# Patient Record
Sex: MALE | Race: WHITE | NOT HISPANIC OR LATINO | Employment: OTHER | URBAN - METROPOLITAN AREA
[De-identification: names, ages, dates, MRNs, and addresses within clinical notes are randomized per-mention and may not be internally consistent; named-entity substitution may affect disease eponyms.]

---

## 2017-01-21 ENCOUNTER — HOSPITAL ENCOUNTER (EMERGENCY)
Facility: HOSPITAL | Age: 57
Discharge: HOME | End: 2017-01-21
Attending: EMERGENCY MEDICINE
Payer: MEDICARE

## 2017-01-21 VITALS
SYSTOLIC BLOOD PRESSURE: 122 MMHG | BODY MASS INDEX: 30.06 KG/M2 | DIASTOLIC BLOOD PRESSURE: 80 MMHG | HEIGHT: 70 IN | WEIGHT: 210 LBS | TEMPERATURE: 98 F | HEART RATE: 84 BPM | RESPIRATION RATE: 20 BRPM | OXYGEN SATURATION: 100 %

## 2017-01-21 DIAGNOSIS — G89.29 CHRONIC BACK PAIN, UNSPECIFIED BACK LOCATION, UNSPECIFIED BACK PAIN LATERALITY: ICD-10-CM

## 2017-01-21 DIAGNOSIS — M25.562 CHRONIC PAIN OF LEFT KNEE: ICD-10-CM

## 2017-01-21 DIAGNOSIS — M54.9 CHRONIC BACK PAIN, UNSPECIFIED BACK LOCATION, UNSPECIFIED BACK PAIN LATERALITY: ICD-10-CM

## 2017-01-21 DIAGNOSIS — R14.0 ABDOMINAL DISTENSION: Primary | ICD-10-CM

## 2017-01-21 DIAGNOSIS — G89.29 CHRONIC PAIN OF LEFT KNEE: ICD-10-CM

## 2017-01-21 PROCEDURE — 99283 EMERGENCY DEPT VISIT LOW MDM: CPT

## 2017-01-21 NOTE — ED AVS SNAPSHOT
[facility] [address]  [place] [address]               Lucio Mendoza   2017  3:34 PM   ED    Description:  Male : 1960   Department:  [facility]           Your Care was Coordinated By:     Provider Role From To    Anna Peacock MD Attending Provider 17 1674 --      Reason for Visit     Abnormal Lab           Diagnoses this Visit        Comments    Abdominal distension    -  Primary     Chronic back pain, unspecified back location, unspecified back pain laterality         Chronic pain of left knee           ED Disposition     None           To Do List           Follow-up Information     Follow up with your doctor  In 2 days.    Why:  Your doctor can obtain your abdominal CT scan and ultrsound results from 2016 by requsting medical records.        [facility]     [facility] Nurse Care [facility] -  Assistance  Registered nurses in the [facility] provide clinical advisement, health education, appointment booking, and other advisory services.  Call for this free service at [phone].             Medications           STOP taking these medications     dicyclomine (BENTYL) 20 mg tablet Take 1 tablet (20 mg total) by mouth 2 (two) times daily.    tizanidine (ZANAFLEX) 4 MG tablet Take 1 tablet (4 mg total) by mouth every 6 (six) hours as needed.           Verify that the below list of medications is an accurate representation of the medications you are currently taking.  If none reported, the list may be blank. If incorrect, please contact your healthcare provider. Carry this list with you in case of emergency.           Current Medications     alprazolam (XANAX) 1 MG tablet Take 1 mg by mouth 2 (two) times daily.    duloxetine (CYMBALTA) 20 MG capsule Take 10 mg by mouth once daily.    zolpidem (AMBIEN) 10 mg Tab Take 5 mg by mouth nightly as needed.    triamcinolone acetonide 0.1% (KENALOG) 0.1 % cream APPLY  "TO AFFECTED AREA TWICE DAILY           Clinical Reference Information           Your Vitals Were     BP Pulse Temp Resp Height Weight    122/80 84 98.1 °F (36.7 °C) (Oral) 20 5' 10" (1.778 m) 95.3 kg (210 lb)    SpO2 BMI             100% 30.13 kg/m2         Allergies as of 1/21/2017     No Known Allergies      Immunizations Administered on Date of Encounter - 1/21/2017     None      ED Micro, Lab, POCT     None      ED Imaging Orders     None        Discharge Instructions       Follow up with your doctor and let him know that you had CT and ultrasound of your abdomen done in late December.  He can request your medical records if he wants them.      Your Scheduled Appointments     Jan 25, 2017  8:30 AM CST   CONSULT with MD [place] [facility] - Endocrinology ([place]) [address] [facility] [address]  [place] [address]   [phone]               [facility] complies with applicable Federal civil rights laws and does not discriminate on the basis of race, color, national origin, age, disability, or sex.        Language Assistance Services     ATTENTION: Language assistance services are available, free of charge. Please call [phone].      ATENCIÓN: Si habla español, tiene a pan disposición servicios gratuitos de asistencia lingüística. Llame al [phone].     JUNIOR Ý: N?u b?n nói Ti?ng Vi?t, có các d?ch v? h? tr? ngôn ng? mi?n phí dành cho b?n. G?i s? [phone].        "
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
## 2017-01-21 NOTE — DISCHARGE INSTRUCTIONS
Follow up with your doctor and let him know that you had CT and ultrasound of your abdomen done in late December.  He can request your medical records if he wants them.
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
## 2017-02-13 ENCOUNTER — OFFICE VISIT (OUTPATIENT)
Dept: FAMILY MEDICINE | Facility: CLINIC | Age: 57
End: 2017-02-13
Payer: MEDICARE

## 2017-02-13 ENCOUNTER — LAB VISIT (OUTPATIENT)
Dept: LAB | Facility: HOSPITAL | Age: 57
End: 2017-02-13
Attending: FAMILY MEDICINE
Payer: MEDICARE

## 2017-02-13 VITALS
TEMPERATURE: 98 F | WEIGHT: 207.69 LBS | SYSTOLIC BLOOD PRESSURE: 124 MMHG | DIASTOLIC BLOOD PRESSURE: 87 MMHG | BODY MASS INDEX: 29.08 KG/M2 | HEIGHT: 71 IN | HEART RATE: 91 BPM

## 2017-02-13 DIAGNOSIS — R93.2 ABNORMAL CT OF LIVER: ICD-10-CM

## 2017-02-13 DIAGNOSIS — B18.2 CHRONIC HEPATITIS C WITHOUT HEPATIC COMA: ICD-10-CM

## 2017-02-13 DIAGNOSIS — E55.9 VITAMIN D INSUFFICIENCY: ICD-10-CM

## 2017-02-13 DIAGNOSIS — E83.52 HYPERCALCEMIA: ICD-10-CM

## 2017-02-13 DIAGNOSIS — K76.9 LIVER LESION: Primary | ICD-10-CM

## 2017-02-13 DIAGNOSIS — K80.20 CALCULUS OF GALLBLADDER WITHOUT CHOLECYSTITIS WITHOUT OBSTRUCTION: ICD-10-CM

## 2017-02-13 DIAGNOSIS — E21.0 PRIMARY HYPERPARATHYROIDISM: ICD-10-CM

## 2017-02-13 LAB
AFP SERPL-MCNC: 11 NG/ML
AMMONIA PLAS-SCNC: 39 UMOL/L
APTT BLDCRRT: 32.6 SEC
INR PPP: 1
PROTHROMBIN TIME: 11.1 SEC

## 2017-02-13 PROCEDURE — 82140 ASSAY OF AMMONIA: CPT

## 2017-02-13 PROCEDURE — 85610 PROTHROMBIN TIME: CPT

## 2017-02-13 PROCEDURE — 99499 UNLISTED E&M SERVICE: CPT | Mod: S$GLB,,, | Performed by: FAMILY MEDICINE

## 2017-02-13 PROCEDURE — 99999 PR PBB SHADOW E&M-EST. PATIENT-LVL IV: CPT | Mod: PBBFAC,,, | Performed by: FAMILY MEDICINE

## 2017-02-13 PROCEDURE — 99214 OFFICE O/P EST MOD 30 MIN: CPT | Mod: S$GLB,,, | Performed by: FAMILY MEDICINE

## 2017-02-13 PROCEDURE — 82105 ALPHA-FETOPROTEIN SERUM: CPT

## 2017-02-13 PROCEDURE — 36415 COLL VENOUS BLD VENIPUNCTURE: CPT | Mod: PO

## 2017-02-13 PROCEDURE — 85730 THROMBOPLASTIN TIME PARTIAL: CPT

## 2017-02-13 NOTE — MR AVS SNAPSHOT
[facility]  [address] [address]  Phone: [phone]  Fax: [phone]                  Lucio Mendoza   2017 10:20 AM   Office Visit    Description:  Male : 1960   Provider:  Marvin Chaudhari MD   Department:  [facility]           Reason for Visit     Abdominal Pain           Diagnoses this Visit        Comments    Liver lesion    -  Primary     Primary hyperparathyroidism         Hypercalcemia         Vitamin D insufficiency         Calculus of gallbladder without cholecystitis without obstruction         Chronic hepatitis C without hepatic coma         Abnormal CT of liver                To Do List           Future Appointments        Provider Department Dept Phone    2017 2:00 PM Diamante Masterson MD [facility] Surgery [phone]    2017 1:00 PM JOSE Madrigal [facility] - Endo/Diab/Metab [phone]      Goals (5 Years of Data)     None      [facility]     [facility] On Call Nurse Care Line -  Assistance  Registered nurses in the [facility] Call Center provide clinical advisement, health education, appointment booking, and other advisory services.  Call for this free service at [phone].             Medications                Verify that the below list of medications is an accurate representation of the medications you are currently taking.  If none reported, the list may be blank. If incorrect, please contact your healthcare provider. Carry this list with you in case of emergency.           Current Medications     alprazolam (XANAX) 1 MG tablet Take 1 mg by mouth 2 (two) times daily.    duloxetine (CYMBALTA) 20 MG capsule Take 10 mg by mouth once daily.    triamcinolone acetonide 0.1% (KENALOG) 0.1 % cream APPLY TO AFFECTED AREA TWICE DAILY    zolpidem (AMBIEN) 10 mg Tab Take 5 mg by mouth nightly as needed.           Clinical Reference Information           Your Vitals Were     BP Pulse Temp  "Height Weight BMI    124/87 91 97.7 °F (36.5 °C) 5' 11" (1.803 m) 94.2 kg (207 lb 10.8 oz) 28.96 kg/m2      Blood Pressure          Most Recent Value    BP  124/87      Allergies as of 2/13/2017     No Known Allergies      Immunizations Administered on Date of Encounter - 2/13/2017     None      Orders Placed During Today's Visit      Normal Orders This Visit    Ambulatory referral to Endocrinology     Ambulatory referral to General Surgery     Ambulatory Referral to Hepatology     Future Labs/Procedures Expected by Expires    AFP TUMOR MARKER  2/13/2017 4/14/2018    Ammonia  2/13/2017 4/14/2018    APTT  2/13/2017 4/14/2018    Protime-INR  2/13/2017 4/14/2018      Language Assistance Services     ATTENTION: Language assistance services are available, free of charge. Please call [phone].      ATENCIÓN: Si habla carlos, tiene a pan disposición servicios gratuitos de asistencia lingüística. Llame al [phone].     CHÚ Ý: N?u b?n nói Ti?ng Vi?t, có các d?ch v? h? tr? ngôn ng? mi?n phí dành cho b?n. G?i s? [phone].         [facility] complies with applicable Federal civil rights laws and does not discriminate on the basis of race, color, national origin, age, disability, or sex.        "
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
## 2017-02-14 ENCOUNTER — DOCUMENTATION ONLY (OUTPATIENT)
Dept: TRANSPLANT | Facility: CLINIC | Age: 57
End: 2017-02-14

## 2017-02-14 NOTE — LETTER
February 14, 2017    Gerardo Mendoza  [address] [place] [address]      Dear Gerardo Mendoza:    Your doctor has referred you to the [facility]. You will be contacted by our office and an initial appointment will then be scheduled for you.    We look forward to seeing you soon. If you have any further questions, please contact us at [phone].       Sincerely,        [facility]   [address] [place] [id number] [phone]

## 2017-02-14 NOTE — LETTER
February 14, 2017    Marvin Chaudhari MD  [address] [address] [address] [facility] [address]      Dear Dr. Chaudhari    Patient: Lucio Mendoza   MR Number: [id number]   YOB: 1960     Thank you for the referral of Lucio Mendoza to the [facility]. An initial appointment will be scheduled for your patient with one of our Hepatologists.      Thank you again for your trust in our program.  If there is anything we can do for you or your staff, please feel free to contact us.        Sincerely,        [facility]  [address] [place] [id number]  [phone]

## 2017-02-14 NOTE — NURSING
Pt records reviewed.   Pt will be referred to Hepatology.    Initial referral received  from the [facility].   Referring Provider/diagnosis  Marvin Chaudhari.   Liver lesion  Abnormal CT of liver from Dec 2016     Referral letter sent to provider and patient.

## 2017-02-15 ENCOUNTER — TELEPHONE (OUTPATIENT)
Dept: FAMILY MEDICINE | Facility: CLINIC | Age: 57
End: 2017-02-15

## 2017-02-15 NOTE — TELEPHONE ENCOUNTER
----- Message from Annabel Oconnor sent at 2/15/2017 10:23 AM CST -----  returned call...[phone] or [phone]

## 2017-02-17 ENCOUNTER — TELEPHONE (OUTPATIENT)
Dept: HEPATOLOGY | Facility: CLINIC | Age: 57
End: 2017-02-17

## 2017-02-17 ENCOUNTER — OFFICE VISIT (OUTPATIENT)
Dept: SURGERY | Facility: CLINIC | Age: 57
End: 2017-02-17
Payer: MEDICARE

## 2017-02-17 VITALS
HEIGHT: 71 IN | SYSTOLIC BLOOD PRESSURE: 126 MMHG | DIASTOLIC BLOOD PRESSURE: 84 MMHG | TEMPERATURE: 99 F | WEIGHT: 216.81 LBS | BODY MASS INDEX: 30.35 KG/M2 | HEART RATE: 80 BPM

## 2017-02-17 DIAGNOSIS — B18.2 CHRONIC HEPATITIS C WITHOUT HEPATIC COMA: Primary | ICD-10-CM

## 2017-02-17 DIAGNOSIS — K80.20 CALCULUS OF GALLBLADDER WITHOUT CHOLECYSTITIS WITHOUT OBSTRUCTION: ICD-10-CM

## 2017-02-17 DIAGNOSIS — K76.9 LIVER LESION: ICD-10-CM

## 2017-02-17 DIAGNOSIS — E83.52 HYPERCALCEMIA: ICD-10-CM

## 2017-02-17 PROCEDURE — 99205 OFFICE O/P NEW HI 60 MIN: CPT | Mod: S$GLB,,, | Performed by: SURGERY

## 2017-02-17 PROCEDURE — 99499 UNLISTED E&M SERVICE: CPT | Mod: S$GLB,,, | Performed by: SURGERY

## 2017-02-17 PROCEDURE — 99999 PR PBB SHADOW E&M-EST. PATIENT-LVL III: CPT | Mod: PBBFAC,,, | Performed by: SURGERY
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
## 2017-02-17 NOTE — TELEPHONE ENCOUNTER
----- Message from Jennifer B. Scheuermann, PA sent at 2/17/2017  3:46 PM CST -----  Regarding: thx      ----- Message -----     From: Stalin Amezcua MD     Sent: 2/17/2017   3:40 PM       To: Lillian Gambino RN, #    Can you please arrange a new pt appointment?  Thanks  G  ----- Message -----     From: Diamante Masterson MD     Sent: 2/17/2017   2:43 PM       To: Stalin Amezcua MD    He is awaiting clinic appt with hepatology for liver lesion and hep c abnormality, can you help arrange?    He also has a workup for hyperpth ongoing, looks potentially related to low vit d    He also has a 1.6 cm gallstone and mild ruq tenderness/bloating, may or may not be related. i'll hold on any cholecystectomy until this hepatology eval.     derek hernandez

## 2017-02-17 NOTE — MR AVS SNAPSHOT
"    [facility]  [address] [place] [address]  4th Floor [place] [address]  Phone: [phone]                  Lucio Mendoza   2017 2:00 PM   Office Visit    Description:  Male : 1960   Provider:  Diamante Masterson MD   Department:  [facility]                To Do List           Future Appointments        Provider Department Dept Phone    2017 1:00 PM Celine Sandoval NP [address] - Endo/Diab/Metab [phone]      Goals (5 Years of Data)     None      [facility] On Call     [facility] On Call Nurse Care Line -  Assistance  Registered nurses in the [facility] On Call Center provide clinical advisement, health education, appointment booking, and other advisory services.  Call for this free service at [phone].             Medications                Verify that the below list of medications is an accurate representation of the medications you are currently taking.  If none reported, the list may be blank. If incorrect, please contact your healthcare provider. Carry this list with you in case of emergency.           Current Medications     alprazolam (XANAX) 1 MG tablet Take 1 mg by mouth 2 (two) times daily.    duloxetine (CYMBALTA) 20 MG capsule Take 10 mg by mouth once daily.    triamcinolone acetonide 0.1% (KENALOG) 0.1 % cream APPLY TO AFFECTED AREA TWICE DAILY    zolpidem (AMBIEN) 10 mg Tab Take 5 mg by mouth nightly as needed.           Clinical Reference Information           Your Vitals Were     BP Pulse Temp Height Weight BMI    126/84 (BP Location: Left arm, Patient Position: Sitting) 80 98.5 °F (36.9 °C) (Oral) 5' 11" (1.803 m) 98.4 kg (216 lb 13.2 oz) 30.24 kg/m2      Blood Pressure          Most Recent Value    BP  126/84      Allergies as of 2017     No Known Allergies      Immunizations Administered on Date of Encounter - 2017     None      Language Assistance Services     ATTENTION: Language assistance services are available, free " of charge. Please call [phone].      ATENCIÓN: Si habla español, tiene a pan disposición servicios gratuitos de asistencia lingüística. Llame al [phone].     CHÚ Ý: N?u b?n nói Ti?ng Vi?t, có các d?ch v? h? tr? ngôn ng? mi?n phí dành cho b?n. G?i s? [phone].         [facility] complies with applicable Federal civil rights laws and does not discriminate on the basis of race, color, national origin, age, disability, or sex.

## 2017-02-17 NOTE — LETTER
February 17, 2017      Marvin Chaudhari MD  [address] [facility] [address]           [address]  4th Floor [facility] [address]  Phone: [phone]          Patient: Lucio Mendoza   MR Number: [id number]   YOB: 1960   Date of Visit: 2/17/2017       Dear Dr. Marvin Chaudhari:    Thank you for referring Lucio Mendoza to me for evaluation. Attached you will find relevant portions of my assessment and plan of care.    If you have questions, please do not hesitate to call me. I look forward to following Lucio Mendoza along with you.    Sincerely,    Diamante Masterson MD    Enclosure  CC:  No Recipients    If you would like to receive this communication electronically, please contact [email or web address] or [phone] to request more information on [employer] Link access.    For providers and/or their staff who would like to refer a patient to [facility], please contact us through our one-stop-shop provider referral line, [facility], at [phone].    If you feel you have received this communication in error or would no longer like to receive these types of communications, please e-mail [email or web address]

## 2017-02-20 ENCOUNTER — OFFICE VISIT (OUTPATIENT)
Dept: ENDOCRINOLOGY | Facility: CLINIC | Age: 57
End: 2017-02-20
Payer: MEDICARE

## 2017-02-20 ENCOUNTER — TELEPHONE (OUTPATIENT)
Dept: FAMILY MEDICINE | Facility: CLINIC | Age: 57
End: 2017-02-20

## 2017-02-20 VITALS
WEIGHT: 205 LBS | HEIGHT: 71 IN | HEART RATE: 74 BPM | BODY MASS INDEX: 28.7 KG/M2 | SYSTOLIC BLOOD PRESSURE: 130 MMHG | DIASTOLIC BLOOD PRESSURE: 84 MMHG | RESPIRATION RATE: 16 BRPM

## 2017-02-20 DIAGNOSIS — E55.9 VITAMIN D INSUFFICIENCY: ICD-10-CM

## 2017-02-20 DIAGNOSIS — E21.0 PRIMARY HYPERPARATHYROIDISM: Primary | ICD-10-CM

## 2017-02-20 PROCEDURE — 99999 PR PBB SHADOW E&M-EST. PATIENT-LVL III: CPT | Mod: PBBFAC,,, | Performed by: INTERNAL MEDICINE

## 2017-02-20 PROCEDURE — 99204 OFFICE O/P NEW MOD 45 MIN: CPT | Mod: S$GLB,,, | Performed by: INTERNAL MEDICINE

## 2017-02-20 RX ORDER — ERGOCALCIFEROL 1.25 MG/1
CAPSULE ORAL
Qty: 10 CAPSULE | Refills: 0 | Status: SHIPPED | OUTPATIENT
Start: 2017-02-20 | End: 2017-03-29 | Stop reason: SDUPTHER

## 2017-02-20 NOTE — MR AVS SNAPSHOT
[facility] [address] - Endo/Diab/Metab  [address]  [facility] [address]  Phone: [phone]  Fax: [phone]                  Lucio Mendoza   2017 1:00 PM   Office Visit    Description:  Male : 1960   Provider:  Celine Sandoval NP   Department:  [facility] - Endo/Diab/Metab           Reason for Visit     Hyperparathyroidism           Diagnoses this Visit        Comments    Primary hyperparathyroidism    -  Primary     Vitamin D insufficiency                To Do List           Future Appointments        Provider Department Dept Phone    3/17/2017 10:30 AM NOM US ENDOCRINOLOGY [facility] -[phone]    3/17/2017 11:20 AM NOM, DEXA1 [facility] [phone]    3/17/2017 12:00 PM [facility] NM4 MG2 400LB LIMIT [facility] [phone]    3/17/2017 2:00 PM [facility] NM4 MG2 400LB LIMIT [facility] [phone]    2017 10:00 AM APPOINTMENT LABERIKA MOB [facility] [phone]      Goals (5 Years of Data)     None      Follow-Up and Disposition     Return in about 6 weeks (around 4/3/2017).       These Medications        Disp Refills Start End    ergocalciferol (ERGOCALCIFEROL) 50,000 unit Cap 10 capsule 0 2017     Take 1 capsule by mouth once daily x 5 days, then take 1 capsule by mouth once a week    Pharmacy: [facility] [id number] [address] [facility] [address] AT [facility] Ph #: [phone]         [facility] On Call     [facility] On Call Nurse Care Line -  Assistance  Registered nurses in the [facility] On Call Center provide clinical advisement, health education, appointment booking, and other advisory services.  Call for this free service at [phone].             Medications           START taking these NEW medications        Refills    ergocalciferol (ERGOCALCIFEROL) 50,000 unit Cap 0    Sig: Take 1 capsule by mouth once daily x 5 days, then  "take 1 capsule by mouth once a week    Class: Normal           Verify that the below list of medications is an accurate representation of the medications you are currently taking.  If none reported, the list may be blank. If incorrect, please contact your healthcare provider. Carry this list with you in case of emergency.           Current Medications     alprazolam (XANAX) 1 MG tablet Take 1 mg by mouth 2 (two) times daily.    duloxetine (CYMBALTA) 20 MG capsule Take 10 mg by mouth once daily.    triamcinolone acetonide 0.1% (KENALOG) 0.1 % cream APPLY TO AFFECTED AREA TWICE DAILY    zolpidem (AMBIEN) 10 mg Tab Take 5 mg by mouth nightly as needed.    ergocalciferol (ERGOCALCIFEROL) 50,000 unit Cap Take 1 capsule by mouth once daily x 5 days, then take 1 capsule by mouth once a week           Clinical Reference Information           Your Vitals Were     BP Pulse Resp Height Weight BMI    130/84 (BP Location: Right arm, Patient Position: Sitting, BP Method: Manual) 74 16 5' 11" (1.803 m) 93 kg (205 lb 0.4 oz) 28.6 kg/m2      Blood Pressure          Most Recent Value    BP  130/84      Allergies as of 2/20/2017     No Known Allergies      Immunizations Administered on Date of Encounter - 2/20/2017     None      Orders Placed During Today's Visit     Future Labs/Procedures Expected by Expires    DXA Bone Density Spine And Hip_Axial Skeleton  2/20/2017 2/20/2018    NM Parathyroid Scan with SPECT Routine  2/20/2017 2/20/2018    PTH, intact  2/20/2017 4/21/2018    Renal function panel  2/20/2017 4/21/2018    US Soft Tissue Head Neck Thyroid  2/20/2017 2/20/2018    Vitamin D  2/20/2017 4/21/2018      Language Assistance Services     ATTENTION: Language assistance services are available, free of charge. Please call [phone].      ATENCIÓN: Si michealla carlos, tiene a pan disposición servicios gratuitos de asistencia lingüística. Llame al [phone].     CHÚ Ý: N?u b?n nói Ti?ng Vi?t, có các d?ch v? h? tr? ngôn ng? " mi?n phí dành cho b?n. G?i s? [phone].         [facility] complies with applicable Federal civil rights laws and does not discriminate on the basis of race, color, national origin, age, disability, or sex.

## 2017-02-20 NOTE — PROGRESS NOTES
Subjective:       Patient ID: Juan Smallwood is a 56 y.o. male.    Chief Complaint: Hyperparathyroidism    HPI   Mr. Smallwood is a very pleasant 56 year old male patient referred to the endocrine department for evaluation of elevated intact PTH level     Review of chart shows past history of elevated serum calcium since 2014     Repeat labs from 12/2016:  Serum calcium = 10.6   Albumin = 3.8   Corrected score = 10.76  iPTH = 94.0   Vitamin D = 19     Patient denies kidney stones   Denies chronic kidney disease   Denies personal hx of falls or fractures   Denies constipation   Denies muscle weakness   Denies nausea or vomiting   Denies depression or confusion     24 hour urine for calcium:   Results for JUAN SMALLWOOD (MRN [id number]) as of 2/20/2017 13:57   Ref. Range 12/12/2016 09:12   Calcium, Urine Latest Ref Range: 0.0 - 15.0 mg/dL 12.6   Calcium, 24H Urine Latest Ref Range: 4 - 12 mg/Hr 10   CA Urine (mg/Spec) Latest Units: mg/Spec 234       Patient also has Hep C , waiting for appointment with Hepatology for further evaluation     Review of Systems   Constitutional: Positive for fatigue. Negative for unexpected weight change.   HENT: Negative for sore throat, trouble swallowing and voice change.    Eyes: Negative for visual disturbance.   Respiratory: Negative for cough and shortness of breath.    Cardiovascular: Negative for chest pain, palpitations and leg swelling.   Gastrointestinal: Negative for abdominal pain, constipation, diarrhea, nausea and vomiting.   Endocrine: Negative for cold intolerance, heat intolerance, polydipsia, polyphagia and polyuria.   Genitourinary: Negative for dysuria.   Musculoskeletal: Positive for back pain. Negative for myalgias.   Skin: Negative for rash.   Allergic/Immunologic: Negative for immunocompromised state.   Neurological: Negative for tremors and headaches.   Hematological: Does not bruise/bleed easily.   Psychiatric/Behavioral: The patient is not nervous/anxious.         Objective:      Physical Exam   Constitutional: He is oriented to person, place, and time. He appears well-developed. No distress.   HENT:   Right Ear: External ear normal.   Left Ear: External ear normal.   Nose: Nose normal.   Hearing normal    Neck: No tracheal deviation present. No thyromegaly present.   Cardiovascular: Normal rate.    No murmur heard.  Pulmonary/Chest: Effort normal and breath sounds normal.   Abdominal: Soft. He exhibits no mass.   No hernia noted   Musculoskeletal: He exhibits no edema.   Neurological: He is alert and oriented to person, place, and time. No cranial nerve deficit or sensory deficit. Coordination and gait normal.        Skin: Skin is warm. No rash noted.   No nodules   Psychiatric: He has a normal mood and affect. Judgment normal.   Nursing note and vitals reviewed.      Assessment:       1. Primary hyperparathyroidism    2. Vitamin D insufficiency        Plan:       1. Primary hyperparathyroidism   - in the setting of persistent hypercalcemia and vitamin D insufficiency   - correct vitamin D deficiency   - thyroid ultrasound   - NM parathyroid scan with SPECT   - DEXA scan with distal radius   - indications for surgery : osteoporosis, kidney stones, CKD, age <50, hypercalcuria, serum calcium >1.0 mg/dL or more above the upper limit of normal     2. Vitamin d deficiency   - start Ergo as prescribed     Repeat labs in 4 weeks:  Vitamin D   iPTH   RENAL function panel     RTC in 6 weeks to discuss results     Case discussed in consultation with Dr. Balderas   Recommendations were discussed with the patient in detail   Handouts provided   The patient verbalized understanding and agrees with the treatment plan as outlined above

## 2017-02-20 NOTE — LETTER
February 20, 2017      Marvin Chaudhari MD  [address] [facility] [address]           [facility] - Endo/Diab/Metab  [address]  [facility] [address]  Phone: [phone]  Fax: [phone]          Patient: Lucio Mendoza   MR Number: [id number]   YOB: 1960   Date of Visit: 2/20/2017       Dear Dr. Marvin Chaudhari:    Thank you for referring Lucio Mendoza to me for evaluation. Attached you will find relevant portions of my assessment and plan of care.    If you have questions, please do not hesitate to call me. I look forward to following Lucio Mendoza along with you.    Sincerely,    Celine Sandoval, JOSE    Enclosure  CC:  No Recipients    If you would like to receive this communication electronically, please contact [email or web address] or [phone] to request more information on [employer] Link access.    For providers and/or their staff who would like to refer a patient to [facility], please contact us through our one-stop-shop provider referral line, [facility] , at [phone].    If you feel you have received this communication in error or would no longer like to receive these types of communications, please e-mail [email or web address]

## 2017-02-21 ENCOUNTER — TELEPHONE (OUTPATIENT)
Dept: HEPATOLOGY | Facility: CLINIC | Age: 57
End: 2017-02-21

## 2017-02-21 DIAGNOSIS — B18.2 CHRONIC HEPATITIS C WITHOUT HEPATIC COMA: Primary | ICD-10-CM

## 2017-02-21 NOTE — TELEPHONE ENCOUNTER
2nd attempt to schedule consult unsuccessful. Left message on both numbers listed, requesting pt call back. Given direct number.

## 2017-02-23 ENCOUNTER — TELEPHONE (OUTPATIENT)
Dept: SURGERY | Facility: CLINIC | Age: 57
End: 2017-02-23

## 2017-02-23 NOTE — TELEPHONE ENCOUNTER
----- Message from Kailyn Greer sent at 2/23/2017  3:07 PM CST -----  Patient's friend, Caroline, called this morning.   No. [phone]    Patient is having gall bladder pain.  Please call.      02/23/17 1510  Contacted pt. Pt's concerned about pain. Pain level: 5/10, but pain went up to 10/10 on last night for about 2 hours. Pt's friend informed that Dr. Masterson usually does not prescribe pain medication to pt's she has not operated on, but that i would still relay the message. Pt states he woke up sweating and in pain, but denies fever at this time. Temp: 97.9. Pt had 2 bms today. First one was formed, and the second more like diarrhea. Pt states he does not think he needs to be seen, but if he could get something for pain it would be great either in pill form or patch. Pt informed that for pain he should see his primary care physician, and if pain increases to go to the ER per Dr. Masterson. Pt verbalized understanding.

## 2017-02-27 ENCOUNTER — TELEPHONE (OUTPATIENT)
Dept: FAMILY MEDICINE | Facility: CLINIC | Age: 57
End: 2017-02-27

## 2017-02-27 NOTE — TELEPHONE ENCOUNTER
----- Message from Tamara Cook sent at 2/27/2017 10:28 AM CST -----  Contact: pt  Pt calling for lab results.....[phone] till after lunch then after 1 can be reached at  [phone]

## 2017-03-10 ENCOUNTER — LAB VISIT (OUTPATIENT)
Dept: LAB | Facility: HOSPITAL | Age: 57
End: 2017-03-10
Attending: INTERNAL MEDICINE
Payer: MEDICARE

## 2017-03-10 DIAGNOSIS — B18.2 CHRONIC HEPATITIS C WITHOUT HEPATIC COMA: ICD-10-CM

## 2017-03-10 LAB
ALBUMIN SERPL BCP-MCNC: 4 G/DL
ALP SERPL-CCNC: 86 U/L
ALT SERPL W/O P-5'-P-CCNC: 120 U/L
ANION GAP SERPL CALC-SCNC: 11 MMOL/L
AST SERPL-CCNC: 71 U/L
BASOPHILS # BLD AUTO: 0.01 K/UL
BASOPHILS NFR BLD: 0.1 %
BILIRUB SERPL-MCNC: 0.9 MG/DL
BUN SERPL-MCNC: 24 MG/DL
CALCIUM SERPL-MCNC: 11.6 MG/DL
CHLORIDE SERPL-SCNC: 103 MMOL/L
CO2 SERPL-SCNC: 21 MMOL/L
CREAT SERPL-MCNC: 1.1 MG/DL
DIFFERENTIAL METHOD: ABNORMAL
EOSINOPHIL # BLD AUTO: 0 K/UL
EOSINOPHIL NFR BLD: 0 %
ERYTHROCYTE [DISTWIDTH] IN BLOOD BY AUTOMATED COUNT: 13 %
EST. GFR  (AFRICAN AMERICAN): >60 ML/MIN/1.73 M^2
EST. GFR  (NON AFRICAN AMERICAN): >60 ML/MIN/1.73 M^2
GLUCOSE SERPL-MCNC: 154 MG/DL
HCT VFR BLD AUTO: 45.7 %
HGB BLD-MCNC: 15.8 G/DL
INR PPP: 1.1
LYMPHOCYTES # BLD AUTO: 2 K/UL
LYMPHOCYTES NFR BLD: 15 %
MCH RBC QN AUTO: 30.1 PG
MCHC RBC AUTO-ENTMCNC: 34.6 %
MCV RBC AUTO: 87 FL
MONOCYTES # BLD AUTO: 0.7 K/UL
MONOCYTES NFR BLD: 5 %
NEUTROPHILS # BLD AUTO: 10.6 K/UL
NEUTROPHILS NFR BLD: 79.7 %
PLATELET # BLD AUTO: 210 K/UL
PMV BLD AUTO: 11 FL
POTASSIUM SERPL-SCNC: 4.6 MMOL/L
PROT SERPL-MCNC: 8.7 G/DL
PROTHROMBIN TIME: 11.3 SEC
RBC # BLD AUTO: 5.25 M/UL
SODIUM SERPL-SCNC: 135 MMOL/L
WBC # BLD AUTO: 13.29 K/UL

## 2017-03-10 PROCEDURE — 85025 COMPLETE CBC W/AUTO DIFF WBC: CPT

## 2017-03-10 PROCEDURE — 36415 COLL VENOUS BLD VENIPUNCTURE: CPT | Mod: PO

## 2017-03-10 PROCEDURE — 85610 PROTHROMBIN TIME: CPT

## 2017-03-10 PROCEDURE — 87522 HEPATITIS C REVRS TRNSCRPJ: CPT

## 2017-03-10 PROCEDURE — 87902 NFCT AGT GNTYP ALYS HEP C: CPT

## 2017-03-10 PROCEDURE — 80053 COMPREHEN METABOLIC PANEL: CPT

## 2017-03-15 LAB
HCV GENTYP SERPL NAA+PROBE: ABNORMAL
HCV QUALITATIVE RESULT: DETECTED
HCV QUANTITATIVE LOG: 6.67 LOG (10) IU/ML
HCV RNA SPEC NAA+PROBE-ACNC: ABNORMAL IU/ML

## 2017-03-15 RX ORDER — TRIAMCINOLONE ACETONIDE 1 MG/G
CREAM TOPICAL
Qty: 80 G | Refills: 1 | Status: SHIPPED | OUTPATIENT
Start: 2017-03-15 | End: 2017-12-08 | Stop reason: SDUPTHER

## 2017-03-17 ENCOUNTER — PROCEDURE VISIT (OUTPATIENT)
Dept: HEPATOLOGY | Facility: CLINIC | Age: 57
End: 2017-03-17
Attending: INTERNAL MEDICINE
Payer: MEDICARE

## 2017-03-17 ENCOUNTER — HOSPITAL ENCOUNTER (OUTPATIENT)
Dept: ENDOCRINOLOGY | Facility: CLINIC | Age: 57
Discharge: HOME | End: 2017-03-17
Attending: INTERNAL MEDICINE
Payer: MEDICARE

## 2017-03-17 ENCOUNTER — HOSPITAL ENCOUNTER (OUTPATIENT)
Dept: RADIOLOGY | Facility: CLINIC | Age: 57
Discharge: HOME | End: 2017-03-17
Attending: INTERNAL MEDICINE
Payer: MEDICARE

## 2017-03-17 ENCOUNTER — HOSPITAL ENCOUNTER (OUTPATIENT)
Dept: RADIOLOGY | Facility: HOSPITAL | Age: 57
Discharge: HOME | End: 2017-03-17
Attending: INTERNAL MEDICINE
Payer: MEDICARE

## 2017-03-17 ENCOUNTER — OFFICE VISIT (OUTPATIENT)
Dept: HEPATOLOGY | Facility: CLINIC | Age: 57
End: 2017-03-17
Payer: MEDICARE

## 2017-03-17 VITALS
OXYGEN SATURATION: 96 % | DIASTOLIC BLOOD PRESSURE: 70 MMHG | HEART RATE: 98 BPM | TEMPERATURE: 98 F | WEIGHT: 210.31 LBS | SYSTOLIC BLOOD PRESSURE: 133 MMHG | BODY MASS INDEX: 29.44 KG/M2 | RESPIRATION RATE: 16 BRPM | HEIGHT: 71 IN

## 2017-03-17 DIAGNOSIS — E21.0 PRIMARY HYPERPARATHYROIDISM: ICD-10-CM

## 2017-03-17 DIAGNOSIS — B18.2 CHRONIC HEPATITIS C WITHOUT HEPATIC COMA: ICD-10-CM

## 2017-03-17 DIAGNOSIS — Z11.4 ENCOUNTER FOR SCREENING FOR HIV: ICD-10-CM

## 2017-03-17 DIAGNOSIS — K76.9 LIVER LESION: ICD-10-CM

## 2017-03-17 DIAGNOSIS — K74.60 CIRRHOSIS OF LIVER WITHOUT ASCITES, UNSPECIFIED HEPATIC CIRRHOSIS TYPE: Primary | ICD-10-CM

## 2017-03-17 PROCEDURE — 77080 DXA BONE DENSITY AXIAL: CPT | Mod: 26,,, | Performed by: INTERNAL MEDICINE

## 2017-03-17 PROCEDURE — 76536 US EXAM OF HEAD AND NECK: CPT | Mod: S$GLB,,, | Performed by: INTERNAL MEDICINE

## 2017-03-17 PROCEDURE — 99205 OFFICE O/P NEW HI 60 MIN: CPT | Mod: S$GLB,,, | Performed by: PHYSICIAN ASSISTANT

## 2017-03-17 PROCEDURE — 99499 UNLISTED E&M SERVICE: CPT | Mod: S$GLB,,, | Performed by: PHYSICIAN ASSISTANT

## 2017-03-17 PROCEDURE — 1160F RVW MEDS BY RX/DR IN RCRD: CPT | Mod: S$GLB,,, | Performed by: PHYSICIAN ASSISTANT

## 2017-03-17 PROCEDURE — 99999 PR PBB SHADOW E&M-EST. PATIENT-LVL IV: CPT | Mod: PBBFAC,,, | Performed by: PHYSICIAN ASSISTANT

## 2017-03-17 PROCEDURE — 91200 LIVER ELASTOGRAPHY: CPT | Mod: S$GLB,,, | Performed by: PHYSICIAN ASSISTANT

## 2017-03-17 PROCEDURE — 78071 PARATHYRD PLANAR W/WO SUBTRJ: CPT | Mod: 26,,, | Performed by: NUCLEAR MEDICINE

## 2017-03-17 RX ORDER — TRAMADOL HYDROCHLORIDE 50 MG/1
50 TABLET ORAL EVERY 4 HOURS PRN
Status: ON HOLD | COMMUNITY
Start: 2017-03-10 | End: 2017-06-10 | Stop reason: HOSPADM

## 2017-03-17 RX ORDER — DICLOFENAC SODIUM 75 MG/1
75 TABLET, DELAYED RELEASE ORAL DAILY
Status: ON HOLD | COMMUNITY
Start: 2017-03-10 | End: 2017-06-10 | Stop reason: HOSPADM

## 2017-03-17 RX ORDER — TRAZODONE HYDROCHLORIDE 50 MG/1
TABLET ORAL
COMMUNITY
Start: 2017-03-10 | End: 2017-03-17

## 2017-03-17 RX ORDER — OXYCODONE AND ACETAMINOPHEN 10; 325 MG/1; MG/1
TABLET ORAL
Refills: 0 | Status: ON HOLD | COMMUNITY
Start: 2017-03-08 | End: 2017-06-10 | Stop reason: HOSPADM

## 2017-03-17 RX ORDER — ASPIRIN 81 MG/1
81 TABLET ORAL DAILY
Status: ON HOLD | COMMUNITY
End: 2017-06-10 | Stop reason: HOSPADM

## 2017-03-17 RX ORDER — METHOCARBAMOL 750 MG/1
750 TABLET, FILM COATED ORAL DAILY
COMMUNITY
Start: 2017-03-10 | End: 2018-05-16

## 2017-03-17 NOTE — LETTER
March 17, 2017      Marvin Chaudhari MD  [address] [facility] [address]           [facility] - Hepatology  [address]  [place] [address]  Phone: [phone]  Fax: [phone]          Patient: Lucio Mendoza   MR Number: [id number]   YOB: 1960   Date of Visit: 3/17/2017       Dear Dr. Marvin Chaudhari:    Thank you for referring Lucio Mendoza to me for evaluation. Attached you will find relevant portions of my assessment and plan of care.    If you have questions, please do not hesitate to call me. I look forward to following Lucio Mendoza along with you.    Sincerely,    Jennifer B. Scheuermann, PA    Enclosure  CC:  No Recipients    If you would like to receive this communication electronically, please contact [email or web address] or [phone] to request more information on [employer] Link access.    For providers and/or their staff who would like to refer a patient to [facility], please contact us through our one-stop-shop provider referral line, [facility] , at [phone].    If you feel you have received this communication in error or would no longer like to receive these types of communications, please e-mail [email or web address]
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
## 2017-03-17 NOTE — PROCEDURES
Procedures   Name: Lucio Mendoza  Date of Procedure : 2017   :: Jennifer B Scheuermann, PA  Diagnosis: HCV  Probe: XL    Fibroscan readin.4 KPa    IQR/med:11 %    Fibrosis:F4

## 2017-03-17 NOTE — MR AVS SNAPSHOT
[facility]  [address]  [facility] [address]  Phone: [phone]  Fax: [phone]                  Lucio Mendoza   3/17/2017 1:00 PM   Office Visit    Description:  Male : 1960   Provider:  Jennifer B. Scheuermann, PA   Department:  [facility]           Reason for Visit     Hepatitis C           Diagnoses this Visit        Comments    Cirrhosis of liver without ascites, unspecified hepatic cirrhosis type    -  Primary     Chronic hepatitis C without hepatic coma         Encounter for screening for HIV                To Do List           Future Appointments        Provider Department Dept Phone    3/17/2017 2:30 PM LAB, APPOINTMENT [place] [facility] [phone]    2017 10:00 AM APPOINTMENT LAB, [place] [facility] [phone]    2017 9:30 AM [facility] End[facility]/Diab/Metab [phone]    2017 11:00 AM [facility] US1 [facility] [phone]      Goals (5 Years of Data)     None      [facility]     [facility] Care Line -  Assistance  Registered nurses in the [facility] provide clinical advisement, health education, appointment booking, and other advisory services.  Call for this free service at [phone].             Medications           STOP taking these medications     trazodone (DESYREL) 50 MG tablet            Verify that the below list of medications is an accurate representation of the medications you are currently taking.  If none reported, the list may be blank. If incorrect, please contact your healthcare provider. Carry this list with you in case of emergency.           Current Medications     alprazolam (XANAX) 1 MG tablet Take 1 mg by mouth 2 (two) times daily.    aspirin (ECOTRIN) 81 MG EC tablet Take 81 mg by mouth once daily.    diclofenac (VOLTAREN) 75 MG EC tablet Take 75 mg by mouth once daily.     duloxetine  "(CYMBALTA) 20 MG capsule Take 10 mg by mouth once daily.    ergocalciferol (ERGOCALCIFEROL) 50,000 unit Cap Take 1 capsule by mouth once daily x 5 days, then take 1 capsule by mouth once a week    methocarbamol (ROBAXIN) 750 MG Tab Take 750 mg by mouth once daily.     oxycodone-acetaminophen (PERCOCET)  mg per tablet TAKE ONE TABLET BY MOUTH ONE TO TWO TIMES A DAY AS NEEDED    tramadol (ULTRAM) 50 mg tablet Take 50 mg by mouth every 4 (four) hours as needed.     triamcinolone acetonide 0.1% (KENALOG) 0.1 % cream APPLY TO AFFECTED AREA TWICE DAILY    zolpidem (AMBIEN) 10 mg Tab Take 5 mg by mouth nightly as needed.           Clinical Reference Information           Your Vitals Were     BP Pulse Temp Resp Height Weight    133/70 (BP Location: Right arm, Patient Position: Sitting, BP Method: Automatic) 98 98.3 °F (36.8 °C) (Oral) 16 5' 11" (1.803 m) 95.4 kg (210 lb 5.1 oz)    SpO2 BMI             96% 29.33 kg/m2         Blood Pressure          Most Recent Value    BP  133/70      Allergies as of 3/17/2017     No Known Allergies      Immunizations Administered on Date of Encounter - 3/17/2017     None      Orders Placed During Today's Visit     Future Labs/Procedures Expected by Expires    Hepatitis A antibody, IgG  As directed 3/17/2018    Hepatitis B core antibody, total  As directed 3/17/2018    Hepatitis B surface antibody  As directed 3/17/2018    Hepatitis B surface antigen  As directed 3/17/2018    HIV-1 and HIV-2 antibodies  As directed 3/17/2018    US Abdomen Complete  As directed 3/17/2018      Language Assistance Services     ATTENTION: Language assistance services are available, free of charge. Please call [phone].      ATENCIÓN: Si habla español, tiene a pan disposición servicios gratuitos de asistencia lingüística. Llame al [phone].     JUNIOR Ý: N?u b?n nói Ti?ng Vi?t, có các d?ch v? h? tr? ngôn ng? mi?n phí dành cho b?n. G?i s? [phone].         [facility] complies with " applicable Federal civil rights laws and does not discriminate on the basis of race, color, national origin, age, disability, or sex.

## 2017-03-17 NOTE — Clinical Note
When pt was here I told him our next step would be an U/S. Dr Amezcua actually recommends he have a tpCT instead. pls schedule CT and cancel the U/S. Thanks

## 2017-03-21 ENCOUNTER — TELEPHONE (OUTPATIENT)
Dept: HEPATOLOGY | Facility: CLINIC | Age: 57
End: 2017-03-21

## 2017-03-21 NOTE — TELEPHONE ENCOUNTER
----- Message from Jennifer B. Scheuermann, PA sent at 3/17/2017  3:29 PM CDT -----  When pt was here I told him our next step would be an U/S. Dr Amezcua actually recommends he have a tpCT instead. pls schedule CT and cancel the U/S. Thanks

## 2017-03-21 NOTE — TELEPHONE ENCOUNTER
Unable to reach pt. Left message informing pt that u/s appt has been changed to CT, remains on 4/24.  New reminder mailed along with message from provider.

## 2017-03-29 DIAGNOSIS — E55.9 VITAMIN D INSUFFICIENCY: ICD-10-CM

## 2017-03-29 RX ORDER — ERGOCALCIFEROL 1.25 MG/1
50000 CAPSULE ORAL
Qty: 6 CAPSULE | Refills: 0 | Status: SHIPPED | OUTPATIENT
Start: 2017-03-29 | End: 2017-04-06 | Stop reason: SDUPTHER

## 2017-04-05 DIAGNOSIS — E55.9 VITAMIN D INSUFFICIENCY: ICD-10-CM

## 2017-04-05 NOTE — TELEPHONE ENCOUNTER
Patient notified via phone to discuss test results. He did not answer. I recorded a voicemail message informing him to return my call at his earliest convenience.

## 2017-04-06 RX ORDER — ERGOCALCIFEROL 1.25 MG/1
50000 CAPSULE ORAL
Qty: 6 CAPSULE | Refills: 0 | Status: SHIPPED | OUTPATIENT
Start: 2017-04-06 | End: 2017-04-24 | Stop reason: SDUPTHER

## 2017-04-17 ENCOUNTER — LAB VISIT (OUTPATIENT)
Dept: LAB | Facility: HOSPITAL | Age: 57
End: 2017-04-17
Attending: INTERNAL MEDICINE
Payer: MEDICARE

## 2017-04-17 DIAGNOSIS — E21.0 PRIMARY HYPERPARATHYROIDISM: ICD-10-CM

## 2017-04-17 DIAGNOSIS — E55.9 VITAMIN D INSUFFICIENCY: ICD-10-CM

## 2017-04-17 LAB
ALBUMIN SERPL BCP-MCNC: 3.5 G/DL
ANION GAP SERPL CALC-SCNC: 7 MMOL/L
BUN SERPL-MCNC: 15 MG/DL
CALCIUM SERPL-MCNC: 10.7 MG/DL
CHLORIDE SERPL-SCNC: 104 MMOL/L
CO2 SERPL-SCNC: 26 MMOL/L
CREAT SERPL-MCNC: 1 MG/DL
EST. GFR  (AFRICAN AMERICAN): >60 ML/MIN/1.73 M^2
EST. GFR  (NON AFRICAN AMERICAN): >60 ML/MIN/1.73 M^2
GLUCOSE SERPL-MCNC: 104 MG/DL
PHOSPHATE SERPL-MCNC: 2 MG/DL
POTASSIUM SERPL-SCNC: 4.5 MMOL/L
PTH-INTACT SERPL-MCNC: 127 PG/ML
SODIUM SERPL-SCNC: 137 MMOL/L

## 2017-04-17 PROCEDURE — 36415 COLL VENOUS BLD VENIPUNCTURE: CPT

## 2017-04-17 PROCEDURE — 82306 VITAMIN D 25 HYDROXY: CPT

## 2017-04-17 PROCEDURE — 83970 ASSAY OF PARATHORMONE: CPT

## 2017-04-17 PROCEDURE — 80069 RENAL FUNCTION PANEL: CPT

## 2017-04-18 LAB — 25(OH)D3+25(OH)D2 SERPL-MCNC: 26 NG/ML

## 2017-04-24 ENCOUNTER — OFFICE VISIT (OUTPATIENT)
Dept: ENDOCRINOLOGY | Facility: CLINIC | Age: 57
End: 2017-04-24
Payer: MEDICARE

## 2017-04-24 ENCOUNTER — TELEPHONE (OUTPATIENT)
Dept: SURGERY | Facility: CLINIC | Age: 57
End: 2017-04-24

## 2017-04-24 ENCOUNTER — HOSPITAL ENCOUNTER (OUTPATIENT)
Dept: RADIOLOGY | Facility: HOSPITAL | Age: 57
Discharge: HOME | End: 2017-04-24
Attending: INTERNAL MEDICINE
Payer: MEDICARE

## 2017-04-24 VITALS
WEIGHT: 211.63 LBS | HEART RATE: 76 BPM | DIASTOLIC BLOOD PRESSURE: 88 MMHG | HEIGHT: 71 IN | SYSTOLIC BLOOD PRESSURE: 132 MMHG | RESPIRATION RATE: 16 BRPM | BODY MASS INDEX: 29.63 KG/M2

## 2017-04-24 DIAGNOSIS — K74.60 CIRRHOSIS OF LIVER WITHOUT ASCITES, UNSPECIFIED HEPATIC CIRRHOSIS TYPE: ICD-10-CM

## 2017-04-24 DIAGNOSIS — K76.9 LIVER LESION: ICD-10-CM

## 2017-04-24 DIAGNOSIS — B18.2 CHRONIC HEPATITIS C WITHOUT HEPATIC COMA: ICD-10-CM

## 2017-04-24 DIAGNOSIS — E21.0 PRIMARY HYPERPARATHYROIDISM: Primary | ICD-10-CM

## 2017-04-24 DIAGNOSIS — E55.9 VITAMIN D INSUFFICIENCY: ICD-10-CM

## 2017-04-24 PROCEDURE — 99214 OFFICE O/P EST MOD 30 MIN: CPT | Mod: S$GLB,,, | Performed by: INTERNAL MEDICINE

## 2017-04-24 PROCEDURE — 1160F RVW MEDS BY RX/DR IN RCRD: CPT | Mod: S$GLB,,, | Performed by: INTERNAL MEDICINE

## 2017-04-24 PROCEDURE — 74170 CT ABD WO CNTRST FLWD CNTRST: CPT | Mod: 26,,, | Performed by: RADIOLOGY

## 2017-04-24 PROCEDURE — 25500020 PHARM REV CODE 255: Performed by: INTERNAL MEDICINE

## 2017-04-24 PROCEDURE — 74170 CT ABD WO CNTRST FLWD CNTRST: CPT | Mod: TC

## 2017-04-24 PROCEDURE — 99999 PR PBB SHADOW E&M-EST. PATIENT-LVL III: CPT | Mod: PBBFAC,,, | Performed by: INTERNAL MEDICINE

## 2017-04-24 RX ORDER — ERGOCALCIFEROL 1.25 MG/1
50000 CAPSULE ORAL
Qty: 12 CAPSULE | Refills: 3 | Status: SHIPPED | OUTPATIENT
Start: 2017-04-24 | End: 2018-05-10 | Stop reason: SDUPTHER

## 2017-04-24 RX ORDER — ERGOCALCIFEROL 1.25 MG/1
50000 CAPSULE ORAL
Qty: 10 CAPSULE | Refills: 3 | Status: SHIPPED | OUTPATIENT
Start: 2017-04-24 | End: 2017-04-24

## 2017-04-24 RX ADMIN — IOHEXOL 15 ML: 350 INJECTION, SOLUTION INTRAVENOUS at 10:04

## 2017-04-24 RX ADMIN — IOHEXOL 100 ML: 350 INJECTION, SOLUTION INTRAVENOUS at 01:04

## 2017-04-24 NOTE — TELEPHONE ENCOUNTER
----- Message from Bia Moncada sent at 4/24/2017  3:38 PM CDT -----  Contact: [phone]  Pt would like a nurse call back in regards to surgery. Please advise.    04/24/17 1619  Spoke to pt's wife who called in stating that pt has been going to [facility], and it's been determined that pt has a growth on thyroid. Pt states that Dr. Penn wanted the pt to find out how many primary hyperparathyroidism removals had Dr. Masterson performed due to previous discussion of gallbladder, and thyroid surgery.  Pt's wife states he was informed by Dr. Masterson that she could do the thyroid surgery. Pt's wife informed that Dr. Masterson is currently out on maternity leave at this time, and would not be back until Mid May. Pt's wife verbalized understanding, and made an appt for 05/19/17 at 1:20pm. Date and time of the appt confirmed. Patient's wife verbalized understanding.

## 2017-04-24 NOTE — PROGRESS NOTES
Subjective:       Patient ID: Juan Smallwood is a 57 y.o. male.    Chief Complaint: Hyperparathyroidism    HPI   Mr. Smallwood returns to clinic today to discuss test results   He has Hyperparathyroidism with elevated serum calcium levels     He was noted to have elevated serum calcium since 2014     Parathyroid work up included:   NM parathyroid scan, which did not show scintigraph evidence of parathyroid adenoma in the neck or mediastinum   Thyroid ultrasound showed 2 areas adjacent to right and left thyroid lobes which could possibly represent a parathyroid adenoma   BMD showed mild Osteopenia of the femoral neck     Repeat labs from 04/2017:   Serum calcium = 10.7  Albumin = 3.5  Vitamin D = 26   iPTH = 127    Labs from 12/2016:  Serum calcium = 10.6   Albumin = 3.8   Corrected score = 10.76  iPTH = 94.0   Vitamin D = 19     He denies kidney stones   Denies chronic kidney disease   Denies personal hx of falls or fractures   Denies constipation   Denies muscle weakness   Denies nausea or vomiting   Denies depression or confusion     24 hour urine for calcium:   Results for JUAN SMALLWOOD (MRN [id number]) as of 2/20/2017 13:57   Ref. Range 12/12/2016 09:12   Calcium, Urine Latest Ref Range: 0.0 - 15.0 mg/dL 12.6   Calcium, 24H Urine Latest Ref Range: 4 - 12 mg/Hr 10   CA Urine (mg/Spec) Latest Units: mg/Spec 234       Patient also has Hep C and is currently being evaluated by Hepatology     Review of Systems   Constitutional: Positive for fatigue. Negative for unexpected weight change.   HENT: Negative for sore throat, trouble swallowing and voice change.    Eyes: Negative for visual disturbance.   Respiratory: Negative for cough and shortness of breath.    Cardiovascular: Negative for chest pain, palpitations and leg swelling.   Gastrointestinal: Negative for abdominal pain, constipation, diarrhea, nausea and vomiting.   Endocrine: Negative for cold intolerance, heat intolerance, polydipsia, polyphagia and polyuria.    Genitourinary: Negative for dysuria.   Musculoskeletal: Positive for back pain. Negative for myalgias.   Skin: Negative for rash.   Allergic/Immunologic: Negative for immunocompromised state.   Neurological: Negative for tremors and headaches.   Hematological: Does not bruise/bleed easily.   Psychiatric/Behavioral: The patient is not nervous/anxious.        Objective:      Physical Exam   Constitutional: He is oriented to person, place, and time. He appears well-developed. No distress.   HENT:   Right Ear: External ear normal.   Left Ear: External ear normal.   Nose: Nose normal.   Hearing normal    Neck: No tracheal deviation present. No thyromegaly present.   Cardiovascular: Normal rate.    No murmur heard.  Pulmonary/Chest: Effort normal and breath sounds normal.   Abdominal: Soft. He exhibits no mass.   No hernia noted   Musculoskeletal: He exhibits no edema.   Neurological: He is alert and oriented to person, place, and time. No cranial nerve deficit or sensory deficit. Coordination and gait normal.        Skin: Skin is warm. No rash noted.   No nodules   Psychiatric: He has a normal mood and affect. Judgment normal.   Nursing note and vitals reviewed.      Results for JUAN SMALLWOOD (MRN [id number]) as of 4/24/2017 12:16   Ref. Range 4/17/2017 10:35   Sodium Latest Ref Range: 136 - 145 mmol/L 137   Potassium Latest Ref Range: 3.5 - 5.1 mmol/L 4.5   Chloride Latest Ref Range: 95 - 110 mmol/L 104   CO2 Latest Ref Range: 23 - 29 mmol/L 26   Anion Gap Latest Ref Range: 8 - 16 mmol/L 7 (L)   BUN, Bld Latest Ref Range: 6 - 20 mg/dL 15   Creatinine Latest Ref Range: 0.5 - 1.4 mg/dL 1.0   eGFR if non African American Latest Ref Range: >60 mL/min/1.73 m^2 >60   eGFR if  Latest Ref Range: >60 mL/min/1.73 m^2 >60   Glucose Latest Ref Range: 70 - 110 mg/dL 104   Calcium Latest Ref Range: 8.7 - 10.5 mg/dL 10.7 (H)   Phosphorus Latest Ref Range: 2.7 - 4.5 mg/dL 2.0 (L)   Albumin Latest Ref Range: 3.5 - 5.2  g/dL 3.5   Vit D, 25-Hydroxy Latest Ref Range: 30 - 96 ng/mL 26 (L)   PTH Latest Ref Range: 9.0 - 77.0 pg/mL 127.0 (H)     NM Parathyroid scan from 3/17/2017:   Findings: Mildly irregular activity in the thyroid gland most likely related to thyroid nodules as seen on the recent ultrasound of the neck. Otherwise, no evidence of any differential washout       Impression           No scintigraphic evidence of parathyroid adenoma in the neck or the mediastinum.       Thyroid ultrasound from 3/17/2017:   Impression        1.) 1.35 x 0.79 x 1.01 solid heterogeneous nodule is seen in the right mid pole that does not meet criteria for FNA biopsy    2.) There are two foci inferior to the right and left thyroid lobes that are most likely enlarged reactive lymph nodes with the presence of a fatty hilum, but could represent parathyroid adenomas         Assessment:       1. Primary hyperparathyroidism    2. Vitamin D insufficiency    3. Chronic hepatitis C without hepatic coma        Plan:       1. Primary hyperparathyroidism   - in the setting of persistent hypercalcemia and vitamin D insufficiency   - case discussed in consultation with Dr. Balderas and Dr. Garcia   - recommendation: parathyroidectomy due to serum calcium >1.0 mg/dL above upper limit of normal   - we recommend referral to Dr. Boston or Dr. Hall   - pt prefers to discuss with general surgeon at [facility]     2. Vitamin d deficiency   - continue Ergo as prescribed     3. Chronic Hep C   - followed by Hepatology     Case discussed in consultation with Dr. Balderas who recommended referral to Dr. Boston or Dr. Hall   Recommendations were discussed with the patient in detail       The patient will notify our office with his decision regarding surgery   Pt given contact information

## 2017-04-24 NOTE — MR AVS SNAPSHOT
[facility] End[facility]/Diab/Metab  [address]  [facility] [address]  Phone: [phone]  Fax: [phone]                  Lucio Mendoza   2017 9:30 AM   Office Visit    Description:  Male : 1960   Provider:  Celine Sandoval NP   Department:  [facility] - Endo/Diab/Metab           Reason for Visit     Hyperparathyroidism           Diagnoses this Visit        Comments    Primary hyperparathyroidism    -  Primary     Vitamin D insufficiency                To Do List           Future Appointments        Provider Department Dept Phone    2017 11:45 AM [facility] CT1 64- LIMIT 450 LBS [facility] [phone]      Goals (5 Years of Data)     None       These Medications        Disp Refills Start End    ergocalciferol (ERGOCALCIFEROL) 50,000 unit Cap 12 capsule 3 2017     Take 1 capsule (50,000 Units total) by mouth every 7 days. - Oral    Pharmacy: [facility] [address] [place] [address] [place] [address] AT [facility] #: [phone]         [facility]     [facility] Nurse Care Line -  Assistance  Unless otherwise directed by your provider, please contact [facility], our nurse care line that is available for  assistance.     Registered nurses in the [facility] provide: appointment scheduling, clinical advisement, health education, and other advisory services.  Call: [phone] (toll free)               Medications                Verify that the below list of medications is an accurate representation of the medications you are currently taking.  If none reported, the list may be blank. If incorrect, please contact your healthcare provider. Carry this list with you in case of emergency.           Current Medications     alprazolam (XANAX) 1 MG tablet Take 1 mg by mouth 2 (two) times daily.    aspirin (ECOTRIN) 81 MG EC tablet Take 81 mg by mouth once daily.    diclofenac (VOLTAREN) 75  "MG EC tablet Take 75 mg by mouth once daily.     duloxetine (CYMBALTA) 20 MG capsule Take 10 mg by mouth once daily.    ergocalciferol (ERGOCALCIFEROL) 50,000 unit Cap Take 1 capsule (50,000 Units total) by mouth every 7 days.    methocarbamol (ROBAXIN) 750 MG Tab Take 750 mg by mouth once daily.     oxycodone-acetaminophen (PERCOCET)  mg per tablet TAKE ONE TABLET BY MOUTH ONE TO TWO TIMES A DAY AS NEEDED    tramadol (ULTRAM) 50 mg tablet Take 50 mg by mouth every 4 (four) hours as needed.     triamcinolone acetonide 0.1% (KENALOG) 0.1 % cream APPLY TO AFFECTED AREA TWICE DAILY    zolpidem (AMBIEN) 10 mg Tab Take 5 mg by mouth nightly as needed.           Clinical Reference Information           Your Vitals Were     BP Pulse Resp Height Weight BMI    132/88 (BP Location: Left arm, Patient Position: Sitting, BP Method: Manual) 76 16 5' 11" (1.803 m) 96 kg (211 lb 10.3 oz) 29.52 kg/m2      Blood Pressure          Most Recent Value    BP  132/88      Allergies as of 4/24/2017     No Known Allergies      Immunizations Administered on Date of Encounter - 4/24/2017     None      Language Assistance Services     ATTENTION: Language assistance services are available, free of charge. Please call [phone].      ATENCIÓN: Si michealla carlos, tiene a pan disposición servicios gratuitos de asistencia lingüística. Llame al [phone].     CHÚ Ý: N?u b?n nói Ti?ng Vi?t, có các d?ch v? h? tr? ngôn ng? mi?n phí dành cho b?n. G?i s? [phone].         [facility] complies with applicable Federal civil rights laws and does not discriminate on the basis of race, color, national origin, age, disability, or sex.        "

## 2017-05-01 ENCOUNTER — TELEPHONE (OUTPATIENT)
Dept: HEPATOLOGY | Facility: CLINIC | Age: 57
End: 2017-05-01

## 2017-05-01 DIAGNOSIS — B18.2 CHRONIC HEPATITIS C WITHOUT HEPATIC COMA: Primary | ICD-10-CM

## 2017-05-01 NOTE — TELEPHONE ENCOUNTER
PC to pt and caretaker Deisy:    tpCT showed no liver lesions  Next HCC screening due 10/2017 w/ U/S  Clear for surgery (from hepatic perspective) w/ low increased risk   (Msg already sent to Dr Masterson's office)    Can move forward w/ HCV rx after surgery or can discuss at next visit 10/2017  ___________________________________________________________  pls enter recall for CBC, CMP, INR, AFP, U/S ABDOMEN AND VISIT 10/2017    thanks

## 2017-05-01 NOTE — TELEPHONE ENCOUNTER
----- Message from Stalin Amezcua MD sent at 4/28/2017 10:23 AM CDT -----  US 6 months after last imaging    ----- Message -----     From: Jennifer B. Scheuermann, PA     Sent: 4/25/2017   5:12 PM       To: Stalin Amezcua MD    58 y/o WM w/ well compensated HCV cirrhosis  U/S 12/2016 - no liver lesions  CT renal stone study (no contrast) 12/2016 - Subcentimeter low-density lesion,  incompletely characterized    tpCT 4/2017 - no liver lesion   AFP is 11    What / when do you recommend next HCC surveillance?

## 2017-05-19 ENCOUNTER — OFFICE VISIT (OUTPATIENT)
Dept: SURGERY | Facility: CLINIC | Age: 57
End: 2017-05-19
Payer: MEDICARE

## 2017-05-19 VITALS
HEIGHT: 71 IN | DIASTOLIC BLOOD PRESSURE: 77 MMHG | HEART RATE: 78 BPM | WEIGHT: 211.88 LBS | SYSTOLIC BLOOD PRESSURE: 124 MMHG | TEMPERATURE: 98 F | BODY MASS INDEX: 29.66 KG/M2

## 2017-05-19 DIAGNOSIS — E83.52 HYPERCALCEMIA: ICD-10-CM

## 2017-05-19 DIAGNOSIS — E21.0 PRIMARY HYPERPARATHYROIDISM: Primary | ICD-10-CM

## 2017-05-19 DIAGNOSIS — B18.2 CHRONIC HEPATITIS C WITHOUT HEPATIC COMA: ICD-10-CM

## 2017-05-19 PROCEDURE — 1160F RVW MEDS BY RX/DR IN RCRD: CPT | Mod: S$GLB,,, | Performed by: SURGERY

## 2017-05-19 PROCEDURE — 99999 PR PBB SHADOW E&M-EST. PATIENT-LVL IV: CPT | Mod: PBBFAC,,, | Performed by: SURGERY

## 2017-05-19 PROCEDURE — 99499 UNLISTED E&M SERVICE: CPT | Mod: S$GLB,,, | Performed by: SURGERY

## 2017-05-19 PROCEDURE — 99215 OFFICE O/P EST HI 40 MIN: CPT | Mod: 57,S$GLB,, | Performed by: SURGERY

## 2017-05-19 RX ORDER — SODIUM CHLORIDE 9 MG/ML
INJECTION, SOLUTION INTRAVENOUS CONTINUOUS
Status: CANCELLED | OUTPATIENT
Start: 2017-05-19
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
## 2017-05-19 NOTE — MR AVS SNAPSHOT
[facility]  [address] [place] [address]  [address]th [address] [place] [address]  Phone: [phone]                  Lucio Mendoza   2017 1:20 PM   Office Visit    Description:  Male : 1960   Provider:  Diamante Masterson MD   Department:  [facility]                To Do List           Goals (5 Years of Data)     None      [facility]     [facility] Nurse Care Line -  Assistance  Unless otherwise directed by your provider, please contact [facility], our nurse care line that is available for  assistance.     Registered nurses in the [facility] provide: appointment scheduling, clinical advisement, health education, and other advisory services.  Call: [phone] (toll free)               Medications                Verify that the below list of medications is an accurate representation of the medications you are currently taking.  If none reported, the list may be blank. If incorrect, please contact your healthcare provider. Carry this list with you in case of emergency.           Current Medications     alprazolam (XANAX) 1 MG tablet Take 1 mg by mouth 2 (two) times daily.    aspirin (ECOTRIN) 81 MG EC tablet Take 81 mg by mouth once daily.    diclofenac (VOLTAREN) 75 MG EC tablet Take 75 mg by mouth once daily.     duloxetine (CYMBALTA) 20 MG capsule Take 10 mg by mouth once daily.    ergocalciferol (ERGOCALCIFEROL) 50,000 unit Cap Take 1 capsule (50,000 Units total) by mouth every 7 days.    oxycodone-acetaminophen (PERCOCET)  mg per tablet TAKE ONE TABLET BY MOUTH ONE TO TWO TIMES A DAY AS NEEDED    triamcinolone acetonide 0.1% (KENALOG) 0.1 % cream APPLY TO AFFECTED AREA TWICE DAILY    zolpidem (AMBIEN) 10 mg Tab Take 5 mg by mouth nightly as needed.    methocarbamol (ROBAXIN) 750 MG Tab Take 750 mg by mouth once daily.     tramadol (ULTRAM) 50 mg tablet Take 50 mg by mouth every 4 (four) hours as needed.            Clinical Reference  "Information           Your Vitals Were     BP Pulse Temp Height Weight BMI    124/77 (BP Location: Right arm, Patient Position: Sitting) 78 98.1 °F (36.7 °C) (Oral) 5' 11" (1.803 m) 96.1 kg (211 lb 13.8 oz) 29.55 kg/m2      Blood Pressure          Most Recent Value    BP  124/77      Allergies as of 5/19/2017     No Known Allergies      Immunizations Administered on Date of Encounter - 5/19/2017     None      Language Assistance Services     ATTENTION: Language assistance services are available, free of charge. Please call [phone].      ATENCIÓN: Si habla español, tiene a pan disposición servicios gratuitos de asistencia lingüística. Llame al [phone].     CHÚ Ý: N?u b?n nói Ti?ng Vi?t, có các d?ch v? h? tr? ngôn ng? mi?n phí dành cho b?n. G?i s? [phone].         [facility] complies with applicable Federal civil rights laws and does not discriminate on the basis of race, color, national origin, age, disability, or sex.        "

## 2017-05-22 ENCOUNTER — TELEPHONE (OUTPATIENT)
Dept: SURGERY | Facility: CLINIC | Age: 57
End: 2017-05-22

## 2017-05-22 NOTE — TELEPHONE ENCOUNTER
----- Message from Diamante Masterson MD sent at 5/19/2017  2:18 PM CDT -----  Surgery scheduled 6/8, please let patient know date.   Please make a note to remind patient 7 days prior to surgery to STOP ASPIRIN    sm    05/22/17   1134  Attempted to contact the pt to inform of surgery date and medication instructions. No answer. Left voicemail on both numbers listed. Will attempt to contact again at a later time.

## 2017-05-22 NOTE — TELEPHONE ENCOUNTER
----- Message from Diamante Masterson MD sent at 5/19/2017  2:18 PM CDT -----  Surgery scheduled 6/8, please let patient know date.   Please make a note to remind patient 7 days prior to surgery to STOP ASPIRIN    sm    05/22/17   3906  Pt's wife informed of surgery date and medication instructions. Date and time of preop appt confirmed. Pt verbalized understanding.

## 2017-05-24 ENCOUNTER — TELEPHONE (OUTPATIENT)
Dept: SURGERY | Facility: CLINIC | Age: 57
End: 2017-05-24

## 2017-05-24 NOTE — TELEPHONE ENCOUNTER
----- Message from Kailyn Greer sent at 5/24/2017 11:54 AM CDT -----  Laura with [facility] called regarding the CPT code.  Please call.   Ext. [phone]        05/24/17   5329  Spoke to Laura and informed her that pt 's surgery is an Outpatient Surgery. Laura verbalized understanding.

## 2017-05-30 ENCOUNTER — TELEPHONE (OUTPATIENT)
Dept: SURGERY | Facility: CLINIC | Age: 57
End: 2017-05-30

## 2017-06-01 ENCOUNTER — TELEPHONE (OUTPATIENT)
Dept: SURGERY | Facility: CLINIC | Age: 57
End: 2017-06-01

## 2017-06-01 ENCOUNTER — HOSPITAL ENCOUNTER (OUTPATIENT)
Dept: RADIOLOGY | Facility: HOSPITAL | Age: 57
Discharge: HOME | End: 2017-06-01
Attending: SURGERY
Payer: MEDICARE

## 2017-06-01 DIAGNOSIS — E21.3 HYPERPARATHYROIDISM: ICD-10-CM

## 2017-06-01 DIAGNOSIS — E21.3 HYPERPARATHYROIDISM: Primary | ICD-10-CM

## 2017-06-01 PROCEDURE — 70492 CT SFT TSUE NCK W/O & W/DYE: CPT | Mod: TC

## 2017-06-01 PROCEDURE — 25500020 PHARM REV CODE 255: Performed by: SURGERY

## 2017-06-01 PROCEDURE — 70492 CT SFT TSUE NCK W/O & W/DYE: CPT | Mod: 26,,, | Performed by: RADIOLOGY

## 2017-06-01 RX ADMIN — IOHEXOL 75 ML: 350 INJECTION, SOLUTION INTRAVENOUS at 10:06

## 2017-06-01 NOTE — TELEPHONE ENCOUNTER
Minda, Can you arrange a CT scan parathyroid protocol (ORDER IS IN as CT neck) with Mr. Mendoza, to take place prior to surgery to help find the abnormal parathyroid? I can call him later today after cases if he has any questions.

## 2017-06-01 NOTE — TELEPHONE ENCOUNTER
06/01/17   0948  Spoke to pt who states someone from [facility] had already been in touch with him regarding coming in for CT. Pt states he was told to come in at 10 for testing. Pt informed that he has to come at 10 to drink oral contrast, and that's why he has to come in at 10. Pt verbalized understanding, and states he will be here.

## 2017-06-01 NOTE — TELEPHONE ENCOUNTER
----- Message from Albin Moncada sent at 6/1/2017  9:22 AM CDT -----  Contact: Inocencia from [facility]/ext. [phone]  [facility] called to try to find patient since Radiology advised they could see them now.    06/01/17   0926  Attempted to contact the pt on all numbers listed. No answer. Left voicemail to return call regarding scheduling CT. Will attempt to contact pt again at a later time.

## 2017-06-02 ENCOUNTER — HOSPITAL ENCOUNTER (OUTPATIENT)
Dept: PREADMISSION TESTING | Facility: HOSPITAL | Age: 57
Discharge: HOME | End: 2017-06-02
Attending: SURGERY
Payer: MEDICARE

## 2017-06-02 ENCOUNTER — HOSPITAL ENCOUNTER (OUTPATIENT)
Dept: RADIOLOGY | Facility: HOSPITAL | Age: 57
Discharge: HOME | End: 2017-06-02
Attending: SURGERY
Payer: MEDICARE

## 2017-06-02 ENCOUNTER — ANESTHESIA EVENT (OUTPATIENT)
Dept: SURGERY | Facility: HOSPITAL | Age: 57
End: 2017-06-02
Payer: MEDICARE

## 2017-06-02 VITALS
WEIGHT: 212.5 LBS | OXYGEN SATURATION: 99 % | HEIGHT: 71 IN | DIASTOLIC BLOOD PRESSURE: 75 MMHG | BODY MASS INDEX: 29.75 KG/M2 | RESPIRATION RATE: 18 BRPM | SYSTOLIC BLOOD PRESSURE: 135 MMHG | HEART RATE: 71 BPM

## 2017-06-02 DIAGNOSIS — E21.0 PRIMARY HYPERPARATHYROIDISM: ICD-10-CM

## 2017-06-02 DIAGNOSIS — B18.2 CHRONIC HEPATITIS C WITHOUT HEPATIC COMA: ICD-10-CM

## 2017-06-02 PROCEDURE — 71020 XR CHEST PA AND LATERAL: CPT | Mod: 26,,, | Performed by: RADIOLOGY

## 2017-06-02 PROCEDURE — 71020 XR CHEST PA AND LATERAL: CPT | Mod: TC

## 2017-06-02 PROCEDURE — 93005 ELECTROCARDIOGRAM TRACING: CPT

## 2017-06-02 PROCEDURE — 93010 ELECTROCARDIOGRAM REPORT: CPT | Mod: ,,, | Performed by: INTERNAL MEDICINE

## 2017-06-02 RX ORDER — SODIUM CHLORIDE, SODIUM LACTATE, POTASSIUM CHLORIDE, CALCIUM CHLORIDE 600; 310; 30; 20 MG/100ML; MG/100ML; MG/100ML; MG/100ML
INJECTION, SOLUTION INTRAVENOUS CONTINUOUS
Status: CANCELLED | OUTPATIENT
Start: 2017-06-02

## 2017-06-02 RX ORDER — LIDOCAINE HYDROCHLORIDE 10 MG/ML
1 INJECTION, SOLUTION EPIDURAL; INFILTRATION; INTRACAUDAL; PERINEURAL ONCE
Status: CANCELLED | OUTPATIENT
Start: 2017-06-02 | End: 2017-06-02

## 2017-06-02 NOTE — DISCHARGE INSTRUCTIONS
Your surgery is scheduled for 6/8/17.    Please report to Outpatient Surgery Intake Office on the 2nd FLOOR at 5:30 a.m.          INSTRUCTIONS IMPORTANT!!!  ¨ Do not eat or drink after 12 midnight-including water. OK to brush teeth, no   gum, candy or mints!      ____  Proceed to [facility] on 6/2/17 for additional blood test.        ____  No powder, lotions or creams to surgical area.  ____  Please remove all jewelry, including piercings and leave at home.  ____  No money or valuables needed. Please leave at home.  ____  Please bring any documents given by your doctor.  ____  If going home the same day, arrange for a ride home. You will not be able to             drive if Anesthesia was used.  ____  Wear loose fitting clothing. Allow for dressings, bandages.  ____  Stop Aspirin, Ibuprofen, Motrin and Aleve at least 3-5 days before surgery, unless otherwise instructed by your doctor, or the nurse.   You MAY use Tylenol/acetaminophen until day of surgery.  ____  Wash the surgical area with Hibiclens the night before surgery, and again the             morning of surgery.  Be sure to rinse hibiclens off completely (if instructed by   nurse).  ____  If you take diabetic medication, do not take am of surgery unless instructed by Doctor.  ____  Call MD for temperature above 101 degrees.  ____ Stop taking any Fish Oil supplement or any Vitamins that contain Vitamin E at least 5 days prior to surgery.  ____ Do Not wear your contact lenses the day of your procedure.  You may wear your glasses.        I have read or had read and explained to me, and understand the above information.  Additional comments or instructions:  For additional questions call [phone]     Take a Hibiclens shower twice a day for 3 days prior to surgery, including the morning of surgery.   Gargle with Listerine twice a day for 3 days prior to surgery, including the morning of surgery.      Pre-Op Bathing Instructions    Before surgery,  you can play an important role in your own health.    Because skin is not sterile, we need to be sure that your skin is as free of germs as possible. By following the instructions below, you can reduce the number of germs on your skin before surgery.    IMPORTANT: You will need to shower with a special soap called Hibiclens*, available at any pharmacy.  If you are allergic to Chlorhexidine (the antiseptic in Hibiclens), use an antibacterial soap such as Dial Soap for your preoperative shower.  You will shower with Hibiclens both the night before your surgery and the morning of your surgery.  Do not use Hibiclens on the head, face or genitals to avoid injury to those areas.    STEP #1: THE NIGHT BEFORE YOUR SURGERY     1. Do not shave the area of your body where your surgery will be performed.  2. Shower and wash your hair and body as usual with your normal soap and shampoo.  3. Rinse your hair and body thoroughly after you shower to remove all soap residue.  4. With your hand, apply one packet of Hibiclens soap to the surgical site.   5. Wash the site gently for five (5) minutes. Do not scrub your skin too hard.   6. Do not wash with your regular soap after Hibiclens is used.  7. Rinse your body thoroughly.  8. Pat yourself dry with a clean, soft towel.  9. Do not use lotion, cream, or powder.  10. Wear clean clothes.    STEP #2: THE MORNING OF YOUR SURGERY     1. Repeat Step #1.    * Not to be used by people allergic to Chlorhexidine.          Anesthesia: General Anesthesia  Youre due to have surgery. During surgery, youll be given medication called anesthesia. (It is also called anesthetic.) This will keep you comfortable and pain-free. Your anesthesia provider will use general anesthesia. This sheet tells you more about it.  What is general anesthesia?     You are watched continuously during your procedure by the anesthesia provider   General anesthesia puts you into a state like deep sleep. It goes into the  bloodstream (IV anesthetics), into the lungs (gas anesthetics), or both. You feel nothing during the procedure. You will not remember it. During the procedure, the anesthesia provider monitors you continuously. He or she checks your heart rate and rhythm, blood pressure, breathing, and blood oxygen.  · IV Anesthetics. IV anesthetics are given through an IV line in your arm. Theyre often given first. This is so you are asleep before a gas anesthetic is started. Some kinds of IV anesthetics relieve pain. Others relax you. Your doctor will decide which kind is best in your case.  · Gas Anesthetics. Gas anesthetics are breathed into the lungs. They are often used to keep you asleep. They can be given through a facemask or a tube placed in your larynx or trachea (breathing tube).  ¨ If you have a facemask, your anesthesia provider will most likely place it over your nose and mouth while youre still awake. Youll breathe oxygen through the mask as your IV anesthetic is started. Gas anesthetic may be added through the mask.  ¨ If you have a tube in the larynx or trachea, it will be inserted into your throat after youre asleep.  Anesthesia tools and medications  You will likely have:  · IV anesthetics. These are put into an IV line into your bloodstream.  · Gas anesthetics. You breathe these anesthetics into your lungs, where they pass into your bloodstream.  · Pulse oximeter. This is a small clip that is attached to the end of your finger. This measures your blood oxygen level.  · Electrocardiography leads (electrodes). These are small sticky pads that are placed on your chest. They record your heart rate and rhythm.  · Blood pressure cuff. This reads your blood pressure.  Risks and possible complications  General anesthesia has some risks. These include:  · Breathing problems  · Nausea and vomiting  · Sore throat or hoarseness (usually temporary)  · Allergic reaction to the anesthetic  · Irregular heartbeat  (rare)  · Cardiac arrest (rare)   Anesthesia safety  · Follow all instructions you are given for how long not to eat or drink before your procedure.  · Be sure your doctor knows what medications and drugs you take. This includes over-the-counter medications, herbs, supplements, alcohol or other drugs. You will be asked when those were last taken.  · Have an adult family member or friend drive you home after the procedure.  · For the first 24 hours after your surgery:  ¨ Do not drive or use heavy equipment.  ¨ Have a trusted family member or spouse make important decisions or sign documents.  ¨ Avoid alcohol.  ¨ Have a responsible adult stay with you. He or she can watch for problems and help keep you safe.  Date Last Reviewed: 10/16/2014  © 4763-9307 [employer]. [address] [place] [address], [place] [address]. All rights reserved. This information is not intended as a substitute for professional medical care. Always follow your healthcare professional's instructions.
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
## 2017-06-08 ENCOUNTER — HOSPITAL ENCOUNTER (OUTPATIENT)
Facility: HOSPITAL | Age: 57
LOS: 1 days | Discharge: HOME | End: 2017-06-10
Attending: SURGERY | Admitting: SURGERY
Payer: MEDICARE

## 2017-06-08 ENCOUNTER — ANESTHESIA (OUTPATIENT)
Dept: SURGERY | Facility: HOSPITAL | Age: 57
End: 2017-06-08
Payer: MEDICARE

## 2017-06-08 DIAGNOSIS — E21.0 PRIMARY HYPERPARATHYROIDISM: ICD-10-CM

## 2017-06-08 DIAGNOSIS — B18.2 CHRONIC HEPATITIS C WITHOUT HEPATIC COMA: ICD-10-CM

## 2017-06-08 PROBLEM — E55.9 VITAMIN D INSUFFICIENCY: Status: RESOLVED | Noted: 2017-02-13 | Resolved: 2017-06-08

## 2017-06-08 LAB
ALBUMIN SERPL BCP-MCNC: 3.5 G/DL
ALBUMIN SERPL BCP-MCNC: 3.6 G/DL
CALCIUM SERPL-MCNC: 10.2 MG/DL
CALCIUM SERPL-MCNC: 10.7 MG/DL
INR PPP: 1.1
PROTHROMBIN TIME: 12 SEC
PTH-INTACT SERPL-MCNC: 115 PG/ML
PTH-INTACT SERPL-MCNC: 118.3 PG/ML
PTH-INTACT SERPL-MCNC: 137.9 PG/ML

## 2017-06-08 PROCEDURE — 71000039 HC RECOVERY, EACH ADD'L HOUR: Performed by: SURGERY

## 2017-06-08 PROCEDURE — 25000003 PHARM REV CODE 250: Performed by: SURGERY

## 2017-06-08 PROCEDURE — 63600175 PHARM REV CODE 636 W HCPCS: Performed by: NURSE ANESTHETIST, CERTIFIED REGISTERED

## 2017-06-08 PROCEDURE — 88305 TISSUE EXAM BY PATHOLOGIST: CPT | Mod: 26,,, | Performed by: PATHOLOGY

## 2017-06-08 PROCEDURE — 82040 ASSAY OF SERUM ALBUMIN: CPT | Mod: 91

## 2017-06-08 PROCEDURE — 25000003 PHARM REV CODE 250: Performed by: NURSE ANESTHETIST, CERTIFIED REGISTERED

## 2017-06-08 PROCEDURE — 36000707: Performed by: SURGERY

## 2017-06-08 PROCEDURE — 36415 COLL VENOUS BLD VENIPUNCTURE: CPT

## 2017-06-08 PROCEDURE — 88305 TISSUE EXAM BY PATHOLOGIST: CPT | Performed by: PATHOLOGY

## 2017-06-08 PROCEDURE — 36000706: Performed by: SURGERY

## 2017-06-08 PROCEDURE — 63600175 PHARM REV CODE 636 W HCPCS: Performed by: SURGERY

## 2017-06-08 PROCEDURE — 37000008 HC ANESTHESIA 1ST 15 MINUTES: Performed by: SURGERY

## 2017-06-08 PROCEDURE — 71000033 HC RECOVERY, INTIAL HOUR: Performed by: SURGERY

## 2017-06-08 PROCEDURE — 83970 ASSAY OF PARATHORMONE: CPT

## 2017-06-08 PROCEDURE — 85610 PROTHROMBIN TIME: CPT

## 2017-06-08 PROCEDURE — 27201423 OPTIME MED/SURG SUP & DEVICES STERILE SUPPLY: Performed by: SURGERY

## 2017-06-08 PROCEDURE — 37000009 HC ANESTHESIA EA ADD 15 MINS: Performed by: SURGERY

## 2017-06-08 PROCEDURE — 82310 ASSAY OF CALCIUM: CPT

## 2017-06-08 PROCEDURE — 63600175 PHARM REV CODE 636 W HCPCS: Performed by: ANESTHESIOLOGY

## 2017-06-08 PROCEDURE — 60500 EXPLORE PARATHYROID GLANDS: CPT | Mod: ,,, | Performed by: SURGERY

## 2017-06-08 PROCEDURE — 83970 ASSAY OF PARATHORMONE: CPT | Mod: 91

## 2017-06-08 RX ORDER — PROPOFOL 10 MG/ML
VIAL (ML) INTRAVENOUS
Status: DISCONTINUED | OUTPATIENT
Start: 2017-06-08 | End: 2017-06-08

## 2017-06-08 RX ORDER — SODIUM CHLORIDE 0.9 % (FLUSH) 0.9 %
3 SYRINGE (ML) INJECTION
Status: DISCONTINUED | OUTPATIENT
Start: 2017-06-08 | End: 2017-06-10 | Stop reason: HOSPADM

## 2017-06-08 RX ORDER — ONDANSETRON 2 MG/ML
INJECTION INTRAMUSCULAR; INTRAVENOUS
Status: DISCONTINUED | OUTPATIENT
Start: 2017-06-08 | End: 2017-06-08

## 2017-06-08 RX ORDER — MELOXICAM 7.5 MG/1
7.5 TABLET ORAL DAILY
COMMUNITY
End: 2018-05-16

## 2017-06-08 RX ORDER — FENTANYL CITRATE 50 UG/ML
INJECTION, SOLUTION INTRAMUSCULAR; INTRAVENOUS
Status: DISCONTINUED | OUTPATIENT
Start: 2017-06-08 | End: 2017-06-08

## 2017-06-08 RX ORDER — SODIUM CHLORIDE 0.9 % (FLUSH) 0.9 %
3 SYRINGE (ML) INJECTION EVERY 8 HOURS
Status: DISCONTINUED | OUTPATIENT
Start: 2017-06-08 | End: 2017-06-08 | Stop reason: HOSPADM

## 2017-06-08 RX ORDER — DEXAMETHASONE SODIUM PHOSPHATE 4 MG/ML
INJECTION, SOLUTION INTRA-ARTICULAR; INTRALESIONAL; INTRAMUSCULAR; INTRAVENOUS; SOFT TISSUE
Status: DISCONTINUED | OUTPATIENT
Start: 2017-06-08 | End: 2017-06-08

## 2017-06-08 RX ORDER — LIDOCAINE HYDROCHLORIDE 10 MG/ML
1 INJECTION, SOLUTION EPIDURAL; INFILTRATION; INTRACAUDAL; PERINEURAL ONCE
Status: DISCONTINUED | OUTPATIENT
Start: 2017-06-08 | End: 2017-06-08 | Stop reason: HOSPADM

## 2017-06-08 RX ORDER — HYDROMORPHONE HYDROCHLORIDE 1 MG/ML
1 INJECTION, SOLUTION INTRAMUSCULAR; INTRAVENOUS; SUBCUTANEOUS
Status: DISCONTINUED | OUTPATIENT
Start: 2017-06-08 | End: 2017-06-10

## 2017-06-08 RX ORDER — MIDAZOLAM HYDROCHLORIDE 1 MG/ML
2 INJECTION INTRAMUSCULAR; INTRAVENOUS ONCE
Status: COMPLETED | OUTPATIENT
Start: 2017-06-08 | End: 2017-06-08

## 2017-06-08 RX ORDER — ROCURONIUM BROMIDE 10 MG/ML
INJECTION, SOLUTION INTRAVENOUS
Status: DISCONTINUED | OUTPATIENT
Start: 2017-06-08 | End: 2017-06-08

## 2017-06-08 RX ORDER — ONDANSETRON 2 MG/ML
4 INJECTION INTRAMUSCULAR; INTRAVENOUS ONCE AS NEEDED
Status: DISCONTINUED | OUTPATIENT
Start: 2017-06-08 | End: 2017-06-08 | Stop reason: HOSPADM

## 2017-06-08 RX ORDER — ACETAMINOPHEN 325 MG/1
650 TABLET ORAL EVERY 4 HOURS PRN
Status: DISCONTINUED | OUTPATIENT
Start: 2017-06-08 | End: 2017-06-10 | Stop reason: HOSPADM

## 2017-06-08 RX ORDER — CEFAZOLIN SODIUM 2 G/50ML
2 SOLUTION INTRAVENOUS
Status: COMPLETED | OUTPATIENT
Start: 2017-06-08 | End: 2017-06-08

## 2017-06-08 RX ORDER — RAMELTEON 8 MG/1
8 TABLET ORAL NIGHTLY PRN
Status: DISCONTINUED | OUTPATIENT
Start: 2017-06-08 | End: 2017-06-09

## 2017-06-08 RX ORDER — HYDROMORPHONE HYDROCHLORIDE 2 MG/ML
0.5 INJECTION, SOLUTION INTRAMUSCULAR; INTRAVENOUS; SUBCUTANEOUS EVERY 5 MIN PRN
Status: DISCONTINUED | OUTPATIENT
Start: 2017-06-08 | End: 2017-06-08 | Stop reason: HOSPADM

## 2017-06-08 RX ORDER — LIDOCAINE HCL/PF 100 MG/5ML
SYRINGE (ML) INTRAVENOUS
Status: DISCONTINUED | OUTPATIENT
Start: 2017-06-08 | End: 2017-06-08

## 2017-06-08 RX ORDER — HYDROMORPHONE HYDROCHLORIDE 1 MG/ML
0.5 INJECTION, SOLUTION INTRAMUSCULAR; INTRAVENOUS; SUBCUTANEOUS ONCE
Status: COMPLETED | OUTPATIENT
Start: 2017-06-08 | End: 2017-06-08

## 2017-06-08 RX ORDER — ONDANSETRON 2 MG/ML
4 INJECTION INTRAMUSCULAR; INTRAVENOUS EVERY 8 HOURS PRN
Status: DISCONTINUED | OUTPATIENT
Start: 2017-06-08 | End: 2017-06-10 | Stop reason: HOSPADM

## 2017-06-08 RX ORDER — SODIUM CHLORIDE 9 MG/ML
INJECTION, SOLUTION INTRAVENOUS CONTINUOUS
Status: DISCONTINUED | OUTPATIENT
Start: 2017-06-08 | End: 2017-06-09

## 2017-06-08 RX ORDER — MIDAZOLAM HYDROCHLORIDE 1 MG/ML
INJECTION, SOLUTION INTRAMUSCULAR; INTRAVENOUS
Status: DISCONTINUED | OUTPATIENT
Start: 2017-06-08 | End: 2017-06-08

## 2017-06-08 RX ORDER — BUPIVACAINE HYDROCHLORIDE AND EPINEPHRINE 2.5; 5 MG/ML; UG/ML
INJECTION, SOLUTION EPIDURAL; INFILTRATION; INTRACAUDAL; PERINEURAL
Status: DISCONTINUED | OUTPATIENT
Start: 2017-06-08 | End: 2017-06-08 | Stop reason: HOSPADM

## 2017-06-08 RX ORDER — SODIUM CHLORIDE, SODIUM LACTATE, POTASSIUM CHLORIDE, CALCIUM CHLORIDE 600; 310; 30; 20 MG/100ML; MG/100ML; MG/100ML; MG/100ML
INJECTION, SOLUTION INTRAVENOUS CONTINUOUS
Status: DISCONTINUED | OUTPATIENT
Start: 2017-06-08 | End: 2017-06-08

## 2017-06-08 RX ADMIN — FENTANYL CITRATE 100 MCG: 50 INJECTION, SOLUTION INTRAMUSCULAR; INTRAVENOUS at 09:06

## 2017-06-08 RX ADMIN — RAMELTEON 8 MG: 8 TABLET, FILM COATED ORAL at 09:06

## 2017-06-08 RX ADMIN — EPHEDRINE SULFATE 10 MG: 50 INJECTION, SOLUTION INTRAMUSCULAR; INTRAVENOUS; SUBCUTANEOUS at 10:06

## 2017-06-08 RX ADMIN — FENTANYL CITRATE 50 MCG: 50 INJECTION, SOLUTION INTRAMUSCULAR; INTRAVENOUS at 10:06

## 2017-06-08 RX ADMIN — FENTANYL CITRATE 50 MCG: 50 INJECTION, SOLUTION INTRAMUSCULAR; INTRAVENOUS at 11:06

## 2017-06-08 RX ADMIN — HYDROMORPHONE HYDROCHLORIDE 1 MG: 1 INJECTION, SOLUTION INTRAMUSCULAR; INTRAVENOUS; SUBCUTANEOUS at 07:06

## 2017-06-08 RX ADMIN — MIDAZOLAM 2 MG: 1 INJECTION INTRAMUSCULAR; INTRAVENOUS at 09:06

## 2017-06-08 RX ADMIN — CEFAZOLIN SODIUM 2 G: 2 SOLUTION INTRAVENOUS at 09:06

## 2017-06-08 RX ADMIN — LIDOCAINE HYDROCHLORIDE 80 MG: 20 INJECTION, SOLUTION INTRAVENOUS at 09:06

## 2017-06-08 RX ADMIN — HYDROMORPHONE HYDROCHLORIDE 0.5 MG: 2 INJECTION INTRAMUSCULAR; INTRAVENOUS; SUBCUTANEOUS at 01:06

## 2017-06-08 RX ADMIN — ROCURONIUM BROMIDE 50 MG: 10 INJECTION, SOLUTION INTRAVENOUS at 09:06

## 2017-06-08 RX ADMIN — HYDROMORPHONE HYDROCHLORIDE 1 MG: 1 INJECTION, SOLUTION INTRAMUSCULAR; INTRAVENOUS; SUBCUTANEOUS at 11:06

## 2017-06-08 RX ADMIN — HYDROMORPHONE HYDROCHLORIDE 0.5 MG: 1 INJECTION, SOLUTION INTRAMUSCULAR; INTRAVENOUS; SUBCUTANEOUS at 05:06

## 2017-06-08 RX ADMIN — SODIUM CHLORIDE: 0.9 INJECTION, SOLUTION INTRAVENOUS at 10:06

## 2017-06-08 RX ADMIN — SODIUM CHLORIDE: 0.9 INJECTION, SOLUTION INTRAVENOUS at 06:06

## 2017-06-08 RX ADMIN — PROPOFOL 150 MG: 10 INJECTION, EMULSION INTRAVENOUS at 09:06

## 2017-06-08 RX ADMIN — EPHEDRINE SULFATE 10 MG: 50 INJECTION, SOLUTION INTRAMUSCULAR; INTRAVENOUS; SUBCUTANEOUS at 11:06

## 2017-06-08 RX ADMIN — FENTANYL CITRATE 50 MCG: 50 INJECTION, SOLUTION INTRAMUSCULAR; INTRAVENOUS at 09:06

## 2017-06-08 RX ADMIN — ONDANSETRON 4 MG: 2 INJECTION, SOLUTION INTRAMUSCULAR; INTRAVENOUS at 09:06

## 2017-06-08 RX ADMIN — ROCURONIUM BROMIDE 10 MG: 10 INJECTION, SOLUTION INTRAVENOUS at 11:06

## 2017-06-08 RX ADMIN — MIDAZOLAM HYDROCHLORIDE 2 MG: 1 INJECTION, SOLUTION INTRAMUSCULAR; INTRAVENOUS at 08:06

## 2017-06-08 RX ADMIN — HYDROMORPHONE HYDROCHLORIDE 1 MG: 1 INJECTION, SOLUTION INTRAMUSCULAR; INTRAVENOUS; SUBCUTANEOUS at 02:06

## 2017-06-08 RX ADMIN — DEXAMETHASONE SODIUM PHOSPHATE 12 MG: 4 INJECTION, SOLUTION INTRAMUSCULAR; INTRAVENOUS at 09:06

## 2017-06-08 NOTE — TRANSFER OF CARE
"Anesthesia Transfer of Care Note    Patient: Lucio Mendoza    Procedure(s) Performed: Procedure(s) (LRB):  SUBTOTAL PARATHYROIDECTOMY (N/A)    Patient location: PACU    Anesthesia Type: general    Transport from OR: Transported from OR on 6-10 L/min O2 by face mask with adequate spontaneous ventilation    Post pain: adequate analgesia    Post assessment: no apparent anesthetic complications    Post vital signs: stable    Level of consciousness: awake, alert and oriented    Nausea/Vomiting: no nausea/vomiting    Complications: none    Transfer of care protocol was followed      Last vitals:   Visit Vitals  BP (!) 143/86 (BP Location: Right arm, Patient Position: Lying)   Pulse 74   Temp 36.8 °C (98.3 °F) (Oral)   Resp 20   Ht 5' 11" (1.803 m)   Wt 96.2 kg (212 lb)   SpO2 (!) 94%   BMI 29.57 kg/m²     "

## 2017-06-08 NOTE — ANESTHESIA POSTPROCEDURE EVALUATION
"Anesthesia Post Evaluation    Patient: Lucio Mendoza    Procedure(s) Performed: Procedure(s) (LRB):  SUBTOTAL PARATHYROIDECTOMY (N/A)    Final Anesthesia Type: general  Patient location during evaluation: PACU  Patient participation: Yes- Able to Participate  Level of consciousness: awake and alert  Post-procedure vital signs: reviewed and stable  Pain management: adequate  Airway patency: patent  PONV status at discharge: No PONV  Anesthetic complications: no      Cardiovascular status: blood pressure returned to baseline  Respiratory status: unassisted  Hydration status: euvolemic  Follow-up not needed.        Visit Vitals  BP (!) 168/94   Pulse 99   Temp 36.7 °C (98.1 °F) (Oral)   Resp 18   Ht 5' 11" (1.803 m)   Wt 96.2 kg (212 lb)   SpO2 (!) 94%   BMI 29.57 kg/m²       Pain/Radha Score: Pain Assessment Performed: Yes (6/8/2017  1:47 PM)  Presence of Pain: denies (6/8/2017  1:47 PM)  Pain Rating Prior to Med Admin: 6 (6/8/2017  1:20 PM)  Radha Score: 9 (6/8/2017  1:47 PM)      "

## 2017-06-08 NOTE — PLAN OF CARE
Spoke with family in waiting room and gave update on pts status. Will call back with rm assignment when i get one

## 2017-06-08 NOTE — INTERVAL H&P NOTE
The patient has been examined and the H&P has been reviewed:    I concur with the findings and no changes have occurred since H&P was written.    Anesthesia/Surgery risks, benefits and alternative options discussed and understood by patient/family.          Active Hospital Problems    Diagnosis  POA    Primary hyperparathyroidism [E21.0]  Yes      Resolved Hospital Problems    Diagnosis Date Resolved POA   No resolved problems to display.

## 2017-06-08 NOTE — BRIEF OP NOTE
[facility]  Surgery Department  Operative Note    SUMMARY     Date of Procedure: 6/8/2017     Procedure: Procedure(s) (LRB):  SUBTOTAL PARATHYROIDECTOMY (N/A)   4 gland exploration 3.5 parathyroidectomy      Surgeon(s) and Role:     * Diamante Masterson MD - Primary     * Allie Jose MD - Assisting        Pre-Operative Diagnosis: Hypercalcemia [E83.52]  Primary hyperparathyroidism [E21.0]    Post-Operative Diagnosis: Post-Op Diagnosis Codes:     * Hypercalcemia [E83.52]     * Primary hyperparathyroidism [E21.0]    Anesthesia: General    Technical Procedures Used:  open    Description of the Findings of the Procedure:  4 hypercellular glands      Significant Surgical Tasks Conducted by the Assistant(s), if Applicable:      Complications: No    Estimated Blood Loss (EBL):  25ml           Implants: * No implants in log *    Specimens:   Specimen (12h ago through future)    Start     Ordered    06/08/17 1226  Specimen to Pathology - Surgery  Once     Comments:  1. Left superior parathyroid gland- perm2. Right superior parathyroid gland- perm3. Right inferior parathyroid gland- perm4. Left inferior parathyroid gland- perm      06/08/17 1226                  Condition: Good    Disposition: PACU - hemodynamically stable.    Attestation: I was present and scrubbed for the entire procedure.

## 2017-06-09 LAB
ALBUMIN SERPL BCP-MCNC: 3.4 G/DL
CALCIUM SERPL-MCNC: 10.1 MG/DL
PTH-INTACT SERPL-MCNC: 124.6 PG/ML

## 2017-06-09 PROCEDURE — 82040 ASSAY OF SERUM ALBUMIN: CPT

## 2017-06-09 PROCEDURE — 94761 N-INVAS EAR/PLS OXIMETRY MLT: CPT

## 2017-06-09 PROCEDURE — 63600175 PHARM REV CODE 636 W HCPCS: Performed by: SURGERY

## 2017-06-09 PROCEDURE — 25000003 PHARM REV CODE 250: Performed by: SURGERY

## 2017-06-09 PROCEDURE — 36415 COLL VENOUS BLD VENIPUNCTURE: CPT

## 2017-06-09 PROCEDURE — 83970 ASSAY OF PARATHORMONE: CPT

## 2017-06-09 PROCEDURE — 97802 MEDICAL NUTRITION INDIV IN: CPT

## 2017-06-09 PROCEDURE — 82310 ASSAY OF CALCIUM: CPT

## 2017-06-09 PROCEDURE — 27000221 HC OXYGEN, UP TO 24 HOURS

## 2017-06-09 RX ORDER — ALPRAZOLAM 1 MG/1
1 TABLET ORAL 2 TIMES DAILY PRN
Status: DISCONTINUED | OUTPATIENT
Start: 2017-06-09 | End: 2017-06-10 | Stop reason: HOSPADM

## 2017-06-09 RX ORDER — ZOLPIDEM TARTRATE 5 MG/1
5 TABLET ORAL NIGHTLY PRN
Status: DISCONTINUED | OUTPATIENT
Start: 2017-06-09 | End: 2017-06-10 | Stop reason: HOSPADM

## 2017-06-09 RX ADMIN — HYDROMORPHONE HYDROCHLORIDE 1 MG: 1 INJECTION, SOLUTION INTRAMUSCULAR; INTRAVENOUS; SUBCUTANEOUS at 10:06

## 2017-06-09 RX ADMIN — ALPRAZOLAM 1 MG: 1 TABLET ORAL at 08:06

## 2017-06-09 RX ADMIN — HYDROMORPHONE HYDROCHLORIDE 1 MG: 1 INJECTION, SOLUTION INTRAMUSCULAR; INTRAVENOUS; SUBCUTANEOUS at 01:06

## 2017-06-09 RX ADMIN — HYDROMORPHONE HYDROCHLORIDE 1 MG: 1 INJECTION, SOLUTION INTRAMUSCULAR; INTRAVENOUS; SUBCUTANEOUS at 07:06

## 2017-06-09 RX ADMIN — SODIUM CHLORIDE: 0.9 INJECTION, SOLUTION INTRAVENOUS at 05:06

## 2017-06-09 RX ADMIN — HYDROMORPHONE HYDROCHLORIDE 1 MG: 1 INJECTION, SOLUTION INTRAMUSCULAR; INTRAVENOUS; SUBCUTANEOUS at 03:06

## 2017-06-09 RX ADMIN — HYDROMORPHONE HYDROCHLORIDE 1 MG: 1 INJECTION, SOLUTION INTRAMUSCULAR; INTRAVENOUS; SUBCUTANEOUS at 05:06

## 2017-06-09 RX ADMIN — ZOLPIDEM TARTRATE 5 MG: 5 TABLET, FILM COATED ORAL at 09:06

## 2017-06-09 NOTE — PROGRESS NOTES
[facility]  General Surgery  Progress Note    Subjective:     History of Present Illness:  No notes on file    Post-Op Info:  Procedure(s) (LRB):  SUBTOTAL PARATHYROIDECTOMY (N/A)   1 Day Post-Op     Interval History: pain controlled with medication, mild ha  Voice nrml  Wants to drink water    Medications:  Continuous Infusions:   sodium chloride 0.9% 70 mL/hr at 06/09/17 0513     Scheduled Meds:   PRN Meds:acetaminophen, HYDROmorphone, ondansetron, sodium chloride 0.9%, sodium chloride 0.9%     Review of patient's allergies indicates:  No Known Allergies  Objective:     Vital Signs (Most Recent):  Temp: 97.7 °F (36.5 °C) (06/09/17 0800)  Pulse: 78 (06/09/17 0800)  Resp: 16 (06/09/17 0800)  BP: (!) 148/79 (06/09/17 0800)  SpO2: (!) 94 % (06/09/17 0817) Vital Signs (24h Range):  Temp:  [97.7 °F (36.5 °C)-98.5 °F (36.9 °C)] 97.7 °F (36.5 °C)  Pulse:  [] 78  Resp:  [12-19] 16  SpO2:  [91 %-99 %] 94 %  BP: (146-168)/(74-94) 148/79     Weight: 96.2 kg (212 lb)  Body mass index is 29.57 kg/m².    Intake/Output - Last 3 Shifts       06/07 0700 - 06/08 0659 06/08 0700 - 06/09 0659 06/09 0700 - 06/10 0659    P.O.  60     I.V. (mL/kg)  2745.7 (28.5)     Total Intake(mL/kg)  2805.7 (29.2)     Urine (mL/kg/hr)  1700 (0.7) 450 (1.4)    Other  45 (0)     Stool  0 (0)     Total Output   1745 450    Net   +1060.7 -450           Stool Occurrence  0 x           Physical Exam   Inc c/d/i   sumanth serosang  45 output      Significant Labs:         Significant Diagnostics:  I have reviewed and interpreted all pertinent imaging results/findings within the past 24 hours.    Assessment/Plan:     * Primary hyperparathyroidism    3.5 glands removed, still with hyperactivity  Plan for repeat sestamibi spect ct  I discussed with pathology need for expedited review of permanent samples to confirm parathyroid tissue removed  We discussed return to operating room to remove final 0.5 gland and implantation of smaller  amt  We wont have information back until tomorrow, operative plan for Monday 7am            Diamante Masterson MD  General Surgery  [facility]

## 2017-06-09 NOTE — PLAN OF CARE
Problem: Patient Care Overview (Adult)  Goal: Plan of Care Review  Outcome: Ongoing (interventions implemented as appropriate)  Recommendation/Intervention:   1. Initiate Clear Liquid diet when medically acceptable.     Goals:   Diet will be started within 48 hours  Nutrition Goal Status: new

## 2017-06-09 NOTE — PLAN OF CARE
[place] met with pt and friend Ms. Deisy Larose -- [phone]   independent prior to admit   no hh, no dme   Future Appointments  Date Time Provider Department Center   6/20/2017 11:00 AM Diamante Masterson MD [facility]   6/22/2017 11:00 AM Marvin Chaudhari MD [facility]        06/09/17 1607   Discharge Assessment   Assessment Type Discharge Planning Assessment   Confirmed/corrected address and phone number on facesheet? Yes   Assessment information obtained from? Patient;Medical Record   Expected Length of Stay (days) 3   Communicated expected length of stay with patient/caregiver yes   Type of Healthcare Directive Received (no lw )   Prior to hospitilization cognitive status: Alert/Oriented   Prior to hospitalization functional status: Independent   Current cognitive status: Alert/Oriented   Current Functional Status: Independent   Arrived From home or self-care   Lives With other (see comments)  (friend Deisy Larose  [phone] )   Able to Return to Prior Arrangements yes   Is patient able to care for self after discharge? Yes   How many people do you have in your home that can help with your care after discharge? 1   Patient's perception of discharge disposition home or selfcare   Readmission Within The Last 30 Days no previous admission in last 30 days   Patient currently being followed by outpatient case management? No   Patient currently receives home health services? No   Does the patient currently use HME? No   Patient currently receives private duty nursing? No   Patient currently receives any other outside agency services? No   Equipment Currently Used at Home none   Do you have any problems affording any of your prescribed medications? No  ([facility] - able to buy )   Is the patient taking medications as prescribed? yes   Do you have any financial concerns preventing you from receiving the healthcare you need? No   Does the patient have transportation to healthcare appointments? Yes    Transportation Available family or friend will provide   On Dialysis? No   Does the patient receive services at the Coumadin Clinic? No   Are there any open cases? No   Discharge Plan A Home;Home with family   Discharge Plan B Home;Home with family;Home Health   Patient/Family In Agreement With Plan no

## 2017-06-09 NOTE — ASSESSMENT & PLAN NOTE
3.5 glands removed, still with hyperactivity  Plan for repeat sestamibi spect ct  I discussed with pathology need for expedited review of permanent samples to confirm parathyroid tissue removed  We discussed return to operating room to remove final 0.5 gland and implantation of smaller amt  We wont have information back until tomorrow, operative plan for Monday 7am
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
## 2017-06-09 NOTE — SUBJECTIVE & OBJECTIVE
Interval History: pain controlled with medication, mild ha  Voice nrml  Wants to drink water    Medications:  Continuous Infusions:   sodium chloride 0.9% 70 mL/hr at 06/09/17 0513     Scheduled Meds:   PRN Meds:acetaminophen, HYDROmorphone, ondansetron, sodium chloride 0.9%, sodium chloride 0.9%     Review of patient's allergies indicates:  No Known Allergies  Objective:     Vital Signs (Most Recent):  Temp: 97.7 °F (36.5 °C) (06/09/17 0800)  Pulse: 78 (06/09/17 0800)  Resp: 16 (06/09/17 0800)  BP: (!) 148/79 (06/09/17 0800)  SpO2: (!) 94 % (06/09/17 0817) Vital Signs (24h Range):  Temp:  [97.7 °F (36.5 °C)-98.5 °F (36.9 °C)] 97.7 °F (36.5 °C)  Pulse:  [] 78  Resp:  [12-19] 16  SpO2:  [91 %-99 %] 94 %  BP: (146-168)/(74-94) 148/79     Weight: 96.2 kg (212 lb)  Body mass index is 29.57 kg/m².    Intake/Output - Last 3 Shifts       06/07 0700 - 06/08 0659 06/08 0700 - 06/09 0659 06/09 0700 - 06/10 0659    P.O.  60     I.V. (mL/kg)  2745.7 (28.5)     Total Intake(mL/kg)  2805.7 (29.2)     Urine (mL/kg/hr)  1700 (0.7) 450 (1.4)    Other  45 (0)     Stool  0 (0)     Total Output   1745 450    Net   +1060.7 -450           Stool Occurrence  0 x           Physical Exam   Inc c/d/i   sumanth serosang  45 output      Significant Labs:         Significant Diagnostics:  I have reviewed and interpreted all pertinent imaging results/findings within the past 24 hours.

## 2017-06-09 NOTE — PLAN OF CARE
Problem: Patient Care Overview  Goal: Plan of Care Review  Outcome: Ongoing (interventions implemented as appropriate)  Patient remains free from falls, bed alarm in use. Medicated for pain with good results, remains NPO sips with sleeping pill last pm.

## 2017-06-09 NOTE — PLAN OF CARE
Problem: Patient Care Overview  Goal: Plan of Care Review  SpO2   93% on   2lpm NC. No apparent distress noted. Will continue to monitor.

## 2017-06-09 NOTE — PLAN OF CARE
Problem: Patient Care Overview  Goal: Plan of Care Review  Outcome: Ongoing (interventions implemented as appropriate)  Patient on documented O2, no respiratory distress noted. Will continue to monitor.

## 2017-06-10 VITALS
RESPIRATION RATE: 18 BRPM | HEART RATE: 95 BPM | BODY MASS INDEX: 29.69 KG/M2 | DIASTOLIC BLOOD PRESSURE: 86 MMHG | WEIGHT: 212.06 LBS | SYSTOLIC BLOOD PRESSURE: 176 MMHG | TEMPERATURE: 97 F | HEIGHT: 71 IN | OXYGEN SATURATION: 94 %

## 2017-06-10 LAB
25(OH)D3+25(OH)D2 SERPL-MCNC: 60 NG/ML
CA-I BLDV-SCNC: 1.37 MMOL/L
PTH-INTACT SERPL-MCNC: 154 PG/ML

## 2017-06-10 PROCEDURE — 82330 ASSAY OF CALCIUM: CPT

## 2017-06-10 PROCEDURE — 25000003 PHARM REV CODE 250: Performed by: SURGERY

## 2017-06-10 PROCEDURE — 36415 COLL VENOUS BLD VENIPUNCTURE: CPT

## 2017-06-10 PROCEDURE — 83970 ASSAY OF PARATHORMONE: CPT

## 2017-06-10 PROCEDURE — 94761 N-INVAS EAR/PLS OXIMETRY MLT: CPT

## 2017-06-10 PROCEDURE — 27000221 HC OXYGEN, UP TO 24 HOURS

## 2017-06-10 PROCEDURE — 63600175 PHARM REV CODE 636 W HCPCS: Performed by: SURGERY

## 2017-06-10 PROCEDURE — 82306 VITAMIN D 25 HYDROXY: CPT

## 2017-06-10 RX ORDER — OXYCODONE AND ACETAMINOPHEN 10; 325 MG/1; MG/1
1 TABLET ORAL EVERY 4 HOURS PRN
Status: DISCONTINUED | OUTPATIENT
Start: 2017-06-10 | End: 2017-06-10 | Stop reason: HOSPADM

## 2017-06-10 RX ORDER — OXYCODONE AND ACETAMINOPHEN 5; 325 MG/1; MG/1
1 TABLET ORAL EVERY 4 HOURS PRN
Status: DISCONTINUED | OUTPATIENT
Start: 2017-06-10 | End: 2017-06-10 | Stop reason: HOSPADM

## 2017-06-10 RX ORDER — ALPRAZOLAM 1 MG/1
1 TABLET ORAL ONCE
Status: COMPLETED | OUTPATIENT
Start: 2017-06-10 | End: 2017-06-10

## 2017-06-10 RX ORDER — HYDROCODONE BITARTRATE AND ACETAMINOPHEN 5; 325 MG/1; MG/1
1 TABLET ORAL EVERY 4 HOURS PRN
Status: DISCONTINUED | OUTPATIENT
Start: 2017-06-10 | End: 2017-06-10

## 2017-06-10 RX ORDER — OXYCODONE AND ACETAMINOPHEN 5; 325 MG/1; MG/1
TABLET ORAL
Qty: 50 TABLET | Refills: 0 | Status: SHIPPED | OUTPATIENT
Start: 2017-06-10 | End: 2017-07-19

## 2017-06-10 RX ORDER — HYDROCODONE BITARTRATE AND ACETAMINOPHEN 10; 325 MG/1; MG/1
1 TABLET ORAL EVERY 4 HOURS PRN
Status: DISCONTINUED | OUTPATIENT
Start: 2017-06-10 | End: 2017-06-10

## 2017-06-10 RX ADMIN — ALPRAZOLAM 1 MG: 1 TABLET ORAL at 12:06

## 2017-06-10 RX ADMIN — OXYCODONE HYDROCHLORIDE AND ACETAMINOPHEN 1 TABLET: 10; 325 TABLET ORAL at 09:06

## 2017-06-10 RX ADMIN — HYDROMORPHONE HYDROCHLORIDE 1 MG: 1 INJECTION, SOLUTION INTRAMUSCULAR; INTRAVENOUS; SUBCUTANEOUS at 03:06

## 2017-06-10 NOTE — DISCHARGE SUMMARY
[facility]  Discharge Note  Short Stay    Admit Date: 6/8/2017    Discharge Date and Time:06/10/2017      Attending Physician: Diamante Masterson MD     Discharge Provider: Diamante Masterson    Diagnoses:  Active Hospital Problems    Diagnosis  POA    *Primary hyperparathyroidism [E21.0]  Yes     Priority: 1 - High    Cirrhosis of liver without ascites [K74.60]  Yes     Priority: 2     Chronic hepatitis C [B18.2]  Yes     Priority: 2     Anxiety [F41.9]  Yes     Priority: 25 - Low    History of traumatic brain injury [Z87.820]  Not Applicable      Resolved Hospital Problems    Diagnosis Date Resolved POA   No resolved problems to display.       Discharged Condition: fair    Hospital Course: Patient was admitted for an outpatient procedure and tolerated the procedure well with no complications. Persistent parathyroid hormone elev, planning expedited review of pathology and potential re-intervention Monday    Final Diagnoses: Same as principal problem.    Disposition: Home or Self Care    Follow up/Patient Instructions:    Medications:  Reconciled Home Medications:   Current Discharge Medication List      START taking these medications    Details   oxycodone-acetaminophen (PERCOCET) 5-325 mg per tablet Take 1-2 tablets PO q4-6hours PRN pain  Qty: 50 tablet, Refills: 0         CONTINUE these medications which have NOT CHANGED    Details   alprazolam (XANAX) 1 MG tablet Take 1 mg by mouth 2 (two) times daily.      duloxetine (CYMBALTA) 20 MG capsule Take 10 mg by mouth once daily.      meloxicam (MOBIC) 7.5 MG tablet Take 7.5 mg by mouth once daily.      methocarbamol (ROBAXIN) 750 MG Tab Take 750 mg by mouth once daily.       triamcinolone acetonide 0.1% (KENALOG) 0.1 % cream APPLY TO AFFECTED AREA TWICE DAILY  Qty: 80 g, Refills: 1      zolpidem (AMBIEN) 10 mg Tab Take 5 mg by mouth nightly as needed.      ergocalciferol (ERGOCALCIFEROL) 50,000 unit Cap Take 1 capsule (50,000 Units total) by mouth every 7  days.  Qty: 12 capsule, Refills: 3    Associated Diagnoses: Vitamin D insufficiency; Primary hyperparathyroidism         STOP taking these medications       aspirin (ECOTRIN) 81 MG EC tablet Comments:   Reason for Stopping:         oxycodone-acetaminophen (PERCOCET)  mg per tablet Comments:   Reason for Stopping:         diclofenac (VOLTAREN) 75 MG EC tablet Comments:   Reason for Stopping:         tramadol (ULTRAM) 50 mg tablet Comments:   Reason for Stopping:             No discharge procedures on file.  Follow-up Information     Diamante Masterson MD On 6/20/2017.    Specialties:  Surgery, General Surgery  Why:  11:00 am     Contact information:  [address]  [address]  [facility] [address]  [phone]             Marvin Chaudhari MD On 6/22/2017.    Specialty:  Family Medicine  Why:  11;00 AM     Contact information:  [address] [facility] [address] [phone]                   Discharge Procedure Orders (must include Diet, Follow-up, Activity):  Diet as tolerated    Activity restricted,no driving no heavy lifting > 20lbs

## 2017-06-10 NOTE — NURSING
IV discontinued to rt. Hand. Tolerated well, gelco intact. Discharge instructions given and explained to patient and spouse , verbalized understanding. Prescription given to spouse. Waiting for transport to escort patient down for discharge.

## 2017-06-10 NOTE — DISCHARGE INSTRUCTIONS
Ok to shower 06/10/2017, use mild soap on incision, pat incision dry. No soaking bath or swimming until after follow up. Take your pain medication as needed. Take over the counter stool softener while taking pain medication to prevent constipation. If no bowel movement in 2 days take miralax twice a day. Ok for light activity (light housework, walking, stair climbing) no strenuous exercise until after follow up. No lifting greater than 20 pounds or 1 gallon of milk until after follow up. Please call my office if fever > 101.5, pain uncontrolled with oral medications, persistent nausea/vomiting/or diarrhea, redness or drainage from the incisions, or any other worrisome concerns.

## 2017-06-10 NOTE — OP NOTE
DATE OF PROCEDURE:  06/08/2017    PREOPERATIVE DIAGNOSIS:  Primary hyperparathyroidism.    POSTOPERATIVE DIAGNOSIS:  Primary hyperparathyroidism.    PROCEDURES PERFORMED:  Four-gland parathyroid exploration, subtotal   parathyroidectomy.    SURGEON:  Diamante Masterson M.D.    ASSISTANT:  Allie Moore M.D. and Rebeca.    ANESTHESIA:  General endotracheal.    PREP:  Chlorhexidine.    ESTIMATED BLOOD LOSS:  25 mL.    INDICATIONS:  The patient is a 57-year-old male who presents to the clinic with   primary hyperparathyroidism.  Preoperative studies did not localize a   hyperfunctioning adenoma, thus suggesting a 4-gland hyperplasia.  An exploration   was discussed with the patient and potential subtotal removal and potential   need for reimplantation.  The risks of surgery were discussed with the patient   including bleeding, infection, pain, scarring, wound complications, injury to   local structures, recurrence and potential need for further surgery.  The   patient demonstrated understanding of these risks and a consent form was   obtained.    PROCEDURE IN DETAIL:  The patient was identified in the Preoperative Unit and   taken back to the Operating Room, laid supine on the operating room table.  IV   antibiotics were administered prior to the induction of general anesthesia.    General anesthesia was induced without complication.  A shoulder roll was used   to position the patient with his neck in extension.  He was prepped and draped   in a standard sterile fashion.  A timeout procedure was performed in accordance   with hospital protocol.  A collar incision was made approximately 2   fingerbreadths above the sternal notch using a #15 blade scalpel.  The tissues   were dissected through the platysma with cautery device.  Platysmal flaps were   then raised superiorly and inferiorly above the thyroid cartilage and just below   the sternal notch.  Anterior jugular vein in the operative field was ligated   with a 2-0  silk suture x2.  The midline strap muscles were then divided and the   thyroid was mobilized laterally  the sternohyoid and sternothyroid   from the thyroid.  Superior and inferior poles of the thyroid were completely   mobilized.  The superior right parathyroid was identified in its normal   anatomical location superior and posterior to the superior lobe of the thyroid.    It appeared enlarged.  The right inferior was then identified in its normal   anatomical location in the space adjacent to the inferior lobe of the thyroid.    It also appeared enlarged.  The left superior parathyroid was identified in a   posterior location of the left superior pole of the thyroid.  It required   takedown of the superior pole of the thyroid using a LigaSure device in order to   reflect it medially.  The superior parathyroid on the left appeared enlarged.    The left inferior parathyroid was then identified in its normal anatomical   location adjacent to the inferior lobe of the thyroid and it also appeared   enlarged.  All 4 parathyroid glands were then mobilized and pedicles remained   intact.  Decision was made then to preserve the left inferior parathyroid given   its easily accessible location.  The remaining parathyroids were then divided at   their pedicle using the LigaSure device and sent to Pathology as permanent   section.  The left inferior parathyroid was then clipped and divided in half and   the specimen was sent to Pathology for further review.  At this point,   hemostasis was achieved and confirmed.  The left inferior parathyroid appeared   viable.  Some Surgicel and Avitene were placed into the operative field and   then, the midline structures were then reapproximated over a 7-Faroese   Sohail-Holt drain.  The drain was tunneled laterally at the incision.  The   incision was closed with 3-0 Vicryl suture in the platysmal layer and deep   dermal layer and a running Prolene suture at the subcuticular  layer.  That was   removed after Dermabond glue had dried on the skin.  The patient was awakened   from anesthesia without complication and returned to the Postop Recovery Unit in   stable condition.  At the end of the case, sponge, instrument and needle counts   were correct on 2 occasions.  I was present and scrubbed throughout the   entirely of the case.  The assistance of Dr. Allie Moore was necessary given   the necessity of a 4-gland exploration requiring assistance for complete   mobilization and identification of the 4 parathyroids.    COMPLICATIONS:  None.    CONDITION:  Stable.    SPECIMENS:  1.  Right superior parathyroid.  2.  Right inferior parathyroid.  3.  Left superior parathyroid.  4.  Partial left inferior parathyroid.      TOM/  dd: 06/10/2017 08:30:59 (CDT)  td: 06/10/2017 12:49:48 (CDT)  Doc ID   #[id number]  Job ID #[id number]    CC:

## 2017-06-10 NOTE — PLAN OF CARE
Problem: Patient Care Overview  Goal: Plan of Care Review  Sats 94% RA; tolerating well; will continue to monitor.

## 2017-06-10 NOTE — PLAN OF CARE
Problem: Patient Care Overview  Goal: Plan of Care Review  Outcome: Ongoing (interventions implemented as appropriate)  Pt is AAOx3. No complaints of nausea, vomiting, or diarrhea. Pt complains of neck pain and dilaudid given. Pt very anxious and seeks attention frequently. Xanax given. Light gi soft diet. Able to swallow pills and eat food with no issues. ROJAS drain the from anterior neck and draining. Tele [id number] and running sinus rhythm. Safety precautions maintained. Tolerated all medications well.

## 2017-06-10 NOTE — ASSESSMENT & PLAN NOTE
3.5 glands removed, still with hyperactivity  Repeat sestamibi not localizing  I discussed with pathology need for expedited review of permanent samples to confirm parathyroid tissue removed  We discussed return to operating room to remove final 0.5 gland and implantation of smaller amt   operative plan for Monday 7am  Will defer surgery based on path results given normalizing calciums

## 2017-06-10 NOTE — SUBJECTIVE & OBJECTIVE
Interval History:   Having issues with sleeping  C/o pain controlled with medication  Ca nrml this morning    Medications:  Continuous Infusions:   Scheduled Meds:   PRN Meds:acetaminophen, alprazolam, ondansetron, oxycodone-acetaminophen, oxycodone-acetaminophen, sodium chloride 0.9%, sodium chloride 0.9%, zolpidem     Review of patient's allergies indicates:  No Known Allergies  Objective:     Vital Signs (Most Recent):  Temp: 97 °F (36.1 °C) (06/10/17 0756)  Pulse: 95 (06/10/17 0756)  Resp: 18 (06/10/17 0756)  BP: (!) 176/86 (06/10/17 0756)  SpO2: (!) 94 % (06/10/17 0819) Vital Signs (24h Range):  Temp:  [97 °F (36.1 °C)-98.5 °F (36.9 °C)] 97 °F (36.1 °C)  Pulse:  [84-95] 95  Resp:  [16-18] 18  SpO2:  [91 %-97 %] 94 %  BP: (128-176)/(52-86) 176/86     Weight: 96.2 kg (212 lb 1.3 oz)  Body mass index is 29.58 kg/m².    Intake/Output - Last 3 Shifts       06/08 0700 - 06/09 0659 06/09 0700 - 06/10 0659 06/10 0700 - 06/11 0659    P.O. 60 980     I.V. (mL/kg) 2745.7 (28.5) 964.8 (10)     Total Intake(mL/kg) 2805.7 (29.2) 1944.8 (20.2)     Urine (mL/kg/hr) 1700 (0.7) 2720 (1.2)     Other 45 (0) 50 (0)     Stool 0 (0) 0 (0)     Total Output 1745 2770      Net +1060.7 -825.2             Stool Occurrence 0 x 0 x           Physical Exam   Neck:   Inc c /d/i no swelling  sumanth serosang   Pulmonary/Chest: No stridor.   Vitals reviewed.      Significant Labs:  ca 1.35    Significant Diagnostics:  I have reviewed all pertinent imaging results/findings within the past 24 hours.  discussed with radiologist, soft interpretation of r  sup para activity

## 2017-06-10 NOTE — PLAN OF CARE
Discharge orders noted, no HH or HME ordered.    Future Appointments  Date Time Provider Department Center   6/20/2017 11:00 AM Diamante Masterson MD [facility]   6/22/2017 11:00 AM Marvin Chaudhari MD [facility]          06/10/17 0928   Final Note   Assessment Type Final Discharge Note   Discharge Disposition Home   What phone number can be called within the next 1-3 days to see how you are doing after discharge? [phone]   Hospital Follow Up  Appt(s) scheduled? Yes   Offered [facility] -- Bedside Delivery? n/a   Discharge/Hospital Encounter Summary to (non-[facility]) PCP Yes   Referral to / orders for Home Health Complete? n/a   30 day supply of medicines given at discharge, if documented non-compliance / non-adherence? n/a   Any social issues identified prior to discharge? n/a   Did you assess the readiness or willingness of the family or caregiver to support self management of care? n/a   Right Care Referral Info   Post Acute Recommendation No Care     Ruby Gill RN Transitional Navigator  [phone]

## 2017-06-10 NOTE — PROGRESS NOTES
[facility]  General Surgery  Progress Note    Subjective:     History of Present Illness:  No notes on file    Post-Op Info:  Procedure(s) (LRB):  SUBTOTAL PARATHYROIDECTOMY (N/A)   2 Days Post-Op     Interval History:   Having issues with sleeping  C/o pain controlled with medication  Ca nrml this morning    Medications:  Continuous Infusions:   Scheduled Meds:   PRN Meds:acetaminophen, alprazolam, ondansetron, oxycodone-acetaminophen, oxycodone-acetaminophen, sodium chloride 0.9%, sodium chloride 0.9%, zolpidem     Review of patient's allergies indicates:  No Known Allergies  Objective:     Vital Signs (Most Recent):  Temp: 97 °F (36.1 °C) (06/10/17 0756)  Pulse: 95 (06/10/17 0756)  Resp: 18 (06/10/17 0756)  BP: (!) 176/86 (06/10/17 0756)  SpO2: (!) 94 % (06/10/17 0819) Vital Signs (24h Range):  Temp:  [97 °F (36.1 °C)-98.5 °F (36.9 °C)] 97 °F (36.1 °C)  Pulse:  [84-95] 95  Resp:  [16-18] 18  SpO2:  [91 %-97 %] 94 %  BP: (128-176)/(52-86) 176/86     Weight: 96.2 kg (212 lb 1.3 oz)  Body mass index is 29.58 kg/m².    Intake/Output - Last 3 Shifts       06/08 0700 - 06/09 0659 06/09 0700 - 06/10 0659 06/10 0700 - 06/11 0659    P.O. 60 980     I.V. (mL/kg) 2745.7 (28.5) 964.8 (10)     Total Intake(mL/kg) 2805.7 (29.2) 1944.8 (20.2)     Urine (mL/kg/hr) 1700 (0.7) 2720 (1.2)     Other 45 (0) 50 (0)     Stool 0 (0) 0 (0)     Total Output 1745 2770      Net +1060.7 -825.2             Stool Occurrence 0 x 0 x           Physical Exam   Neck:   Inc c /d/i no swelling  sumanth serosang   Pulmonary/Chest: No stridor.   Vitals reviewed.      Significant Labs:  ca 1.35    Significant Diagnostics:  I have reviewed all pertinent imaging results/findings within the past 24 hours.  discussed with radiologist, soft interpretation of r  sup para activity    Assessment/Plan:     * Primary hyperparathyroidism    3.5 glands removed, still with hyperactivity  Repeat sestamibi not localizing  I discussed with pathology need  for expedited review of permanent samples to confirm parathyroid tissue removed  We discussed return to operating room to remove final 0.5 gland and implantation of smaller amt   operative plan for Monday 7am  Will defer surgery based on path results given normalizing calciums            Diamante Masterson MD  General Surgery  [facility]

## 2017-06-11 ENCOUNTER — TELEPHONE (OUTPATIENT)
Dept: SURGERY | Facility: HOSPITAL | Age: 57
End: 2017-06-11

## 2017-06-11 NOTE — TELEPHONE ENCOUNTER
I spoke with patient's partner, Deisy, regarding updating pathology report. 3 glands remain in patient explaining persistent hyperparathyroidism. I discussed need for reoperation and plan for surgery at [facility] katja shabazz in consultation with Dr. Pedraza. She reported that patient was asleep and she would discuss this with him and call if there were any questions. She also said he didn't feel well enough for surgery on Monday. We will plan for reoperation on Thursday. I offerred to talk with family members who had many questions.

## 2017-06-12 ENCOUNTER — TELEPHONE (OUTPATIENT)
Dept: SURGERY | Facility: CLINIC | Age: 57
End: 2017-06-12

## 2017-06-12 NOTE — TELEPHONE ENCOUNTER
I spoke to Ms. Olivas again today to offer clinic appointment tomorrow to discuss operative plan. Patient was sleeping and she asked me to speak with his sister, Bella. I spoke with Mr. Mendoza's sister and updated her on current state. I suggested that she come to clinic appointment tomorrow to further discuss.

## 2017-06-12 NOTE — TELEPHONE ENCOUNTER
----- Message from Martha Luis sent at 6/12/2017  2:18 PM CDT -----  Contact: Caroline(wife)/[phone]  Patient's wife is calling to speak with you about his procedure on 6/8/17.  Please advise    06/12/17   7740  Spoke to Deisy regarding scheduling a clinic visit per Dr. Masterson request. Appt made for 06/12/17 at 11:20am, but Deisy states she may need to call and reschedule appt due to pt feeling very tired and weak.

## 2017-06-12 NOTE — TELEPHONE ENCOUNTER
06/12/17    1045  Contacted pt regarding post op assessment. Pain level: 0/10. Pain medication effective. Taking stool softeners, but states no bm since 06/07/17 prior to surgery. Pt states he has to take Ex Lax in order to have a bm, and that he's been taking Ex Lax for years, and can't have a bm withouth it.  Denies fever. Post op appt: 06/20/17 at 11am. Pt instructed to call if any issues or concerns arise prior to post op appt. Pt verbalized understanding.

## 2017-06-21 ENCOUNTER — OFFICE VISIT (OUTPATIENT)
Dept: SURGERY | Facility: CLINIC | Age: 57
End: 2017-06-21
Payer: MEDICARE

## 2017-06-21 VITALS
SYSTOLIC BLOOD PRESSURE: 123 MMHG | BODY MASS INDEX: 27.93 KG/M2 | HEART RATE: 72 BPM | DIASTOLIC BLOOD PRESSURE: 79 MMHG | HEIGHT: 71 IN | TEMPERATURE: 98 F | WEIGHT: 199.5 LBS

## 2017-06-21 DIAGNOSIS — E21.0 PRIMARY HYPERPARATHYROIDISM: Primary | ICD-10-CM

## 2017-06-21 PROCEDURE — 99024 POSTOP FOLLOW-UP VISIT: CPT | Mod: S$GLB,,, | Performed by: SURGERY

## 2017-06-21 PROCEDURE — 99999 PR PBB SHADOW E&M-EST. PATIENT-LVL III: CPT | Mod: PBBFAC,,, | Performed by: SURGERY

## 2017-06-21 PROCEDURE — 99499 UNLISTED E&M SERVICE: CPT | Mod: S$GLB,,, | Performed by: SURGERY

## 2017-06-27 NOTE — PROGRESS NOTES
Patient presents after parathyroid exploration, postop recovery uneventful however increasing somnolence and confusion likely related to hepatic clearance of anesthetic.  We had persistent hyperparathyroidism and normocalcemia postoperatively, expedited Path reviewed and residual parathyroid tissue present.  Pathology was discussed with the patient.  At the time reoperation was recommended within a week of surgery to further explore and remove parathyroid glands with least amount of risk however patient was unable to tolerate surgical procedure and chose to defer.  He would like to discuss medical management versus reoperation in 6 months.  He's had no issues with voice or  paresthesias.  His incision is clean dry and intact.    He will follow-up in 1 month, I will discuss Sensipar with his endocrinologist.

## 2017-07-19 ENCOUNTER — OFFICE VISIT (OUTPATIENT)
Dept: SURGERY | Facility: CLINIC | Age: 57
End: 2017-07-19
Payer: MEDICARE

## 2017-07-19 VITALS
BODY MASS INDEX: 28.38 KG/M2 | WEIGHT: 202.69 LBS | TEMPERATURE: 98 F | HEIGHT: 71 IN | DIASTOLIC BLOOD PRESSURE: 76 MMHG | SYSTOLIC BLOOD PRESSURE: 113 MMHG | HEART RATE: 100 BPM

## 2017-07-19 DIAGNOSIS — E21.0 PRIMARY HYPERPARATHYROIDISM: Primary | ICD-10-CM

## 2017-07-19 PROCEDURE — 99499 UNLISTED E&M SERVICE: CPT | Mod: S$GLB,,, | Performed by: SURGERY

## 2017-07-19 PROCEDURE — 99999 PR PBB SHADOW E&M-EST. PATIENT-LVL III: CPT | Mod: PBBFAC,,, | Performed by: SURGERY

## 2017-07-19 PROCEDURE — 99024 POSTOP FOLLOW-UP VISIT: CPT | Mod: S$GLB,,, | Performed by: SURGERY

## 2017-07-19 NOTE — PROGRESS NOTES
Patient presents after parathyroid exploration, postop recovery uneventful however increasing somnolence and confusion likely related to hepatic clearance of anesthetic.  We had persistent hyperparathyroidism and normocalcemia postoperatively, expedited Path reviewed and residual parathyroid tissue present.  Pathology was discussed with the patient.  At the time reoperation was recommended within a week of surgery to further explore and remove parathyroid glands with least amount of risk however patient was unable to tolerate surgical procedure and chose to defer.  He would like to discuss medical management versus reoperation in 6 months.  He's had no issues with voice or  paresthesias.  His incision is clean dry and intact.    He will follow-up in 3 months   And return to follow up with endocrinology, case discussed with

## 2017-12-08 RX ORDER — TRIAMCINOLONE ACETONIDE 1 MG/G
CREAM TOPICAL
Qty: 80 G | Refills: 0 | Status: SHIPPED | OUTPATIENT
Start: 2017-12-08 | End: 2018-12-13 | Stop reason: SDUPTHER

## 2018-01-22 ENCOUNTER — PES CALL (OUTPATIENT)
Dept: ADMINISTRATIVE | Facility: CLINIC | Age: 58
End: 2018-01-22

## 2018-03-26 ENCOUNTER — PES CALL (OUTPATIENT)
Dept: ADMINISTRATIVE | Facility: CLINIC | Age: 58
End: 2018-03-26

## 2018-03-26 DIAGNOSIS — E21.0 PRIMARY HYPERPARATHYROIDISM: ICD-10-CM

## 2018-03-26 DIAGNOSIS — E55.9 VITAMIN D INSUFFICIENCY: ICD-10-CM

## 2018-03-26 RX ORDER — ERGOCALCIFEROL 1.25 MG/1
CAPSULE ORAL
Qty: 12 CAPSULE | Refills: 0 | OUTPATIENT
Start: 2018-03-26

## 2018-04-25 ENCOUNTER — TELEPHONE (OUTPATIENT)
Dept: HEPATOLOGY | Facility: CLINIC | Age: 58
End: 2018-04-25

## 2018-04-25 ENCOUNTER — OFFICE VISIT (OUTPATIENT)
Dept: SURGERY | Facility: CLINIC | Age: 58
End: 2018-04-25
Payer: MEDICARE

## 2018-04-25 VITALS
HEIGHT: 71 IN | HEART RATE: 85 BPM | SYSTOLIC BLOOD PRESSURE: 134 MMHG | BODY MASS INDEX: 28.61 KG/M2 | DIASTOLIC BLOOD PRESSURE: 89 MMHG | TEMPERATURE: 98 F | WEIGHT: 204.38 LBS

## 2018-04-25 DIAGNOSIS — K80.20 CALCULUS OF GALLBLADDER WITHOUT CHOLECYSTITIS WITHOUT OBSTRUCTION: Primary | ICD-10-CM

## 2018-04-25 DIAGNOSIS — K74.60 HEPATIC CIRRHOSIS, UNSPECIFIED HEPATIC CIRRHOSIS TYPE, UNSPECIFIED WHETHER ASCITES PRESENT: Primary | ICD-10-CM

## 2018-04-25 PROCEDURE — 99214 OFFICE O/P EST MOD 30 MIN: CPT | Mod: S$GLB,,, | Performed by: SURGERY

## 2018-04-25 PROCEDURE — 99999 PR PBB SHADOW E&M-EST. PATIENT-LVL III: CPT | Mod: PBBFAC,,, | Performed by: SURGERY

## 2018-04-25 RX ORDER — OXYCODONE AND ACETAMINOPHEN 10; 325 MG/1; MG/1
1 TABLET ORAL EVERY 4 HOURS PRN
COMMUNITY
End: 2019-08-01

## 2018-04-25 NOTE — TELEPHONE ENCOUNTER
Attempt made to reach patient.  Message from PA Scheuermann left on his VM and mailed to him.  I stressed that he call us for scheduling.

## 2018-04-25 NOTE — TELEPHONE ENCOUNTER
Received msg from Dr Masterson that pt returned to her w/ c/o right sided abd pain that does not sound biliary. She would like him seen here again    Pt overdue for f/u in our clinic    pls schedule CBC, CMP, INR, AFP, U/S ABDOMEN, VISIT  thanks
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
## 2018-05-05 DIAGNOSIS — E55.9 VITAMIN D INSUFFICIENCY: ICD-10-CM

## 2018-05-07 RX ORDER — ERGOCALCIFEROL 1.25 MG/1
CAPSULE ORAL
Qty: 6 CAPSULE | Refills: 0 | OUTPATIENT
Start: 2018-05-07

## 2018-05-08 ENCOUNTER — HOSPITAL ENCOUNTER (OUTPATIENT)
Dept: RADIOLOGY | Facility: HOSPITAL | Age: 58
Discharge: HOME | End: 2018-05-08
Attending: PHYSICIAN ASSISTANT
Payer: MEDICARE

## 2018-05-08 DIAGNOSIS — K74.60 HEPATIC CIRRHOSIS, UNSPECIFIED HEPATIC CIRRHOSIS TYPE, UNSPECIFIED WHETHER ASCITES PRESENT: ICD-10-CM

## 2018-05-08 PROCEDURE — 76700 US EXAM ABDOM COMPLETE: CPT | Mod: TC

## 2018-05-08 PROCEDURE — 76700 US EXAM ABDOM COMPLETE: CPT | Mod: 26,,, | Performed by: RADIOLOGY

## 2018-05-09 ENCOUNTER — TELEPHONE (OUTPATIENT)
Dept: SURGERY | Facility: CLINIC | Age: 58
End: 2018-05-09

## 2018-05-09 NOTE — TELEPHONE ENCOUNTER
----- Message from Nate Brooks sent at 5/9/2018 10:31 AM CDT -----  Contact: [phone] self  Patient would like refill of ergocalciferol (ERGOCALCIFEROL) 50,000 unit Cap sent to [facility] [id number]. Please advise.        05/09/2018       1644  Contacted pt's wife regarding the above message. Informed her that this medication would have to be refilled by the physician who originally wrote the script. She was given the physician's name and phone number to the office of where she /he is located. Pt's wife verbalized understanding.

## 2018-05-10 DIAGNOSIS — E55.9 VITAMIN D INSUFFICIENCY: ICD-10-CM

## 2018-05-10 DIAGNOSIS — E21.0 PRIMARY HYPERPARATHYROIDISM: ICD-10-CM

## 2018-05-10 RX ORDER — ERGOCALCIFEROL 1.25 MG/1
50000 CAPSULE ORAL
Qty: 12 CAPSULE | Refills: 0 | Status: SHIPPED | OUTPATIENT
Start: 2018-05-10 | End: 2019-07-26 | Stop reason: SDDI

## 2018-05-10 NOTE — TELEPHONE ENCOUNTER
----- Message from Gina Cowart sent at 5/10/2018 10:58 AM CDT -----  Contact: Pt's Partner Caroline Larose   Pt's Partner Caroline Larose called to speak to the nurse regarding the pt's care and to request and rx for vitamin D for the pt and would like a call back. Ms Larose would like the rx sent to [facility] on [address] and [address].    Ms Larose can be reached at [phone].    Thanks

## 2018-05-10 NOTE — TELEPHONE ENCOUNTER
Talk to pt wife  she agreed June 13 at 1:30pm for f/u with deisy. Gave her clinic number if she have any question or cancel

## 2018-05-16 ENCOUNTER — LAB VISIT (OUTPATIENT)
Dept: LAB | Facility: HOSPITAL | Age: 58
End: 2018-05-16
Payer: MEDICARE

## 2018-05-16 ENCOUNTER — OFFICE VISIT (OUTPATIENT)
Dept: HEPATOLOGY | Facility: CLINIC | Age: 58
End: 2018-05-16
Payer: MEDICARE

## 2018-05-16 VITALS
SYSTOLIC BLOOD PRESSURE: 121 MMHG | HEART RATE: 76 BPM | HEIGHT: 71 IN | WEIGHT: 205 LBS | BODY MASS INDEX: 28.7 KG/M2 | OXYGEN SATURATION: 98 % | RESPIRATION RATE: 18 BRPM | DIASTOLIC BLOOD PRESSURE: 71 MMHG | TEMPERATURE: 97 F

## 2018-05-16 DIAGNOSIS — Z11.4 ENCOUNTER FOR SCREENING FOR HIV: ICD-10-CM

## 2018-05-16 DIAGNOSIS — K74.60 CIRRHOSIS OF LIVER WITHOUT ASCITES, UNSPECIFIED HEPATIC CIRRHOSIS TYPE: Primary | ICD-10-CM

## 2018-05-16 DIAGNOSIS — K74.60 CIRRHOSIS OF LIVER WITHOUT ASCITES, UNSPECIFIED HEPATIC CIRRHOSIS TYPE: ICD-10-CM

## 2018-05-16 DIAGNOSIS — B18.2 CHRONIC HEPATITIS C WITHOUT HEPATIC COMA: ICD-10-CM

## 2018-05-16 PROCEDURE — 86706 HEP B SURFACE ANTIBODY: CPT

## 2018-05-16 PROCEDURE — 99499 UNLISTED E&M SERVICE: CPT | Mod: S$GLB,,, | Performed by: PHYSICIAN ASSISTANT

## 2018-05-16 PROCEDURE — 86703 HIV-1/HIV-2 1 RESULT ANTBDY: CPT

## 2018-05-16 PROCEDURE — 3008F BODY MASS INDEX DOCD: CPT | Mod: CPTII,S$GLB,, | Performed by: PHYSICIAN ASSISTANT

## 2018-05-16 PROCEDURE — 36415 COLL VENOUS BLD VENIPUNCTURE: CPT

## 2018-05-16 PROCEDURE — 87340 HEPATITIS B SURFACE AG IA: CPT

## 2018-05-16 PROCEDURE — 86704 HEP B CORE ANTIBODY TOTAL: CPT

## 2018-05-16 PROCEDURE — 99999 PR PBB SHADOW E&M-EST. PATIENT-LVL IV: CPT | Mod: PBBFAC,,, | Performed by: PHYSICIAN ASSISTANT

## 2018-05-16 PROCEDURE — 86790 VIRUS ANTIBODY NOS: CPT

## 2018-05-16 PROCEDURE — 99214 OFFICE O/P EST MOD 30 MIN: CPT | Mod: S$GLB,,, | Performed by: PHYSICIAN ASSISTANT

## 2018-05-16 RX ORDER — PAROXETINE HYDROCHLORIDE 40 MG/1
40 TABLET, FILM COATED ORAL EVERY MORNING
COMMUNITY
End: 2019-10-21 | Stop reason: HOSPADM

## 2018-05-16 NOTE — Clinical Note
I saw this pt in clinic today. He needs an EGD for varices screening and would prefer [facility] if possible. I entered an EGD under your name. Please let me know if there's anything else I can do! Thanks so much!  Dafne

## 2018-05-16 NOTE — PATIENT INSTRUCTIONS
Call me if no one has called you to set up the upper scope (EGD) in a week - [phone]  Labs today  Follow up visit with me to discuss HCV treatment after EGD
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
## 2018-05-17 ENCOUNTER — TELEPHONE (OUTPATIENT)
Dept: GASTROENTEROLOGY | Facility: CLINIC | Age: 58
End: 2018-05-17

## 2018-05-17 LAB
HBV CORE AB SERPL QL IA: NEGATIVE
HBV SURFACE AB SER-ACNC: NEGATIVE M[IU]/ML
HBV SURFACE AG SERPL QL IA: NEGATIVE
HEPATITIS A ANTIBODY, IGG: POSITIVE
HIV 1+2 AB+HIV1 P24 AG SERPL QL IA: NEGATIVE

## 2018-05-17 NOTE — TELEPHONE ENCOUNTER
----- Message from Jennifer B. Scheuermann, PA sent at 5/16/2018  5:26 PM CDT -----  I saw this pt in clinic today. He needs an EGD for varices screening and would prefer [facility] if possible. I entered an EGD under your name. Please let me know if there's anything else I can do! Thanks so much!  Dafne

## 2018-05-17 NOTE — TELEPHONE ENCOUNTER
Patient scheduled for  ENDOSCOPY with Dr. Carrillo on 05/29/2018. NPO instructions discussed and mailed to patient.  Lab will call two days prior to procedure date with an arrival time.  May not drive for 24 hours post procedure. Patient verbalized understanding.

## 2018-05-29 ENCOUNTER — HOSPITAL ENCOUNTER (OUTPATIENT)
Facility: HOSPITAL | Age: 58
Discharge: HOME | End: 2018-05-29
Attending: INTERNAL MEDICINE | Admitting: INTERNAL MEDICINE
Payer: MEDICARE

## 2018-05-29 ENCOUNTER — ANESTHESIA EVENT (OUTPATIENT)
Dept: ENDOSCOPY | Facility: HOSPITAL | Age: 58
End: 2018-05-29
Payer: MEDICARE

## 2018-05-29 ENCOUNTER — SURGERY (OUTPATIENT)
Age: 58
End: 2018-05-29

## 2018-05-29 ENCOUNTER — ANESTHESIA (OUTPATIENT)
Dept: ENDOSCOPY | Facility: HOSPITAL | Age: 58
End: 2018-05-29
Payer: MEDICARE

## 2018-05-29 DIAGNOSIS — K74.60 CIRRHOSIS: Primary | ICD-10-CM

## 2018-05-29 PROCEDURE — 37000009 HC ANESTHESIA EA ADD 15 MINS: Performed by: INTERNAL MEDICINE

## 2018-05-29 PROCEDURE — 63600175 PHARM REV CODE 636 W HCPCS: Performed by: NURSE ANESTHETIST, CERTIFIED REGISTERED

## 2018-05-29 PROCEDURE — 43235 EGD DIAGNOSTIC BRUSH WASH: CPT | Performed by: INTERNAL MEDICINE

## 2018-05-29 PROCEDURE — 43235 EGD DIAGNOSTIC BRUSH WASH: CPT | Mod: ,,, | Performed by: INTERNAL MEDICINE

## 2018-05-29 PROCEDURE — 37000008 HC ANESTHESIA 1ST 15 MINUTES: Performed by: INTERNAL MEDICINE

## 2018-05-29 RX ORDER — LIDOCAINE HCL/PF 100 MG/5ML
SYRINGE (ML) INTRAVENOUS
Status: DISCONTINUED | OUTPATIENT
Start: 2018-05-29 | End: 2018-05-29

## 2018-05-29 RX ORDER — PROPOFOL 10 MG/ML
VIAL (ML) INTRAVENOUS
Status: DISCONTINUED | OUTPATIENT
Start: 2018-05-29 | End: 2018-05-29

## 2018-05-29 RX ORDER — SODIUM CHLORIDE 9 MG/ML
INJECTION, SOLUTION INTRAVENOUS CONTINUOUS
Status: DISCONTINUED | OUTPATIENT
Start: 2018-05-29 | End: 2018-05-29 | Stop reason: HOSPADM

## 2018-05-29 RX ORDER — LIDOCAINE HYDROCHLORIDE 10 MG/ML
1 INJECTION, SOLUTION EPIDURAL; INFILTRATION; INTRACAUDAL; PERINEURAL ONCE
Status: DISCONTINUED | OUTPATIENT
Start: 2018-05-29 | End: 2018-05-29 | Stop reason: HOSPADM

## 2018-05-29 RX ADMIN — PROPOFOL 80 MG: 10 INJECTION, EMULSION INTRAVENOUS at 03:05

## 2018-05-29 RX ADMIN — PROPOFOL 20 MG: 10 INJECTION, EMULSION INTRAVENOUS at 03:05

## 2018-05-29 RX ADMIN — LIDOCAINE HYDROCHLORIDE 100 MG: 20 INJECTION, SOLUTION INTRAVENOUS at 03:05

## 2018-05-29 NOTE — TRANSFER OF CARE
"Anesthesia Transfer of Care Note    Patient: Lucio Mendoza    Procedure(s) Performed: Procedure(s) (LRB):  ESOPHAGOGASTRODUODENOSCOPY (EGD) - varices screen (Left)    Patient location: GI    Anesthesia Type: MAC    Transport from OR: Transported from OR on room air with adequate spontaneous ventilation    Post pain: adequate analgesia    Post assessment: no apparent anesthetic complications and tolerated procedure well    Post vital signs: stable    Level of consciousness: awake, alert and oriented    Nausea/Vomiting: no nausea/vomiting    Complications: none    Transfer of care protocol was followed      Last vitals:   Visit Vitals  /73   Pulse 66   Temp 37 °C (98.6 °F) (Oral)   Resp 18   Ht 5' 11" (1.803 m)   Wt 90.7 kg (200 lb)   SpO2 96%   BMI 27.89 kg/m²     "
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
## 2018-05-29 NOTE — ANESTHESIA POSTPROCEDURE EVALUATION
"Anesthesia Post Evaluation    Patient: Lucio Mendoza    Procedure(s) Performed: Procedure(s) (LRB):  ESOPHAGOGASTRODUODENOSCOPY (EGD) - varices screen (Left)    Final Anesthesia Type: MAC  Patient location during evaluation: GI PACU  Patient participation: Yes- Able to Participate  Level of consciousness: awake and alert and oriented  Post-procedure vital signs: reviewed and stable  Pain management: adequate  Airway patency: patent  PONV status at discharge: No PONV  Anesthetic complications: no      Cardiovascular status: blood pressure returned to baseline, hemodynamically stable and stable  Respiratory status: unassisted, spontaneous ventilation and room air  Hydration status: euvolemic  Follow-up not needed.        Visit Vitals  /73   Pulse 66   Temp 37 °C (98.6 °F) (Oral)   Resp 18   Ht 5' 11" (1.803 m)   Wt 90.7 kg (200 lb)   SpO2 96%   BMI 27.89 kg/m²       Pain/Radha Score: No Data Recorded      "

## 2018-05-29 NOTE — PROVATION PATIENT INSTRUCTIONS
Discharge Summary/Instructions after an Endoscopic Procedure  Patient Name: Lucio Mendoza  Patient MRN: [id number]  Patient YOB: 1960  Tuesday, May 29, 2018  Lilo Carrillo MD  RESTRICTIONS:  During your procedure today, you received medications for sedation.  These   medications may affect your judgment, balance and coordination.  Therefore,   for 24 hours, you have the following restrictions:   - DO NOT drive a car, operate machinery, make legal/financial decisions,   sign important papers or drink alcohol.    ACTIVITY:  The following day: return to full activity including work, except no heavy   lifting, straining or running for 3 days if polyps were removed.  DIET:  Eat and drink normally unless instructed otherwise.     TREATMENT FOR COMMON SIDE EFFECTS:  - Mild abdominal pain, nausea, belching, bloating or excessive gas:  rest,   eat lightly and use a heating pad.  - Sore Throat: treat with throat lozenges and/or gargle with warm salt   water.  - Because air was used during the procedure, expelling large amounts of air   from your rectum or belching is normal.  - If a bowel prep was taken, you may not have a bowel movement for 1-3 days.    This is normal.  SYMPTOMS TO WATCH FOR AND REPORT TO YOUR PHYSICIAN:  1. Abdominal pain or bloating, other than gas cramps.  2. Chest pain.  3. Back pain.  4. Signs of infection such as: chills or fever occurring within 24 hours   after the procedure.  5. Rectal bleeding, which would show as bright red, maroon, or black stools.   (A tablespoon of blood from the rectum is not serious, especially if   hemorrhoids are present.)  6. Vomiting.  7. Weakness or dizziness.  GO DIRECTLY TO THE NEAREST EMERGENCY ROOM IF YOU HAVE ANY OF THE FOLLOWING:      Difficulty breathing              Chills and/or fever over 101 F   Persistent vomiting and/or vomiting blood   Severe abdominal pain   Severe chest pain   Black, tarry stools   Bleeding- more than one tablespoon   Any  other symptom or condition that you feel may need urgent attention  Your doctor recommends these additional instructions:  If any biopsies were taken, your doctors clinic will contact you in 1 to 2   weeks with any results.  - Discharge patient to home (via wheelchair).   - Patient has a contact number available for emergencies.  The signs and   symptoms of potential delayed complications were discussed with the   patient.  Return to normal activities tomorrow.  Written discharge   instructions were provided to the patient.   - Resume previous diet.   - Continue present medications.   - Repeat upper endoscopy in 2 years for surveillance.  For questions, problems or results please call your physician - Lilo Carrillo MD at Work:  [phone].  EMERGENCY PHONE NUMBER: [phone],  LAB RESULTS: [phone]  IF A COMPLICATION OR EMERGENCY SITUATION ARISES AND YOU ARE UNABLE TO REACH   YOUR PHYSICIAN - GO DIRECTLY TO THE EMERGENCY ROOM.  Lilo Carrillo MD  5/29/2018 4:28:27 PM  This report has been verified and signed electronically.  [employer]OVATION

## 2018-05-31 VITALS
DIASTOLIC BLOOD PRESSURE: 75 MMHG | BODY MASS INDEX: 28 KG/M2 | HEIGHT: 71 IN | OXYGEN SATURATION: 98 % | WEIGHT: 200 LBS | SYSTOLIC BLOOD PRESSURE: 123 MMHG | HEART RATE: 59 BPM | TEMPERATURE: 98 F | RESPIRATION RATE: 18 BRPM

## 2018-06-13 ENCOUNTER — OFFICE VISIT (OUTPATIENT)
Dept: ENDOCRINOLOGY | Facility: CLINIC | Age: 58
End: 2018-06-13
Payer: MEDICARE

## 2018-06-13 VITALS
HEART RATE: 70 BPM | BODY MASS INDEX: 28.83 KG/M2 | SYSTOLIC BLOOD PRESSURE: 122 MMHG | WEIGHT: 205.94 LBS | DIASTOLIC BLOOD PRESSURE: 79 MMHG | HEIGHT: 71 IN

## 2018-06-13 DIAGNOSIS — E21.0 PRIMARY HYPERPARATHYROIDISM: Primary | ICD-10-CM

## 2018-06-13 DIAGNOSIS — E83.52 HYPERCALCEMIA: ICD-10-CM

## 2018-06-13 PROCEDURE — 99214 OFFICE O/P EST MOD 30 MIN: CPT | Mod: S$GLB,,, | Performed by: INTERNAL MEDICINE

## 2018-06-13 PROCEDURE — 99999 PR PBB SHADOW E&M-EST. PATIENT-LVL III: CPT | Mod: PBBFAC,,, | Performed by: INTERNAL MEDICINE

## 2018-06-13 PROCEDURE — 3008F BODY MASS INDEX DOCD: CPT | Mod: CPTII,S$GLB,, | Performed by: INTERNAL MEDICINE

## 2018-06-13 RX ORDER — CINACALCET 30 MG/1
30 TABLET, FILM COATED ORAL
Qty: 30 TABLET | Refills: 6 | Status: SHIPPED | OUTPATIENT
Start: 2018-06-13 | End: 2019-07-26 | Stop reason: SDUPTHER

## 2018-06-13 NOTE — PROGRESS NOTES
Subjective:       Patient ID: Juan Smallwood is a 58 y.o. male.    Chief Complaint: Hyperparathyroidism    HPI   Mr. Smallwood returns to clinic today for Primary hyperparathyroidism follow up   At our last visit in 04/2017 , we recommend parathyroid surgery     He underwent 4 gland parathyroid exploration in June 2017 by Dr. Masterson   The patient had persistent hyperparathyroidism and normocalcemia postoperatively   At the time reoperation was recommended but the patient opted to defer     He is here today to discuss medical management     Most recent calcium is 11.2 mg/dL ( corrected ) from 05/2018     Of note, he was found to have elevated serum calcium since 2014     Parathyroid work up included:   NM parathyroid scan, which did not show scintigraph evidence of parathyroid adenoma in the neck or mediastinum   Thyroid ultrasound showed 2 areas adjacent to right and left thyroid lobes which could possibly represent a parathyroid adenoma   BMD from 03/2017 noted mild Osteopenia of the femoral neck . FRAX calculation does not support treatment for Osteoporosis      He denies kidney stones   Denies chronic kidney disease   Denies personal hx of falls or fractures   Denies constipation   Denies muscle weakness   Denies nausea or vomiting     He endorses depression and confusion   Currently taking Paxil     24 hour urine for calcium:   Results for UJAN SMALLWOOD (MRN [id number]) as of 2/20/2017 13:57   Ref. Range 12/12/2016 09:12   Calcium, Urine Latest Ref Range: 0.0 - 15.0 mg/dL 12.6   Calcium, 24H Urine Latest Ref Range: 4 - 12 mg/Hr 10   CA Urine (mg/Spec) Latest Units: mg/Spec 234       Patient also has Hep C and is currently being evaluated by Hepatology     Review of Systems   Constitutional: Positive for fatigue. Negative for unexpected weight change.   HENT: Negative for sore throat, trouble swallowing and voice change.    Eyes: Negative for visual disturbance.   Respiratory: Negative for cough and shortness of  breath.    Cardiovascular: Negative for chest pain, palpitations and leg swelling.   Gastrointestinal: Negative for abdominal pain, constipation, diarrhea, nausea and vomiting.   Endocrine: Negative for cold intolerance, heat intolerance, polydipsia, polyphagia and polyuria.   Genitourinary: Negative for dysuria.   Musculoskeletal: Positive for back pain. Negative for myalgias.   Skin: Negative for rash.   Allergic/Immunologic: Negative for immunocompromised state.   Neurological: Negative for tremors and headaches.   Hematological: Does not bruise/bleed easily.   Psychiatric/Behavioral: The patient is not nervous/anxious.        Objective:      Physical Exam   Constitutional: He is oriented to person, place, and time. He appears well-developed. No distress.   Cardiovascular: Normal rate.    Pulmonary/Chest: Effort normal.   Musculoskeletal: He exhibits no edema.   Neurological: He is alert and oriented to person, place, and time. Gait normal.        Skin: Skin is warm.   Psychiatric: He has a normal mood and affect. Judgment normal.   Nursing note and vitals reviewed.      LABS:   Results for JUAN SMALLWOOD (MRN [id number]) as of 6/13/2018 14:21   Ref. Range 5/8/2018 10:07   Sodium Latest Ref Range: 136 - 145 mmol/L 139   Potassium Latest Ref Range: 3.5 - 5.1 mmol/L 4.7   Chloride Latest Ref Range: 95 - 110 mmol/L 105   CO2 Latest Ref Range: 23 - 29 mmol/L 27   Anion Gap Latest Ref Range: 8 - 16 mmol/L 7 (L)   BUN, Bld Latest Ref Range: 6 - 20 mg/dL 14   Creatinine Latest Ref Range: 0.5 - 1.4 mg/dL 0.9   eGFR if non African American Latest Ref Range: >60 mL/min/1.73 m^2 >60   eGFR if  Latest Ref Range: >60 mL/min/1.73 m^2 >60   Glucose Latest Ref Range: 70 - 110 mg/dL 124 (H)   Calcium Latest Ref Range: 8.7 - 10.5 mg/dL 11.0 (H)   Alkaline Phosphatase Latest Ref Range: 55 - 135 U/L 108   Total Protein Latest Ref Range: 6.0 - 8.4 g/dL 8.1   Albumin Latest Ref Range: 3.5 - 5.2 g/dL 3.8   Total Bilirubin  Latest Ref Range: 0.1 - 1.0 mg/dL 0.7   AST Latest Ref Range: 10 - 40 U/L 100 (H)   ALT Latest Ref Range: 10 - 44 U/L 138 (H)     Results for JUAN SMALLWOOD (MRN [id number]) as of 6/13/2018 14:21   Ref. Range 6/10/2017 07:52   Vit D, 25-Hydroxy Latest Ref Range: 30 - 96 ng/mL 60     Results for JUAN SMALLWOOD (MRN [id number]) as of 6/13/2018 14:21   Ref. Range 6/8/2017 12:21 6/8/2017 15:56 6/9/2017 05:13 6/10/2017 07:52   PTH Latest Ref Range: 9.0 - 77.0 pg/mL  115.0 (H) 124.6 (H) 154.0 (H)   PTH Intraoperative Latest Ref Range: 9.0 - 77.0 pg/mL 118.3 (H)        NM Parathyroid scan from 3/17/2017:   Findings: Mildly irregular activity in the thyroid gland most likely related to thyroid nodules as seen on the recent ultrasound of the neck. Otherwise, no evidence of any differential washout       Impression           No scintigraphic evidence of parathyroid adenoma in the neck or the mediastinum.       Thyroid ultrasound from 3/17/2017:   Impression        1.) 1.35 x 0.79 x 1.01 solid heterogeneous nodule is seen in the right mid pole that does not meet criteria for FNA biopsy    2.) There are two foci inferior to the right and left thyroid lobes that are most likely enlarged reactive lymph nodes with the presence of a fatty hilum, but could represent parathyroid adenomas       BMD from 03/17/2017   Impression     Osteopenia, mild of the femoral neck. FRAX calculation does not support treatment for osteoporosis. BMD excelent despite primary hyperparathyroidism    Recommendations:  1) Adequate calcium and Vitamin D therapy  2) Appropriate exercise  3) Consider repeat BMD in 2- 4 years       Assessment:       1. Primary hyperparathyroidism    2. Hypercalcemia        Plan:       1. Primary hyperparathyroidism - with elevated serum calcium from 05/2018   -- s/p 4 gland exploration in June 2017  -- pt is not interested in pursuing surgical management at this time   -- discussed indications for Sensipar   -- reviewed use and  side effects   -- case discussed in consultation with    -- start Sensipar 30 mg once daily with breakfast   -- repeat labs in 2 weeks   -- discussed signs and symptoms hypocalcemia , pt voiced understanding     2. Hypercalcemia   -- see #1   -- advised to stay well hydrated and to avoid prolonged periods of immobilization

## 2018-06-25 ENCOUNTER — TELEPHONE (OUTPATIENT)
Dept: HEPATOLOGY | Facility: CLINIC | Age: 58
End: 2018-06-25

## 2018-06-25 NOTE — TELEPHONE ENCOUNTER
----- Message from Ariane Vyas sent at 6/25/2018  1:28 PM CDT -----  Contact: patient   Patient calling to cancel both appointments that's schedule for Wednesday, June 27, 2018 and Thursday, June 28, 2018 and would like to reschedule for next Thursday, July 5, 2018.        Please call [phone]

## 2018-07-12 DIAGNOSIS — B18.2 CHRONIC HEPATITIS C WITHOUT HEPATIC COMA: ICD-10-CM

## 2018-07-12 DIAGNOSIS — Z00.6 RESEARCH EXAM: Primary | ICD-10-CM

## 2018-07-18 ENCOUNTER — OFFICE VISIT (OUTPATIENT)
Dept: HEPATOLOGY | Facility: CLINIC | Age: 58
End: 2018-07-18
Payer: MEDICARE

## 2018-07-18 ENCOUNTER — LAB VISIT (OUTPATIENT)
Dept: LAB | Facility: HOSPITAL | Age: 58
End: 2018-07-18
Attending: SURGERY
Payer: MEDICARE

## 2018-07-18 ENCOUNTER — TELEPHONE (OUTPATIENT)
Dept: HEPATOLOGY | Facility: CLINIC | Age: 58
End: 2018-07-18

## 2018-07-18 ENCOUNTER — RESEARCH ENCOUNTER (OUTPATIENT)
Dept: RESEARCH | Facility: HOSPITAL | Age: 58
End: 2018-07-18

## 2018-07-18 VITALS
DIASTOLIC BLOOD PRESSURE: 75 MMHG | SYSTOLIC BLOOD PRESSURE: 135 MMHG | WEIGHT: 209.19 LBS | TEMPERATURE: 97 F | RESPIRATION RATE: 18 BRPM | OXYGEN SATURATION: 95 % | BODY MASS INDEX: 29.29 KG/M2 | HEART RATE: 73 BPM | HEIGHT: 71 IN

## 2018-07-18 DIAGNOSIS — B18.2 CHRONIC HEPATITIS C WITHOUT HEPATIC COMA: ICD-10-CM

## 2018-07-18 DIAGNOSIS — Z23 NEED FOR HEPATITIS VACCINATION: Primary | ICD-10-CM

## 2018-07-18 DIAGNOSIS — K74.60 HEPATIC CIRRHOSIS, UNSPECIFIED HEPATIC CIRRHOSIS TYPE, UNSPECIFIED WHETHER ASCITES PRESENT: ICD-10-CM

## 2018-07-18 DIAGNOSIS — Z00.6 RESEARCH EXAM: ICD-10-CM

## 2018-07-18 DIAGNOSIS — I85.10 SECONDARY ESOPHAGEAL VARICES WITHOUT BLEEDING: ICD-10-CM

## 2018-07-18 DIAGNOSIS — K76.6 PORTAL VENOUS HYPERTENSION: ICD-10-CM

## 2018-07-18 DIAGNOSIS — B18.2 CHRONIC HEPATITIS C WITHOUT HEPATIC COMA: Primary | ICD-10-CM

## 2018-07-18 LAB
ALBUMIN SERPL BCP-MCNC: 3.6 G/DL
ALP SERPL-CCNC: 122 U/L
ALT SERPL W/O P-5'-P-CCNC: 125 U/L
ANION GAP SERPL CALC-SCNC: 5 MMOL/L
AST SERPL-CCNC: 103 U/L
BILIRUB SERPL-MCNC: 0.7 MG/DL
BUN SERPL-MCNC: 12 MG/DL
CALCIUM SERPL-MCNC: 11.5 MG/DL
CHLORIDE SERPL-SCNC: 103 MMOL/L
CO2 SERPL-SCNC: 30 MMOL/L
CREAT SERPL-MCNC: 1 MG/DL
EST. GFR  (AFRICAN AMERICAN): >60 ML/MIN/1.73 M^2
EST. GFR  (NON AFRICAN AMERICAN): >60 ML/MIN/1.73 M^2
GLUCOSE SERPL-MCNC: 111 MG/DL
POTASSIUM SERPL-SCNC: 5.1 MMOL/L
PROT SERPL-MCNC: 8 G/DL
RESEARCH LAB: NORMAL
SODIUM SERPL-SCNC: 138 MMOL/L

## 2018-07-18 PROCEDURE — 99214 OFFICE O/P EST MOD 30 MIN: CPT | Mod: S$GLB,,, | Performed by: PHYSICIAN ASSISTANT

## 2018-07-18 PROCEDURE — 3008F BODY MASS INDEX DOCD: CPT | Mod: CPTII,S$GLB,, | Performed by: PHYSICIAN ASSISTANT

## 2018-07-18 PROCEDURE — 99999 PR PBB SHADOW E&M-EST. PATIENT-LVL IV: CPT | Mod: PBBFAC,,, | Performed by: PHYSICIAN ASSISTANT

## 2018-07-18 PROCEDURE — 87522 HEPATITIS C REVRS TRNSCRPJ: CPT

## 2018-07-18 PROCEDURE — 80053 COMPREHEN METABOLIC PANEL: CPT

## 2018-07-18 PROCEDURE — 99499 UNLISTED E&M SERVICE: CPT | Mod: HCNC,S$GLB,, | Performed by: PHYSICIAN ASSISTANT

## 2018-07-18 RX ORDER — VELPATASVIR AND SOFOSBUVIR 100; 400 MG/1; MG/1
1 TABLET, FILM COATED ORAL DAILY
Qty: 28 TABLET | Refills: 2 | Status: SHIPPED | OUTPATIENT
Start: 2018-07-18 | End: 2018-07-25

## 2018-07-18 NOTE — TELEPHONE ENCOUNTER
----- Message from Lillian Gambino RN sent at 7/18/2018  4:33 PM CDT -----  Patient came back to office.  Hep B vaccine series not covered in pharmacy.  Series scheduled in ID; appt notices provided.

## 2018-07-18 NOTE — PROGRESS NOTES
RESEARCH STUDY CONSENT ENCOUNTER  ORGAN TRANSPLANT  [facility] TERRENCE KUMARI    Study: Role of Tumor-Induced Immune Tolerance in the Patient Response to Locoregional Therapy: Implications in Assessment Risk of Hepatocellular Carcinoma Recurrence Following Liver Transplantation    IRB Approval #: [id number].[id number]    : Dutch Brooke MD  Sub-investigator: Javi Shrestha, PhD    This study is an observational study to evaluate patients receiving transarterial chemoembolization (TACE) or transarterial radioembolization (TARE) therapy to define the link between tumor-elicited peripheral cell populations, and the risk of hepatocellular carcinoma (HCC) recurrence before orthotopic liver transplantation (OLT). [IRB 2016.131.B]      The study was explained and the Informed Consent was reviewed and discussed with Lucio Mendoza.  All risks, benefits, and procedures were discussed.  Adequate time was given for the him to ask questions.  The patient confirmed that all the questions had been answered and voluntarily expressed a desire to participate in the study.  Patient denied involvement in any other research studies and initialed the Informed Consent form.  Patient was provided with contact information for the investigator, physician, and research coordinator for future questions or concerns.  Patient was notified that he can withdraw at any time.  Privacy issues including HIPAA and withdrawal questions were discussed.  Lucio Mendoza verbalized that all questions were satisfactorily answered and that they understood the protocol and its requirements.  Patient signed the informed consent document, and a signed copy was provided to him.  Family members were present.    Jessica Frankel
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
## 2018-07-19 ENCOUNTER — TELEPHONE (OUTPATIENT)
Dept: ENDOCRINOLOGY | Facility: CLINIC | Age: 58
End: 2018-07-19

## 2018-07-19 ENCOUNTER — TELEPHONE (OUTPATIENT)
Dept: PHARMACY | Facility: CLINIC | Age: 58
End: 2018-07-19

## 2018-07-19 ENCOUNTER — TELEPHONE (OUTPATIENT)
Dept: HEPATOLOGY | Facility: CLINIC | Age: 58
End: 2018-07-19

## 2018-07-19 DIAGNOSIS — B18.2 CHRONIC HEPATITIS C WITHOUT HEPATIC COMA: Primary | ICD-10-CM

## 2018-07-19 NOTE — TELEPHONE ENCOUNTER
pls tell pt / significant other:  Calcium level is higher now than when it was last checked in May    It is important that this is treated b/c high calcium can lead to all sort so problems - kidney stones, kidneys not functioning well, heart rhythm problems that could be fatal, psychiatric problems, bone pain, GI problems.    I am going to forward the results to the endocrine provider he saw in June so they can follow up on this, but he needs to take the sensipar. If it causes side effects he needs to discuss these w/ endocrine.

## 2018-07-19 NOTE — TELEPHONE ENCOUNTER
LVM on pts VM today with this information asked them to call our office back very important that we go over this information asap and for them to take the sensipar medication as directed and call the endocrinologist office asap,.

## 2018-07-19 NOTE — TELEPHONE ENCOUNTER
I s/w Deisy pts SO this afternoon went over this information and noticed that endocrinology has been trying to reach them as well for an urgent appt I will forward a msg over to them to contact the pt today.

## 2018-07-19 NOTE — TELEPHONE ENCOUNTER
Labs / chart reviewed:    Appears pt has hx of primary hyperparathyroidism  S/p surgical exploration w/ Dr Masterson 6/2017. Reoperation to remove parathyroid was discussed at that time but pt wanted to pursue medical mngmt.      7/18/18 - Ca 11.5, Albumin 3.6  _________________________________________  pls call pt (may need to speak to significant other)  Tell him his blood from yesterday shows his calcium level is elevated again.  This is likely related to the parathyroid problem he had before.    Med list shows Sensipar - Is he taking it?     Ask him if he is following w/ endocrinology b/c I don't see any recent notes in EPIC.

## 2018-07-19 NOTE — TELEPHONE ENCOUNTER
S/w pts SO today she states he doesn;t like the way the sensipar makes him feel so he is not taking that now and he just saw Endocrinology back in June looks like that's when they gave him the medicaton refills please advise thanks.

## 2018-07-19 NOTE — TELEPHONE ENCOUNTER
Called and left a detail message on both phone numbers that  Is listed in his chart to give us a call back regards to appointment

## 2018-07-20 ENCOUNTER — TELEPHONE (OUTPATIENT)
Dept: ENDOCRINOLOGY | Facility: CLINIC | Age: 58
End: 2018-07-20

## 2018-07-20 LAB
HCV LOG: 6.48 LOG (10) IU/ML
HCV RNA QUANT PCR: ABNORMAL IU/ML
HCV, QUALITATIVE: DETECTED IU/ML

## 2018-07-20 NOTE — TELEPHONE ENCOUNTER
----- Message from Celine Dexter MA sent at 7/19/2018  5:00 PM CDT -----  Arturo Wu, can you please call Ms Olivas liane LAWS and get htem set up for that appt she appologized and said they were out of the house today and she couldn't get your call thanks.

## 2018-07-23 ENCOUNTER — TELEPHONE (OUTPATIENT)
Dept: PHARMACY | Facility: CLINIC | Age: 58
End: 2018-07-23

## 2018-07-24 ENCOUNTER — TELEPHONE (OUTPATIENT)
Dept: HEPATOLOGY | Facility: CLINIC | Age: 58
End: 2018-07-24

## 2018-07-25 RX ORDER — LEDIPASVIR AND SOFOSBUVIR 90; 400 MG/1; MG/1
1 TABLET, FILM COATED ORAL DAILY
Qty: 28 TABLET | Refills: 5 | Status: SHIPPED | OUTPATIENT
Start: 2018-07-25 | End: 2019-02-03 | Stop reason: ALTCHOICE

## 2018-07-25 NOTE — TELEPHONE ENCOUNTER
----- Message from Camille Manuel, Meghan sent at 7/23/2018  2:43 PM CDT -----  Regarding: hep  b  Good afternoon! Mr. Mendoza's PA for the immunization is covered in the pharmacy at $57.19. Will continue to try to contact the patient to come in. Thank you!

## 2018-07-25 NOTE — TELEPHONE ENCOUNTER
Insurance denied epclusa x 12 but will cover harvoni x 24.    Rx changed to 24 weeks Chrissy Acosta, please explain change to patient and let him know I am okay with this change.

## 2018-07-26 ENCOUNTER — TELEPHONE (OUTPATIENT)
Dept: PHARMACY | Facility: CLINIC | Age: 58
End: 2018-07-26

## 2018-07-26 NOTE — TELEPHONE ENCOUNTER
DOCUMENTATION ONLY:  Prior authorization for Chrissy approved from 07/24/2018 to 01/08/2019 x 24 weeks of treatment.   Case ID# None    Co-pay: $986.32    Patient Assistance IS required.     Forward to patient assistance for review.

## 2018-07-30 NOTE — TELEPHONE ENCOUNTER
Harvoni is approved by the patient's insurance X 24 weeks.  We will reach out to the patient to notify of the approval, offer consultation, and schedule a delivery of the medication.   Sending Jennifer Scheuermann PA a staff message regarding approval.

## 2018-07-30 NOTE — TELEPHONE ENCOUNTER
FOR DOCUMENTATION ONLY:  Financial Assistance for Mavyret is approved from 5-1-18 to 7-29-19  Source [employer]  BIN: [id number]  PCN: [id number]  Id: [id number]  GRP:[id number]  7200.00 Karel

## 2018-08-02 ENCOUNTER — TELEPHONE (OUTPATIENT)
Dept: PHARMACY | Facility: CLINIC | Age: 58
End: 2018-08-02

## 2018-08-02 NOTE — TELEPHONE ENCOUNTER
Patient called for initial consult and ship on Harvoni - na - no option to leave vm.     DEBORAH Delgado.Ph.  Clinical Pharmacist  [facility]  Phone: [phone]

## 2018-08-07 NOTE — TELEPHONE ENCOUNTER
Second call for initial consult and ship on harvoni - na - no option for vm.     DEBORAH Delgado.Ph.  Clinical Pharmacist  [facility]  Phone: [phone]

## 2018-08-09 NOTE — TELEPHONE ENCOUNTER
Consult completed with Deisy Larose.    Initial Harvoni 90/400mg consult completed on . Harvoni 90/400mg will be shipped on  to arrive at patient's home on  via [employer]. $0.00 copay. Patient will start Harvoni 90/400mg on 8/15. Address confirmed, CC on file. Confirmed 2 patient identifiers - name and . Therapy Appropriate.    Harvoni- Take one tablet by mouth daily x 24 weeks  Counseling was reviewed:   1. Patient MUST take Harvoni at the SAME time every day.   2. Patient MUST avoid acid reducers without consulting with myself or provider first. Antacids are to be spaced out at least 4 hours apart from Harvoni.   3. Side effects include headaches and fatigue.   Headache: Patient may treat with OTC remedies. If Tylenol is used, dose should not exceed 2000mg per day.    DDI: Medication list reviewed and potential DDIs addressed. No DDIs or allergies noted. Patient MUST contact myself or provider prior to starting any new OTC, herbal, or prescription drugs to avoid potential DDIs.    Discussed the importance of staying well hydrated while on therapy. Compliance stressed - patient to take missed doses as soon as remembered, but NOT to take 2 doses in one day. Patient will report questions or concerns to myself or practitioner. Patient verbalizes understanding. Patient plans to start Harvoni on 8/15. Consultation included: indication; goals of treatment; administration; storage and handling; side effects; how to handle side effects; the importance of compliance; how to handle missed doses; the importance of laboratory monitoring; the importance of keeping all follow up appointments.  Patient understands to report any medication changes to [facility] and provider. All questions answered and addressed to patients satisfaction.  I will f/u with patient in 1 week from start, and [facility] will contact patient in 3 weeks to coordinate next refill.

## 2018-08-14 ENCOUNTER — TELEPHONE (OUTPATIENT)
Dept: HEPATOLOGY | Facility: CLINIC | Age: 58
End: 2018-08-14

## 2018-08-14 DIAGNOSIS — K74.60 CIRRHOSIS OF LIVER WITHOUT ASCITES, UNSPECIFIED HEPATIC CIRRHOSIS TYPE: Primary | ICD-10-CM

## 2018-08-14 DIAGNOSIS — B18.2 CHRONIC HEPATITIS C WITHOUT HEPATIC COMA: ICD-10-CM

## 2018-08-14 NOTE — TELEPHONE ENCOUNTER
Pt beginning 24 weeks of Harvoni on 8/15/18  Emily 1a, tx naive  Prior HCV rx - IFN?    Pls schedule:  - CMP, HCV RNA at week 6 - 9/25  - CBC, INR, CMP, AFP at week 12 - 11/6  - U/S abdomen 11/2018  - CMP at week 18 - 12/18  - CMP, HCV RNA at week 24 - end of treatment - 1/29  - CMP, HCV RNA - SVR6 - 3/12  - CMP, HCV RNA - SVR12 - 4/23

## 2018-08-15 ENCOUNTER — CLINICAL SUPPORT (OUTPATIENT)
Dept: INFECTIOUS DISEASES | Facility: CLINIC | Age: 58
End: 2018-08-15
Payer: MEDICARE

## 2018-08-15 ENCOUNTER — EPISODE CHANGES (OUTPATIENT)
Dept: HEPATOLOGY | Facility: CLINIC | Age: 58
End: 2018-08-15

## 2018-08-15 DIAGNOSIS — Z23 NEED FOR HEPATITIS VACCINATION: ICD-10-CM

## 2018-08-15 PROCEDURE — 90746 HEPB VACCINE 3 DOSE ADULT IM: CPT | Mod: S$GLB,,, | Performed by: INTERNAL MEDICINE

## 2018-08-15 PROCEDURE — G0010 ADMIN HEPATITIS B VACCINE: HCPCS | Mod: S$GLB,,, | Performed by: INTERNAL MEDICINE

## 2018-08-15 NOTE — PROGRESS NOTES
Pt received the Hepatitis B vaccination. Pt tolerated the injection well. Return appts provided. Pt left the unit in NAD.

## 2018-08-16 ENCOUNTER — HOSPITAL ENCOUNTER (EMERGENCY)
Facility: HOSPITAL | Age: 58
Discharge: HOME | End: 2018-08-16
Attending: EMERGENCY MEDICINE
Payer: MEDICARE

## 2018-08-16 VITALS
RESPIRATION RATE: 18 BRPM | DIASTOLIC BLOOD PRESSURE: 77 MMHG | TEMPERATURE: 98 F | WEIGHT: 209 LBS | OXYGEN SATURATION: 98 % | SYSTOLIC BLOOD PRESSURE: 148 MMHG | HEART RATE: 73 BPM | HEIGHT: 71 IN | BODY MASS INDEX: 29.26 KG/M2

## 2018-08-16 DIAGNOSIS — R10.11 RIGHT UPPER QUADRANT ABDOMINAL PAIN: Primary | ICD-10-CM

## 2018-08-16 DIAGNOSIS — K74.60 CIRRHOSIS OF LIVER WITHOUT ASCITES, UNSPECIFIED HEPATIC CIRRHOSIS TYPE: ICD-10-CM

## 2018-08-16 DIAGNOSIS — K80.20 CALCULUS OF GALLBLADDER WITHOUT CHOLECYSTITIS WITHOUT OBSTRUCTION: ICD-10-CM

## 2018-08-16 LAB
ALBUMIN SERPL BCP-MCNC: 4.1 G/DL
ALP SERPL-CCNC: 102 U/L
ALT SERPL W/O P-5'-P-CCNC: 138 U/L
ANION GAP SERPL CALC-SCNC: 9 MMOL/L
AST SERPL-CCNC: 99 U/L
BASOPHILS # BLD AUTO: 0.02 K/UL
BASOPHILS NFR BLD: 0.3 %
BILIRUB SERPL-MCNC: 0.6 MG/DL
BILIRUB UR QL STRIP: NEGATIVE
BUN SERPL-MCNC: 16 MG/DL
CALCIUM SERPL-MCNC: 9.4 MG/DL
CHLORIDE SERPL-SCNC: 104 MMOL/L
CLARITY UR: CLEAR
CO2 SERPL-SCNC: 27 MMOL/L
COLOR UR: YELLOW
CREAT SERPL-MCNC: 1 MG/DL
DIFFERENTIAL METHOD: ABNORMAL
EOSINOPHIL # BLD AUTO: 0.1 K/UL
EOSINOPHIL NFR BLD: 1 %
ERYTHROCYTE [DISTWIDTH] IN BLOOD BY AUTOMATED COUNT: 13.4 %
EST. GFR  (AFRICAN AMERICAN): >60 ML/MIN/1.73 M^2
EST. GFR  (NON AFRICAN AMERICAN): >60 ML/MIN/1.73 M^2
GLUCOSE SERPL-MCNC: 108 MG/DL
GLUCOSE UR QL STRIP: NEGATIVE
HCT VFR BLD AUTO: 42.3 %
HGB BLD-MCNC: 14.2 G/DL
HGB UR QL STRIP: NEGATIVE
KETONES UR QL STRIP: NEGATIVE
LEUKOCYTE ESTERASE UR QL STRIP: NEGATIVE
LIPASE SERPL-CCNC: 71 U/L
LYMPHOCYTES # BLD AUTO: 2.8 K/UL
LYMPHOCYTES NFR BLD: 41.7 %
MCH RBC QN AUTO: 29.6 PG
MCHC RBC AUTO-ENTMCNC: 33.6 G/DL
MCV RBC AUTO: 88 FL
MONOCYTES # BLD AUTO: 0.5 K/UL
MONOCYTES NFR BLD: 6.8 %
NEUTROPHILS # BLD AUTO: 3.4 K/UL
NEUTROPHILS NFR BLD: 50.1 %
NITRITE UR QL STRIP: NEGATIVE
PH UR STRIP: 6 [PH] (ref 5–8)
PLATELET # BLD AUTO: 136 K/UL
PMV BLD AUTO: 10.5 FL
POTASSIUM SERPL-SCNC: 4 MMOL/L
PROT SERPL-MCNC: 8.4 G/DL
PROT UR QL STRIP: NEGATIVE
RBC # BLD AUTO: 4.79 M/UL
SODIUM SERPL-SCNC: 140 MMOL/L
SP GR UR STRIP: <=1.005 (ref 1–1.03)
URN SPEC COLLECT METH UR: ABNORMAL
UROBILINOGEN UR STRIP-ACNC: 1 EU/DL
WBC # BLD AUTO: 6.72 K/UL

## 2018-08-16 PROCEDURE — 85025 COMPLETE CBC W/AUTO DIFF WBC: CPT

## 2018-08-16 PROCEDURE — 81003 URINALYSIS AUTO W/O SCOPE: CPT

## 2018-08-16 PROCEDURE — 99284 EMERGENCY DEPT VISIT MOD MDM: CPT | Mod: 25

## 2018-08-16 PROCEDURE — 25500020 PHARM REV CODE 255: Performed by: EMERGENCY MEDICINE

## 2018-08-16 PROCEDURE — 83690 ASSAY OF LIPASE: CPT

## 2018-08-16 PROCEDURE — 80053 COMPREHEN METABOLIC PANEL: CPT

## 2018-08-16 RX ADMIN — IOHEXOL 100 ML: 350 INJECTION, SOLUTION INTRAVENOUS at 08:08
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
## 2018-08-16 NOTE — ED NOTES
APPEARANCE: Alert, oriented and in no acute distress.  CARDIAC: Normal rate and rhythm, no murmur heard. Obese  PERIPHERAL VASCULAR: peripheral pulses present. Normal cap refill. No edema. Warm to touch.    RESPIRATORY:Normal rate and effort, breath sounds clear bilaterally throughout chest. Respirations are equal and unlabored no obvious signs of distress.  GASTRO: soft, bowel sounds normal, no tenderness, no abdominal distention. Obese. Complains of bilateral side pain x 4 days  MUSC: Full ROM. No bony tenderness or soft tissue tenderness. No obvious deformity.  SKIN: Skin is warm and dry, normal skin turgor, mucous membranes moist.  NEURO: 5/5 strength major flexors/extensors bilaterally. Sensory intact to light touch bilaterally. Laurens coma scale: eyes open spontaneously-4, oriented & converses-5, obeys commands-6. No neurological abnormalities.   MENTAL STATUS: awake, alert and aware of environment.  EYE: PERRL, both eyes: pupils brisk and reactive to light. Normal size.  ENT: EARS: no obvious drainage. NOSE: no active bleeding.   Abdominal Pain- c/o intermittent pain to bilateral sides of his abdomen for the past 4 days. Pt started treatement for hepatitis c yesterday

## 2018-08-21 ENCOUNTER — LAB VISIT (OUTPATIENT)
Dept: LAB | Facility: HOSPITAL | Age: 58
End: 2018-08-21
Attending: FAMILY MEDICINE
Payer: MEDICARE

## 2018-08-21 ENCOUNTER — OFFICE VISIT (OUTPATIENT)
Dept: FAMILY MEDICINE | Facility: CLINIC | Age: 58
End: 2018-08-21
Payer: MEDICARE

## 2018-08-21 VITALS
DIASTOLIC BLOOD PRESSURE: 78 MMHG | SYSTOLIC BLOOD PRESSURE: 128 MMHG | HEART RATE: 88 BPM | WEIGHT: 199.38 LBS | BODY MASS INDEX: 27.91 KG/M2 | TEMPERATURE: 98 F | HEIGHT: 71 IN

## 2018-08-21 DIAGNOSIS — Z87.820 HISTORY OF TRAUMATIC BRAIN INJURY: ICD-10-CM

## 2018-08-21 DIAGNOSIS — F33.9 RECURRENT MAJOR DEPRESSIVE DISORDER, REMISSION STATUS UNSPECIFIED: ICD-10-CM

## 2018-08-21 DIAGNOSIS — B18.2 CHRONIC HEPATITIS C WITHOUT HEPATIC COMA: ICD-10-CM

## 2018-08-21 DIAGNOSIS — F41.9 ANXIETY: ICD-10-CM

## 2018-08-21 DIAGNOSIS — R00.0 TACHYCARDIA: ICD-10-CM

## 2018-08-21 DIAGNOSIS — M54.9 CHRONIC BACK PAIN, UNSPECIFIED BACK LOCATION, UNSPECIFIED BACK PAIN LATERALITY: ICD-10-CM

## 2018-08-21 DIAGNOSIS — K80.20 CALCULUS OF GALLBLADDER WITHOUT CHOLECYSTITIS WITHOUT OBSTRUCTION: ICD-10-CM

## 2018-08-21 DIAGNOSIS — G89.4 CHRONIC PAIN SYNDROME: ICD-10-CM

## 2018-08-21 DIAGNOSIS — E21.0 PRIMARY HYPERPARATHYROIDISM: ICD-10-CM

## 2018-08-21 DIAGNOSIS — G89.29 CHRONIC BACK PAIN, UNSPECIFIED BACK LOCATION, UNSPECIFIED BACK PAIN LATERALITY: ICD-10-CM

## 2018-08-21 DIAGNOSIS — K74.60 ESOPHAGEAL VARICES IN CIRRHOSIS: ICD-10-CM

## 2018-08-21 DIAGNOSIS — K74.60 CIRRHOSIS OF LIVER WITHOUT ASCITES, UNSPECIFIED HEPATIC CIRRHOSIS TYPE: ICD-10-CM

## 2018-08-21 DIAGNOSIS — I85.10 ESOPHAGEAL VARICES IN CIRRHOSIS: ICD-10-CM

## 2018-08-21 DIAGNOSIS — Z00.00 ROUTINE HISTORY AND PHYSICAL EXAMINATION OF ADULT: Primary | ICD-10-CM

## 2018-08-21 PROBLEM — K76.9 LIVER LESION: Status: RESOLVED | Noted: 2017-02-13 | Resolved: 2018-08-21

## 2018-08-21 LAB — TSH SERPL DL<=0.005 MIU/L-ACNC: 3.86 UIU/ML

## 2018-08-21 PROCEDURE — 93005 ELECTROCARDIOGRAM TRACING: CPT | Mod: S$GLB,,, | Performed by: FAMILY MEDICINE

## 2018-08-21 PROCEDURE — 99499 UNLISTED E&M SERVICE: CPT | Mod: HCNC,S$GLB,, | Performed by: FAMILY MEDICINE

## 2018-08-21 PROCEDURE — 93010 ELECTROCARDIOGRAM REPORT: CPT | Mod: S$GLB,,, | Performed by: INTERNAL MEDICINE

## 2018-08-21 PROCEDURE — 99396 PREV VISIT EST AGE 40-64: CPT | Mod: S$GLB,,, | Performed by: FAMILY MEDICINE

## 2018-08-21 PROCEDURE — 36415 COLL VENOUS BLD VENIPUNCTURE: CPT | Mod: PO

## 2018-08-21 PROCEDURE — 84443 ASSAY THYROID STIM HORMONE: CPT

## 2018-08-21 PROCEDURE — 99999 PR PBB SHADOW E&M-EST. PATIENT-LVL IV: CPT | Mod: PBBFAC,,, | Performed by: FAMILY MEDICINE
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
## 2018-08-23 ENCOUNTER — TELEPHONE (OUTPATIENT)
Dept: PHARMACY | Facility: CLINIC | Age: 58
End: 2018-08-23

## 2018-08-23 NOTE — TELEPHONE ENCOUNTER
Spoke with Deisy, caregiver.  Initial clinical follow-up conducted for Chrissy.  Name/ confirmed. no missed doses; no new medications; no side effects noted.  Start date of Chrissy confirmed for 8/15.  Report any medication changes to [facility] and provider.  All questions answered and addressed.

## 2018-08-24 ENCOUNTER — TELEPHONE (OUTPATIENT)
Dept: FAMILY MEDICINE | Facility: CLINIC | Age: 58
End: 2018-08-24

## 2018-08-24 NOTE — TELEPHONE ENCOUNTER
----- Message from Ronda Valdez sent at 8/23/2018  4:18 PM CDT -----  Contact: Pt   Pt request his test results be mailed to his home. Request call back to know if it will be sent .[phone] (home)

## 2018-08-27 ENCOUNTER — CLINICAL SUPPORT (OUTPATIENT)
Dept: CARDIOLOGY | Facility: CLINIC | Age: 58
End: 2018-08-27
Attending: FAMILY MEDICINE
Payer: MEDICARE

## 2018-08-27 ENCOUNTER — OFFICE VISIT (OUTPATIENT)
Dept: SURGERY | Facility: CLINIC | Age: 58
End: 2018-08-27
Payer: MEDICARE

## 2018-08-27 VITALS
HEIGHT: 71 IN | WEIGHT: 200.06 LBS | HEART RATE: 77 BPM | TEMPERATURE: 98 F | DIASTOLIC BLOOD PRESSURE: 81 MMHG | BODY MASS INDEX: 28.01 KG/M2 | SYSTOLIC BLOOD PRESSURE: 137 MMHG

## 2018-08-27 DIAGNOSIS — K80.20 CALCULUS OF GALLBLADDER WITHOUT CHOLECYSTITIS WITHOUT OBSTRUCTION: ICD-10-CM

## 2018-08-27 DIAGNOSIS — K74.60 CIRRHOSIS OF LIVER WITHOUT ASCITES, UNSPECIFIED HEPATIC CIRRHOSIS TYPE: Primary | ICD-10-CM

## 2018-08-27 DIAGNOSIS — B18.2 CHRONIC HEPATITIS C WITHOUT HEPATIC COMA: ICD-10-CM

## 2018-08-27 PROCEDURE — 99999 PR PBB SHADOW E&M-EST. PATIENT-LVL III: CPT | Mod: PBBFAC,,, | Performed by: STUDENT IN AN ORGANIZED HEALTH CARE EDUCATION/TRAINING PROGRAM

## 2018-08-27 PROCEDURE — 93226 XTRNL ECG REC<48 HR SCAN A/R: CPT | Mod: PBBFAC,PO | Performed by: INTERNAL MEDICINE

## 2018-08-27 PROCEDURE — 99214 OFFICE O/P EST MOD 30 MIN: CPT | Mod: S$GLB,,, | Performed by: STUDENT IN AN ORGANIZED HEALTH CARE EDUCATION/TRAINING PROGRAM

## 2018-08-27 PROCEDURE — 93227 XTRNL ECG REC<48 HR R&I: CPT | Mod: S$PBB,,, | Performed by: INTERNAL MEDICINE

## 2018-08-27 PROCEDURE — 3008F BODY MASS INDEX DOCD: CPT | Mod: CPTII,S$GLB,, | Performed by: STUDENT IN AN ORGANIZED HEALTH CARE EDUCATION/TRAINING PROGRAM

## 2018-08-27 NOTE — PROGRESS NOTES
Verified pt name and .  Pt signed consent stated that the monitor will be returned to 8th floor Cardiology at the [facility].  Pt prepped and EKG leads placed.  Pt to be on holter monitor for 24 hours.  Instructions, diary and extra EKG pads provided.

## 2018-08-27 NOTE — LETTER
August 28, 2018      Marvin Chaudhari MD  [address] [facility] [address]           [address]  4th Floor [facility] [address]  Phone: [phone]          Patient: Lucio Mendoza   MR Number: [id number]   YOB: 1960   Date of Visit: 8/27/2018       Dear Dr. Marvin Chaudhari:    Thank you for referring Lucio Mendoza to me for evaluation. Attached you will find relevant portions of my assessment and plan of care.    If you have questions, please do not hesitate to call me. I look forward to following uLcio Mendoza along with you.    Sincerely,    Edwardo Bernardo MD    Enclosure  CC:  No Recipients    If you would like to receive this communication electronically, please contact [email or web address] or [phone] to request more information on [employer] Link access.    For providers and/or their staff who would like to refer a patient to [facility], please contact us through our one-stop-shop provider referral line, [facility], at [phone].    If you feel you have received this communication in error or would no longer like to receive these types of communications, please e-mail [email or web address]
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
## 2018-08-31 ENCOUNTER — TELEPHONE (OUTPATIENT)
Dept: FAMILY MEDICINE | Facility: CLINIC | Age: 58
End: 2018-08-31

## 2018-08-31 DIAGNOSIS — I47.10 SVT (SUPRAVENTRICULAR TACHYCARDIA): Primary | ICD-10-CM

## 2018-08-31 DIAGNOSIS — R94.31 HOLTER MONITOR, ABNORMAL: ICD-10-CM

## 2018-08-31 NOTE — TELEPHONE ENCOUNTER
----- Message from Cristine Reyes sent at 8/31/2018  8:34 AM CDT -----  Contact: Patient  Patient is requesting the results of his heart monitor, please [phone]. Thank you

## 2018-09-04 ENCOUNTER — TELEPHONE (OUTPATIENT)
Dept: FAMILY MEDICINE | Facility: CLINIC | Age: 58
End: 2018-09-04

## 2018-09-04 ENCOUNTER — TELEPHONE (OUTPATIENT)
Dept: PHARMACY | Facility: CLINIC | Age: 58
End: 2018-09-04

## 2018-09-04 NOTE — TELEPHONE ENCOUNTER
----- Message from Mirta Aquino sent at 9/4/2018  3:37 PM CDT -----  Contact: self   Pt requesting results. Please call back at [phone].      Thanks,  Mirta Aquino

## 2018-09-04 NOTE — TELEPHONE ENCOUNTER
Patient called for refill and follow up on harovni - na - no option to leave voice mail.     Chilango Palacios, [email or web address].  Clinical Pharmacist  [facility]  Phone: [phone]

## 2018-09-06 ENCOUNTER — OFFICE VISIT (OUTPATIENT)
Dept: CARDIOLOGY | Facility: CLINIC | Age: 58
End: 2018-09-06
Payer: MEDICARE

## 2018-09-06 VITALS
BODY MASS INDEX: 27.7 KG/M2 | SYSTOLIC BLOOD PRESSURE: 138 MMHG | DIASTOLIC BLOOD PRESSURE: 84 MMHG | OXYGEN SATURATION: 96 % | HEIGHT: 71 IN | HEART RATE: 79 BPM | WEIGHT: 197.88 LBS

## 2018-09-06 DIAGNOSIS — I10 HTN (HYPERTENSION), BENIGN: ICD-10-CM

## 2018-09-06 DIAGNOSIS — I47.10 SVT (SUPRAVENTRICULAR TACHYCARDIA): ICD-10-CM

## 2018-09-06 PROCEDURE — 99999 PR PBB SHADOW E&M-EST. PATIENT-LVL III: CPT | Mod: PBBFAC,,, | Performed by: INTERNAL MEDICINE

## 2018-09-06 PROCEDURE — 99499 UNLISTED E&M SERVICE: CPT | Mod: HCNC,S$GLB,, | Performed by: INTERNAL MEDICINE

## 2018-09-06 PROCEDURE — 99213 OFFICE O/P EST LOW 20 MIN: CPT | Mod: PBBFAC,PO | Performed by: INTERNAL MEDICINE

## 2018-09-06 PROCEDURE — 99204 OFFICE O/P NEW MOD 45 MIN: CPT | Mod: S$PBB,,, | Performed by: INTERNAL MEDICINE

## 2018-09-06 PROCEDURE — 3008F BODY MASS INDEX DOCD: CPT | Mod: CPTII,,, | Performed by: INTERNAL MEDICINE

## 2018-09-06 RX ORDER — METOPROLOL TARTRATE 25 MG/1
25 TABLET, FILM COATED ORAL 2 TIMES DAILY
Qty: 60 TABLET | Refills: 11 | Status: SHIPPED | OUTPATIENT
Start: 2018-09-06 | End: 2018-09-06 | Stop reason: SDUPTHER

## 2018-09-06 RX ORDER — METOPROLOL TARTRATE 25 MG/1
25 TABLET, FILM COATED ORAL 2 TIMES DAILY
Qty: 60 TABLET | Refills: 11 | Status: SHIPPED | OUTPATIENT
Start: 2018-09-06 | End: 2018-09-07
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
## 2018-09-06 NOTE — LETTER
September 6, 2018      [place]  [address] [address] [address]           [facility]  [address], [address]  [facility] [address]  Phone: [phone]          Patient: Lucio Mendoza   MR Number: [id number]   YOB: 1960   Date of Visit: 9/6/2018       Dear Mariana Greer:    Thank you for referring Lucio Mendoza to me for evaluation. Attached you will find relevant portions of my assessment and plan of care.    If you have questions, please do not hesitate to call me. I look forward to following Lucio Mendoza along with you.    Sincerely,    Lb Vinson MD    Enclosure  CC:  No Recipients    If you would like to receive this communication electronically, please contact [email or web address] or [phone] to request more information on [employer] Link access.    For providers and/or their staff who would like to refer a patient to [facility], please contact us through our one-stop-shop provider referral line, Marguerite Gutierrez, at [phone].    If you feel you have received this communication in error or would no longer like to receive these types of communications, please e-mail [email or web address]

## 2018-09-07 ENCOUNTER — TELEPHONE (OUTPATIENT)
Dept: CARDIOLOGY | Facility: CLINIC | Age: 58
End: 2018-09-07

## 2018-09-07 ENCOUNTER — TELEPHONE (OUTPATIENT)
Dept: HEPATOLOGY | Facility: CLINIC | Age: 58
End: 2018-09-07

## 2018-09-07 DIAGNOSIS — I47.10 SVT (SUPRAVENTRICULAR TACHYCARDIA): Primary | ICD-10-CM

## 2018-09-07 RX ORDER — PROPRANOLOL HYDROCHLORIDE 60 MG/1
60 CAPSULE, EXTENDED RELEASE ORAL DAILY
Qty: 30 CAPSULE | Refills: 11 | Status: SHIPPED | OUTPATIENT
Start: 2018-09-07 | End: 2018-09-26

## 2018-09-07 NOTE — TELEPHONE ENCOUNTER
Patient and his wife was informed that per Dr. Vinson the patient's  metoprolol was switched to propranolol which most liver patients also take.   They expressed understanding.

## 2018-09-07 NOTE — TELEPHONE ENCOUNTER
States patient started taking metoprolol yesterday. Today he feels nauseauted and is passing blood in stools. Please advise.

## 2018-09-07 NOTE — TELEPHONE ENCOUNTER
----- Message from Lynne Mtz sent at 9/7/2018  1:18 PM CDT -----  Contact: spouse, [phone]  States patient started taking metoprolol prescribed and he feels nauseauted and is passing blood in stools. Please advise.

## 2018-09-07 NOTE — TELEPHONE ENCOUNTER
----- Message from Lynne Mtz sent at 9/7/2018  1:18 PM CDT -----  Contact: spouse, [phone]  States patient started taking metoprolol prescribed and he feels nauseauted and is passing blood in stools. Please advise.

## 2018-09-07 NOTE — TELEPHONE ENCOUNTER
I spoke with patient's wife.  She states that patient had noted blood in stool X 2 this am.  1st episode was minor but 2nd episode patient saw lots of blood in toilet. Patient denied confusion, SOB, chest pain, coughing up blood, and yellow of skin/eyes.  Patient ordered to report to ER.

## 2018-09-12 ENCOUNTER — TELEPHONE (OUTPATIENT)
Dept: HEPATOLOGY | Facility: CLINIC | Age: 58
End: 2018-09-12

## 2018-09-12 ENCOUNTER — CLINICAL SUPPORT (OUTPATIENT)
Dept: INFECTIOUS DISEASES | Facility: CLINIC | Age: 58
End: 2018-09-12
Payer: MEDICARE

## 2018-09-12 DIAGNOSIS — Z23 NEED FOR HEPATITIS VACCINATION: ICD-10-CM

## 2018-09-12 PROCEDURE — 99212 OFFICE O/P EST SF 10 MIN: CPT | Mod: PBBFAC

## 2018-09-12 PROCEDURE — 99999 PR PBB SHADOW E&M-EST. PATIENT-LVL II: CPT | Mod: PBBFAC,,,

## 2018-09-12 PROCEDURE — G0010 ADMIN HEPATITIS B VACCINE: HCPCS | Mod: PBBFAC

## 2018-09-12 PROCEDURE — 90686 IIV4 VACC NO PRSV 0.5 ML IM: CPT | Mod: PBBFAC

## 2018-09-12 NOTE — TELEPHONE ENCOUNTER
Pt presented to clinic to talk to me  Started harvoni 8/15/18    Since then he developed SVT, has been seen by cardiology & prescribed metoprolol.    Has also had some lower abd pain w/ episode of rectal bleeding last week. Saw red blood in bowl. Since then has had drops of blood in tissues when he wipes. No light headedness, dizziness, fatigue.    Was advised to report to ER by my staff last week but he did not go.    Here today to find out if SVR and rectal bleeding are from harvoni.    Advised pt that above events are NOT from Harvoni  Needs to continue f/u w/ cardiology for heart rhythm.  Needs to see GI for rectal bleeding. If rectal bleeding increases he'll need to go to ER.    Pt verbalized understanding

## 2018-09-14 DIAGNOSIS — I47.10 SVT (SUPRAVENTRICULAR TACHYCARDIA): Primary | ICD-10-CM

## 2018-09-21 ENCOUNTER — HOSPITAL ENCOUNTER (EMERGENCY)
Facility: HOSPITAL | Age: 58
Discharge: HOME | End: 2018-09-21
Attending: EMERGENCY MEDICINE
Payer: MEDICARE

## 2018-09-21 VITALS
OXYGEN SATURATION: 94 % | RESPIRATION RATE: 14 BRPM | SYSTOLIC BLOOD PRESSURE: 129 MMHG | DIASTOLIC BLOOD PRESSURE: 74 MMHG | TEMPERATURE: 98 F | WEIGHT: 195 LBS | BODY MASS INDEX: 27.3 KG/M2 | HEART RATE: 61 BPM | HEIGHT: 71 IN

## 2018-09-21 DIAGNOSIS — K92.2 LOWER GI BLEED: Primary | ICD-10-CM

## 2018-09-21 LAB
ALBUMIN SERPL BCP-MCNC: 3.8 G/DL
ALP SERPL-CCNC: 80 U/L
ALT SERPL W/O P-5'-P-CCNC: 23 U/L
ANION GAP SERPL CALC-SCNC: 9 MMOL/L
AST SERPL-CCNC: 24 U/L
BASOPHILS # BLD AUTO: 0.01 K/UL
BASOPHILS NFR BLD: 0.2 %
BILIRUB SERPL-MCNC: 0.4 MG/DL
BUN SERPL-MCNC: 16 MG/DL
CALCIUM SERPL-MCNC: 10.2 MG/DL
CHLORIDE SERPL-SCNC: 103 MMOL/L
CO2 SERPL-SCNC: 25 MMOL/L
CREAT SERPL-MCNC: 0.9 MG/DL
DIFFERENTIAL METHOD: ABNORMAL
EOSINOPHIL # BLD AUTO: 0.1 K/UL
EOSINOPHIL NFR BLD: 1.4 %
ERYTHROCYTE [DISTWIDTH] IN BLOOD BY AUTOMATED COUNT: 12.9 %
EST. GFR  (AFRICAN AMERICAN): >60 ML/MIN/1.73 M^2
EST. GFR  (NON AFRICAN AMERICAN): >60 ML/MIN/1.73 M^2
GLUCOSE SERPL-MCNC: 110 MG/DL
HCT VFR BLD AUTO: 38.1 %
HGB BLD-MCNC: 12.5 G/DL
INR PPP: 1.1
LYMPHOCYTES # BLD AUTO: 2.1 K/UL
LYMPHOCYTES NFR BLD: 37 %
MCH RBC QN AUTO: 28.7 PG
MCHC RBC AUTO-ENTMCNC: 32.8 G/DL
MCV RBC AUTO: 87 FL
MONOCYTES # BLD AUTO: 0.4 K/UL
MONOCYTES NFR BLD: 6.4 %
NEUTROPHILS # BLD AUTO: 3.2 K/UL
NEUTROPHILS NFR BLD: 54.7 %
OB PNL STL: POSITIVE
PLATELET # BLD AUTO: 161 K/UL
PMV BLD AUTO: 10.5 FL
POTASSIUM SERPL-SCNC: 4.8 MMOL/L
PROT SERPL-MCNC: 8.2 G/DL
PROTHROMBIN TIME: 11.4 SEC
RBC # BLD AUTO: 4.36 M/UL
SODIUM SERPL-SCNC: 137 MMOL/L
WBC # BLD AUTO: 5.79 K/UL

## 2018-09-21 PROCEDURE — 85610 PROTHROMBIN TIME: CPT

## 2018-09-21 PROCEDURE — 82272 OCCULT BLD FECES 1-3 TESTS: CPT

## 2018-09-21 PROCEDURE — 96374 THER/PROPH/DIAG INJ IV PUSH: CPT | Mod: 59

## 2018-09-21 PROCEDURE — 80053 COMPREHEN METABOLIC PANEL: CPT

## 2018-09-21 PROCEDURE — 85025 COMPLETE CBC W/AUTO DIFF WBC: CPT

## 2018-09-21 PROCEDURE — 99284 EMERGENCY DEPT VISIT MOD MDM: CPT | Mod: 25

## 2018-09-21 PROCEDURE — 63600175 PHARM REV CODE 636 W HCPCS: Performed by: EMERGENCY MEDICINE

## 2018-09-21 PROCEDURE — 25500020 PHARM REV CODE 255: Performed by: EMERGENCY MEDICINE

## 2018-09-21 RX ORDER — MORPHINE SULFATE 4 MG/ML
4 INJECTION, SOLUTION INTRAMUSCULAR; INTRAVENOUS
Status: COMPLETED | OUTPATIENT
Start: 2018-09-21 | End: 2018-09-21

## 2018-09-21 RX ADMIN — MORPHINE SULFATE 4 MG: 4 INJECTION INTRAVENOUS at 03:09

## 2018-09-21 RX ADMIN — IOHEXOL 100 ML: 350 INJECTION, SOLUTION INTRAVENOUS at 02:09
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
## 2018-09-21 NOTE — ED NOTES
APPEARANCE: Alert, oriented and in no acute distress.  CARDIAC: Normal rate and rhythm, no murmur heard.   PERIPHERAL VASCULAR: peripheral pulses present. Normal cap refill. No edema. Warm to touch.    RESPIRATORY:Normal rate and effort, breath sounds clear bilaterally throughout chest. Respirations are equal and unlabored no obvious signs of distress.  GASTRO: soft, bowel sounds normal, no tenderness, no abdominal distention. Report of bright red rectal blooding.   MUSC: Full ROM. No bony tenderness or soft tissue tenderness. No obvious deformity.  SKIN: Skin is warm and dry, normal skin turgor, mucous membranes moist.  NEURO: Jose Daniel coma scale: eyes open spontaneously-4, oriented & converses-5, obeys commands-6. No neurological abnormalities.   MENTAL STATUS: awake, alert and aware of environment.

## 2018-09-21 NOTE — ED TRIAGE NOTES
"Pt presents to we with c/o rectal bleeding intermittently for last 3 weeks. Reports that when he has a bm that sometimes there's blood in the toilet & sometimes it's just on the toilet paper." Denies N/V/D. Reports taking exlax daily for a robert time. Denies cough, cold, congestion.   "

## 2018-09-25 ENCOUNTER — LAB VISIT (OUTPATIENT)
Dept: LAB | Facility: HOSPITAL | Age: 58
End: 2018-09-25
Attending: PHYSICIAN ASSISTANT
Payer: MEDICARE

## 2018-09-25 DIAGNOSIS — B18.2 CHRONIC HEPATITIS C WITHOUT HEPATIC COMA: ICD-10-CM

## 2018-09-25 DIAGNOSIS — K74.60 CIRRHOSIS OF LIVER WITHOUT ASCITES, UNSPECIFIED HEPATIC CIRRHOSIS TYPE: ICD-10-CM

## 2018-09-25 LAB
ALBUMIN SERPL BCP-MCNC: 3.7 G/DL
ALP SERPL-CCNC: 96 U/L
ALT SERPL W/O P-5'-P-CCNC: 25 U/L
ANION GAP SERPL CALC-SCNC: 9 MMOL/L
AST SERPL-CCNC: 24 U/L
BILIRUB SERPL-MCNC: 0.4 MG/DL
BUN SERPL-MCNC: 16 MG/DL
CALCIUM SERPL-MCNC: 10.7 MG/DL
CHLORIDE SERPL-SCNC: 103 MMOL/L
CO2 SERPL-SCNC: 26 MMOL/L
CREAT SERPL-MCNC: 1 MG/DL
EST. GFR  (AFRICAN AMERICAN): >60 ML/MIN/1.73 M^2
EST. GFR  (NON AFRICAN AMERICAN): >60 ML/MIN/1.73 M^2
GLUCOSE SERPL-MCNC: 143 MG/DL
POTASSIUM SERPL-SCNC: 4.5 MMOL/L
PROT SERPL-MCNC: 8.3 G/DL
SODIUM SERPL-SCNC: 138 MMOL/L

## 2018-09-25 PROCEDURE — 80053 COMPREHEN METABOLIC PANEL: CPT

## 2018-09-25 PROCEDURE — 87522 HEPATITIS C REVRS TRNSCRPJ: CPT

## 2018-09-26 ENCOUNTER — EPISODE CHANGES (OUTPATIENT)
Dept: HEPATOLOGY | Facility: CLINIC | Age: 58
End: 2018-09-26

## 2018-09-26 ENCOUNTER — OFFICE VISIT (OUTPATIENT)
Dept: NEUROLOGY | Facility: HOSPITAL | Age: 58
End: 2018-09-26
Attending: INTERNAL MEDICINE
Payer: MEDICARE

## 2018-09-26 VITALS
BODY MASS INDEX: 27.72 KG/M2 | HEIGHT: 71 IN | WEIGHT: 198 LBS | SYSTOLIC BLOOD PRESSURE: 128 MMHG | HEART RATE: 82 BPM | TEMPERATURE: 98 F | DIASTOLIC BLOOD PRESSURE: 78 MMHG

## 2018-09-26 DIAGNOSIS — K62.5 RECTAL BLEEDING: Primary | ICD-10-CM

## 2018-09-26 PROCEDURE — 99214 OFFICE O/P EST MOD 30 MIN: CPT | Performed by: INTERNAL MEDICINE

## 2018-09-26 RX ORDER — POLYETHYLENE GLYCOL 3350, SODIUM SULFATE, SODIUM CHLORIDE, POTASSIUM CHLORIDE, SODIUM ASCORBATE, AND ASCORBIC ACID 7.5-2.691G
2000 KIT ORAL ONCE
Qty: 2000 ML | Refills: 0 | Status: SHIPPED | OUTPATIENT
Start: 2018-09-26 | End: 2018-09-26

## 2018-09-26 NOTE — PATIENT INSTRUCTIONS
"MOVIPREP Instructions    You are scheduled for a colonoscopy with Dr. De La Cruz on Monday, November 5, 2018 at [facility]  To ensure that your test is accurate and complete, you MUST follow these instructions listed below.  If you have any questions, please call our office at [phone].  Plan on being at the hospital for your procedure for 3-4 hours.    1.  Follow a CLEAR LIQUID DIET for the entire day before your scheduled colonoscopy.  This means no solid food the entire day starting when you wake.  You may have as much of the clear liquids as you want throughout the day.   CLEAR LIQUID DIET:   - Avoid Red, Orange, Purple, and/or Blue food coloring   - NO DAIRY   - You can have:  Coffee with sugar (no creamer), tea, water, soda, apple or white grape juice, chicken or beef broth/bouillon (no meat, noodles, or veggies), green/yellow popsicles, green/yellow Jell-O, lemonade.    2.  AT 5 pm the evening before your colonoscopy, EMPTY ONE PACKET OF "A" AND ONE PACKET OF "B" (which is inside your Moviprep box) INTO THE CONTAINER PROVIDED INSIDE THE BOX.  ADD LUKEWARM WATER TO THE TOP LINE OF THE CONTAINER.  MIX WELL TO DISSOLVE, AND THEN DRINK THE MIXTURE OVER THE NEXT HOUR.  This is sometimes easier to drink if this solution is cold, so you can mix the solution a few hours ahead of time and place in the refrigerator prior to drinking.  You have to drink the solution within 24 hours of mixing it.  Do NOT put this solution over ice.  It IS ok to drink with a straw.    3.  The endoscopy department will call you 2 days before your colonoscopy to tell you the exact time to arrive, AND to tell you the exact time to drink the 2nd portion of your prep (which will be FIVE HOURS BEFORE YOUR ARRIVAL TIME).  At this time given to you, EMPTY ONE PACKET OF "A" AND ONE PACKET OF "B" (which is inside your Moviprep box) INTO THE CONTAINER PROVIDED INSIDE THE BOX.  ADD LUKEWARM WATER TO THE TOP LINE OF THE CONTAINER.  MIX " WELL TO DISSOLVE, AND THEN DRINK THE MIXTURE OVER THE NEXT HOUR.  This is sometimes easier to drink if this solution is cold, so you can mix the solution a few hours ahead of time and place in the refrigerator prior to drinking.  You have to drink the solution within 24 hours of mixing it.  Do NOT put this solution over ice.  It IS ok to drink with a straw. Once this is complete, you may not have ANYTHING else by mouth!    4.  You must have someone with you to DRIVE YOU HOME since you will be receiving IV sedation for the colonoscopy.    5.  It is ok to take your heart, blood pressure, and seizure medications in the morning of your test with a SIP of water.  Hold other medications until after your procedure.  Do NOT have anything else to eat or drink the morning of your colonoscopy.  It is ok to brush your teeth.    6.  If you are on blood thinners THAT YOU HAVE BEEN INSTRUCTED TO HOLD BY YOUR DOCTOR FOR THIS PROCEDURE, then do NOT take this the morning of your colonoscopy.  Do NOT stop these medications on your own, they must be approved to be held by your doctor.  Your colonoscopy can NOT be done if you are on these medications.  Examples of blood thinners include: Coumadin, Aggrenox, Plavix, Pradaxa, Reapro, Pletal, Xarelto, Ticagrelor, Brilinta, Eliquis, and high dose aspirin (325 mg).  You do not have to stop baby aspirin 81 mg.    7.  IF YOU ARE DIABETIC:  NO INSULIN OR ORAL MEDICATIONS THE MORNING OF THE COLONOSCOPY.  TAKE ONLY HALF THE DOSE OF YOUR INSULIN THE DAY BEFORE THE COLONOSCOPY.  DO NOT TAKE ANY ORAL DIABETIC MEDICATIONS THE DAY BEFORE THE COLONOSCOPY.  IF YOU ARE AN INSULIN DEPENDENT DIABETIC WITH UNSTABLE BLOOD SUGARDS, NOTIFY YOUR PRIMARY CARE PHYSICIAN FOR INSTRUCTIONS.
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
## 2018-09-27 ENCOUNTER — TELEPHONE (OUTPATIENT)
Dept: HEPATOLOGY | Facility: CLINIC | Age: 58
End: 2018-09-27

## 2018-09-27 LAB
HCV LOG: <1.08 LOG (10) IU/ML
HCV RNA QUANT PCR: <12 IU/ML
HCV, QUALITATIVE: NOT DETECTED IU/ML

## 2018-09-27 NOTE — TELEPHONE ENCOUNTER
HCV LAB REVIEW  Week 6 of Chrissy, planning on 24 weeks treatment  Emily 1a, tx naive  Prior HCV rx - IFN?    Pertinent labs:  9/25  CMP stable  HCV neg    pls call pt:  Labs look good. Liver enzymes now normal. HCV is negative.  - Continue Harvoni - 1 pill daily - don't miss any doses.  - any trouble or side effects?    Next labs due   - CBC, INR, CMP, AFP at week 12 - 11/6  - U/S abdomen 11/2018  - CMP at week 18 - 12/18  - CMP, HCV RNA at week 24 - end of treatment - 1/29  - CMP, HCV RNA - SVR6 - 3/12  - CMP, HCV RNA - SVR12 - 4/23

## 2018-10-02 ENCOUNTER — TELEPHONE (OUTPATIENT)
Dept: PHARMACY | Facility: CLINIC | Age: 58
End: 2018-10-02

## 2018-10-02 NOTE — TELEPHONE ENCOUNTER
Attempted to contact patient for refill/follow up Chrissy.  Unable to contact patient - [facility].

## 2018-10-04 NOTE — TELEPHONE ENCOUNTER
Harvoni (3 of 6)  refill confirmed. We will ship Harvoni refill on 10/8 via fedex to arrive on 10/9. $0.00 copay- 004. Confirmed 2 patient identifiers - name and .     Patient has 6 doses of Harvoni remaining and takes it around 7 am daily.  Pt reports minor headache but no other issues.  No missed doses, no new medications, no new allergies or health conditions reported at this time.  All questions answered and addressed to patients satisfaction.  Pt verbalized understanding.

## 2018-10-04 NOTE — TELEPHONE ENCOUNTER
Attempted to contact patient for refill/follow up Chrissy.  Unable to contact patient - [facility].

## 2018-10-30 ENCOUNTER — TELEPHONE (OUTPATIENT)
Dept: PHARMACY | Facility: CLINIC | Age: 58
End: 2018-10-30

## 2018-10-30 NOTE — TELEPHONE ENCOUNTER
Harvoni (4 of 6)  refill confirmed. We will ship Harvoni refill on 10/31 via fedex to arrive on . $0.00 copay- 004. Confirmed 2 patient identifiers - name and .     Patient has 8 doses of Harvoni remaining and takes it around 7 am daily.  Pt reports no side effects at this time.  No missed doses, no new medications, no new allergies or health conditions reported at this time.  All questions answered and addressed to patients satisfaction.  Pt verbalized understanding.

## 2018-11-01 ENCOUNTER — TELEPHONE (OUTPATIENT)
Dept: ENDOSCOPY | Facility: HOSPITAL | Age: 58
End: 2018-11-01

## 2018-11-01 NOTE — TELEPHONE ENCOUNTER
Spoke with patient about arrival time @ 0700.     Prep instructions reviewed: the day before the procedure, follow a clear liquid diet all day, then start the first 1/2 of prep at 5pm and take 2nd 1/2 of prep @ 0200.  Pt must be completely NPO when prep completed @ 0400.    Medications: Do not take Insulin or oral diabetic medications the day of the procedure.  Take as prescribed: heart, seizure and blood pressure medication in the morning with a sip of water (less than an ounce).  Take any breathing medications and bring inhalers to hospital with you Leave all valuables and jewelry at home.     Wear comfortable clothes to procedure to change into hospital gown You cannot drive for 24 hours after your procedure because you will receive sedation for your procedure to make you comfortable.  A ride must be provided at discharge.

## 2018-11-04 ENCOUNTER — NURSE TRIAGE (OUTPATIENT)
Dept: ADMINISTRATIVE | Facility: CLINIC | Age: 58
End: 2018-11-04

## 2018-11-04 NOTE — TELEPHONE ENCOUNTER
Pt called to cancel his scheduled colonoscopy set for 11/5/2018. He states he forgot and ate a lot of solid foods. Sent Dr. De La Cruz a message letting him know of cancellation. Advised pt to call provider in the am to reschedule.   spoke with Dr. Lilo Carrillo (on call gastro), he is messaging Dr. De La Cruz to advise him.

## 2018-11-05 ENCOUNTER — TELEPHONE (OUTPATIENT)
Dept: NEUROLOGY | Facility: HOSPITAL | Age: 58
End: 2018-11-05

## 2018-11-05 NOTE — TELEPHONE ENCOUNTER
----- Message from Winter Mckeon sent at 11/5/2018  3:24 PM CST -----  Contact: Patient  GI:  Patient requesting to reschedule procedure that was cancelled for today.  He can be reached at [phone].  Thank you  abc

## 2018-11-06 ENCOUNTER — HOSPITAL ENCOUNTER (OUTPATIENT)
Dept: RADIOLOGY | Facility: HOSPITAL | Age: 58
Discharge: HOME | End: 2018-11-06
Attending: PHYSICIAN ASSISTANT | Admitting: INTERNAL MEDICINE
Payer: MEDICARE

## 2018-11-06 DIAGNOSIS — B18.2 CHRONIC HEPATITIS C WITHOUT HEPATIC COMA: ICD-10-CM

## 2018-11-06 DIAGNOSIS — K74.60 CIRRHOSIS OF LIVER WITHOUT ASCITES, UNSPECIFIED HEPATIC CIRRHOSIS TYPE: ICD-10-CM

## 2018-11-07 ENCOUNTER — TELEPHONE (OUTPATIENT)
Dept: HEPATOLOGY | Facility: CLINIC | Age: 58
End: 2018-11-07

## 2018-11-07 ENCOUNTER — TELEPHONE (OUTPATIENT)
Dept: NEUROLOGY | Facility: HOSPITAL | Age: 58
End: 2018-11-07

## 2018-11-07 ENCOUNTER — EPISODE CHANGES (OUTPATIENT)
Dept: HEPATOLOGY | Facility: CLINIC | Age: 58
End: 2018-11-07

## 2018-11-07 DIAGNOSIS — K74.60 CIRRHOSIS OF LIVER WITHOUT ASCITES, UNSPECIFIED HEPATIC CIRRHOSIS TYPE: Primary | ICD-10-CM

## 2018-11-07 NOTE — TELEPHONE ENCOUNTER
----- Message from Lisa Puga sent at 11/7/2018  1:20 PM CST -----  Contact: Patient  GI- Patient would like to reschedule a colonoscopy.Call patient at [phone].

## 2018-11-07 NOTE — TELEPHONE ENCOUNTER
"Colonoscopy rescheduled with pt on Thursday, November 29, 2018.  Pt notified he will be contacted by Endoscopy staff 2-3 days prior to procedure with arrival time.  Pt repeated instructions and stated "I have them written also".   "

## 2018-11-07 NOTE — TELEPHONE ENCOUNTER
HCV LAB REVIEW // HCC screening  Week 12 of Chrissy, planning on 24 weeks treatment  Emily 1a, tx naive  Prior HCV rx - IFN?    Pertinent labs:  11/6  CBC, INR stable  CMP -- ,   AFP 5.8      pls call pt:  Liver cancer tumor marker normal  Liver labs show enzymes are elevated. Unclear why b/c they had gone to normal with last lab draw. We need to f/u on this    - Continue Harvoni - 1 pill daily. Has he missed any doses???  - Is he taking any new medicines or herbal products??  - I see he is scheduled for an u/s next week. We can CANCEL this. I was able to look at his recent CT done 9/2018 and this will work for his liver cancer screening for now.       Next appts due   - LFT 11/19 - PLS SCHEDULE  - U/S abdomen 11/2018 -- CANCEL THIS  - CMP at week 18 - 12/18  - CMP, HCV RNA at week 24 - end of treatment - 1/29  - CMP, HCV RNA - SVR6 - 3/12 - NEED TO ADD AFP  - U/S abdomen - 3/2019 - PLS SCHEDULE  - CMP, HCV RNA - SVR12 - 4/23

## 2018-11-08 NOTE — TELEPHONE ENCOUNTER
I s/w pt today he states hes been taking co Q10 for the last month now he hasn;t missed any doses of his Harvoni and his u/s was cancelled and all labs were scheduled for pt to repeat labs again on 11/19. I did tell him to not take the herbal suppliment any longer until he completes his HCV treatment and that michelle patterson let the provider know.  sent to [place] today.

## 2018-11-19 ENCOUNTER — LAB VISIT (OUTPATIENT)
Dept: LAB | Facility: HOSPITAL | Age: 58
End: 2018-11-19
Attending: PHYSICIAN ASSISTANT
Payer: MEDICARE

## 2018-11-19 ENCOUNTER — TELEPHONE (OUTPATIENT)
Dept: HEPATOLOGY | Facility: CLINIC | Age: 58
End: 2018-11-19

## 2018-11-19 DIAGNOSIS — K74.60 CIRRHOSIS OF LIVER WITHOUT ASCITES, UNSPECIFIED HEPATIC CIRRHOSIS TYPE: ICD-10-CM

## 2018-11-19 LAB
ALBUMIN SERPL BCP-MCNC: 4 G/DL
ALP SERPL-CCNC: 90 U/L
ALT SERPL W/O P-5'-P-CCNC: 26 U/L
AST SERPL-CCNC: 26 U/L
BILIRUB DIRECT SERPL-MCNC: 0.2 MG/DL
BILIRUB SERPL-MCNC: 0.5 MG/DL
PROT SERPL-MCNC: 8.3 G/DL

## 2018-11-19 PROCEDURE — 80076 HEPATIC FUNCTION PANEL: CPT | Mod: HCWC

## 2018-11-19 PROCEDURE — 36415 COLL VENOUS BLD VENIPUNCTURE: CPT | Mod: HCWC

## 2018-11-19 NOTE — TELEPHONE ENCOUNTER
11/19 liver panel normal    pls tell pt liver labs from today are now normal  Remain off herbal supplement  Continue harvoni daily  Next labs due 12/18    thanks

## 2018-11-19 NOTE — TELEPHONE ENCOUNTER
I spoke with Ms. Velazquez and msg from PA Scheuermann relayed.  She states that info will be passed on to patient.  Lab draw already scheduled.

## 2018-11-27 ENCOUNTER — TELEPHONE (OUTPATIENT)
Dept: ENDOSCOPY | Facility: HOSPITAL | Age: 58
End: 2018-11-27

## 2018-11-27 ENCOUNTER — TELEPHONE (OUTPATIENT)
Dept: PHARMACY | Facility: CLINIC | Age: 58
End: 2018-11-27

## 2018-11-27 NOTE — TELEPHONE ENCOUNTER
Spoke with patient about arrival time @. 0815 .     Prep instructions reviewed: the day before the procedure, follow a clear liquid diet all day, then start the first 1/2 of prep at 5pm and take 2nd 1/2 of prep @0215      .  Pt must be completely NPO when prep completed @ 0315         .    Medications: Do not take Insulin or oral diabetic medications the day of the procedure.  Take as prescribed: heart, seizure and blood pressure medication in the morning with a sip of water (less than an ounce).  Take any breathing medications and bring inhalers to hospital with you Leave all valuables and jewelry at home.     Wear comfortable clothes to procedure to change into hospital gown You cannot drive for 24 hours after your procedure because you will receive sedation for your procedure to make you comfortable.  A ride must be provided at discharge.

## 2018-11-27 NOTE — TELEPHONE ENCOUNTER
Harvoni (5 of 6)  refill confirmed. We will ship Harvoni refill on  via fedex to arrive on . $0.00 copay- 004. Confirmed 2 patient identifiers - name and .     Patient has 8 doses of Harvoni remaining and takes it around 7am daily.  Pt reports they are not having any side effects so far.  No missed doses, no new medications, no new allergies or health conditions reported at this time.  All questions answered and addressed to patients satisfaction.  Pt verbalized understanding.

## 2018-11-28 ENCOUNTER — ANESTHESIA EVENT (OUTPATIENT)
Dept: ENDOSCOPY | Facility: HOSPITAL | Age: 58
End: 2018-11-28
Payer: MEDICARE

## 2018-11-29 ENCOUNTER — EPISODE CHANGES (OUTPATIENT)
Dept: HEPATOLOGY | Facility: CLINIC | Age: 58
End: 2018-11-29

## 2018-11-29 ENCOUNTER — ANESTHESIA (OUTPATIENT)
Dept: ENDOSCOPY | Facility: HOSPITAL | Age: 58
End: 2018-11-29
Payer: MEDICARE

## 2018-11-29 ENCOUNTER — HOSPITAL ENCOUNTER (OUTPATIENT)
Facility: HOSPITAL | Age: 58
Discharge: HOME | End: 2018-11-29
Attending: INTERNAL MEDICINE | Admitting: INTERNAL MEDICINE
Payer: MEDICARE

## 2018-11-29 DIAGNOSIS — K62.5 RECTAL BLEEDING: Primary | ICD-10-CM

## 2018-11-29 DIAGNOSIS — K63.5 POLYP OF COLON, UNSPECIFIED PART OF COLON, UNSPECIFIED TYPE: ICD-10-CM

## 2018-11-29 PROCEDURE — 45385 COLONOSCOPY W/LESION REMOVAL: CPT | Mod: HCWC | Performed by: INTERNAL MEDICINE

## 2018-11-29 PROCEDURE — 37000009 HC ANESTHESIA EA ADD 15 MINS: Mod: HCWC | Performed by: INTERNAL MEDICINE

## 2018-11-29 PROCEDURE — 45381 COLONOSCOPY SUBMUCOUS NJX: CPT | Performed by: INTERNAL MEDICINE

## 2018-11-29 PROCEDURE — 88305 TISSUE EXAM BY PATHOLOGIST: CPT | Mod: 26,HCWC,, | Performed by: PATHOLOGY

## 2018-11-29 PROCEDURE — 27201089 HC SNARE, DISP (ANY): Mod: HCWC | Performed by: INTERNAL MEDICINE

## 2018-11-29 PROCEDURE — 25000003 PHARM REV CODE 250: Mod: HCWC | Performed by: INTERNAL MEDICINE

## 2018-11-29 PROCEDURE — 27201028 HC NEEDLE, SCLERO: Mod: HCWC | Performed by: INTERNAL MEDICINE

## 2018-11-29 PROCEDURE — 37000008 HC ANESTHESIA 1ST 15 MINUTES: Mod: HCWC | Performed by: INTERNAL MEDICINE

## 2018-11-29 PROCEDURE — 45390 COLONOSCOPY W/RESECTION: CPT | Mod: HCWC | Performed by: INTERNAL MEDICINE

## 2018-11-29 PROCEDURE — 63600175 PHARM REV CODE 636 W HCPCS: Mod: HCWC | Performed by: NURSE ANESTHETIST, CERTIFIED REGISTERED

## 2018-11-29 PROCEDURE — 27201012 HC FORCEPS, HOT/COLD, DISP: Mod: HCWC | Performed by: INTERNAL MEDICINE

## 2018-11-29 PROCEDURE — 88305 TISSUE EXAM BY PATHOLOGIST: CPT | Mod: HCWC | Performed by: PATHOLOGY

## 2018-11-29 PROCEDURE — 45385 COLONOSCOPY W/LESION REMOVAL: CPT

## 2018-11-29 PROCEDURE — 45380 COLONOSCOPY AND BIOPSY: CPT | Mod: HCWC,59 | Performed by: INTERNAL MEDICINE

## 2018-11-29 RX ORDER — SODIUM CHLORIDE 9 MG/ML
INJECTION, SOLUTION INTRAVENOUS CONTINUOUS
Status: DISCONTINUED | OUTPATIENT
Start: 2018-11-29 | End: 2018-11-29 | Stop reason: HOSPADM

## 2018-11-29 RX ORDER — SODIUM CHLORIDE 9 MG/ML
INJECTION, SOLUTION INTRAVENOUS CONTINUOUS
Status: DISCONTINUED | OUTPATIENT
Start: 2018-11-29 | End: 2019-07-23

## 2018-11-29 RX ORDER — PROPOFOL 10 MG/ML
VIAL (ML) INTRAVENOUS CONTINUOUS PRN
Status: DISCONTINUED | OUTPATIENT
Start: 2018-11-29 | End: 2018-11-29

## 2018-11-29 RX ORDER — SODIUM CHLORIDE 0.9 % (FLUSH) 0.9 %
3 SYRINGE (ML) INJECTION
Status: DISCONTINUED | OUTPATIENT
Start: 2018-11-29 | End: 2019-07-23

## 2018-11-29 RX ORDER — LIDOCAINE HCL/PF 100 MG/5ML
SYRINGE (ML) INTRAVENOUS
Status: DISCONTINUED | OUTPATIENT
Start: 2018-11-29 | End: 2018-11-29

## 2018-11-29 RX ORDER — SODIUM CHLORIDE 0.9 % (FLUSH) 0.9 %
3 SYRINGE (ML) INJECTION
Status: DISCONTINUED | OUTPATIENT
Start: 2018-11-29 | End: 2018-11-29 | Stop reason: HOSPADM

## 2018-11-29 RX ORDER — PROPOFOL 10 MG/ML
VIAL (ML) INTRAVENOUS
Status: DISCONTINUED | OUTPATIENT
Start: 2018-11-29 | End: 2018-11-29

## 2018-11-29 RX ADMIN — PROPOFOL 80 MG: 10 INJECTION, EMULSION INTRAVENOUS at 09:11

## 2018-11-29 RX ADMIN — SODIUM CHLORIDE: 0.9 INJECTION, SOLUTION INTRAVENOUS at 09:11

## 2018-11-29 RX ADMIN — PROPOFOL 200 MCG/KG/MIN: 10 INJECTION, EMULSION INTRAVENOUS at 09:11

## 2018-11-29 RX ADMIN — LIDOCAINE HYDROCHLORIDE 40 MG: 20 INJECTION, SOLUTION INTRAVENOUS at 09:11
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
## 2018-11-29 NOTE — [employer] PATIENT INSTRUCTIONS
Discharge Summary/Instructions after an Endoscopic Procedure  Patient Name: Lucio Mendoza  Patient MRN: [id number]  Patient YOB: 1960  Thursday, November 29, 2018  Ezio De La Cruz MD  RESTRICTIONS:  During your procedure today, you received medications for sedation.  These   medications may affect your judgment, balance and coordination.  Therefore,   for 24 hours, you have the following restrictions:   - DO NOT drive a car, operate machinery, make legal/financial decisions,   sign important papers or drink alcohol.    ACTIVITY:  Today: no heavy lifting, straining or running due to procedural   sedation/anesthesia.  The following day: return to full activity including work.  DIET:  Eat and drink normally unless instructed otherwise.     TREATMENT FOR COMMON SIDE EFFECTS:  - Mild abdominal pain, nausea, belching, bloating or excessive gas:  rest,   eat lightly and use a heating pad.  - Sore Throat: treat with throat lozenges and/or gargle with warm salt   water.  - Because air was used during the procedure, expelling large amounts of air   from your rectum or belching is normal.  - If a bowel prep was taken, you may not have a bowel movement for 1-3 days.    This is normal.  SYMPTOMS TO WATCH FOR AND REPORT TO YOUR PHYSICIAN:  1. Abdominal pain or bloating, other than gas cramps.  2. Chest pain.  3. Back pain.  4. Signs of infection such as: chills or fever occurring within 24 hours   after the procedure.  5. Rectal bleeding, which would show as bright red, maroon, or black stools.   (A tablespoon of blood from the rectum is not serious, especially if   hemorrhoids are present.)  6. Vomiting.  7. Weakness or dizziness.  GO DIRECTLY TO THE NEAREST EMERGENCY ROOM IF YOU HAVE ANY OF THE FOLLOWING:      Difficulty breathing              Chills and/or fever over 101 F   Persistent vomiting and/or vomiting blood   Severe abdominal pain   Severe chest pain   Black, tarry stools   Bleeding- more than one  tablespoon   Any other symptom or condition that you feel may need urgent attention  Your doctor recommends these additional instructions:  If any biopsies were taken, your doctors clinic will contact you in 1 to 2   weeks with any results.  Repeat colonoscopy in 3 years for surveillance.  Discharge to home   Condition stable   Resume previous diet   The signs and symptoms of potential delayed complications were discussed   with the patient. If signs or symptoms of these complications develop, call   the [facility] at [phone].   Return to normal activities tomorrow.  Written discharge instructions were   provided to the patient.   For questions, problems or results please call your physician - Ezio De La Cruz MD at Work:  [phone].  EMERGENCY PHONE NUMBER: [phone],  LAB RESULTS: [phone]  IF A COMPLICATION OR EMERGENCY SITUATION ARISES AND YOU ARE UNABLE TO REACH   YOUR PHYSICIAN - GO DIRECTLY TO THE EMERGENCY ROOM.  MD Ezio Boss MD  11/29/2018 11:25:14 AM  This report has been verified and signed electronically.  [employer]

## 2018-11-29 NOTE — TRANSFER OF CARE
"Anesthesia Transfer of Care Note    Patient: Lucio Mendoza    Procedure(s) Performed: Procedure(s) (LRB):  COLONOSCOPY (N/A)    Patient location: GI    Anesthesia Type: MAC    Transport from OR: Transported from OR on room air with adequate spontaneous ventilation    Post pain: adequate analgesia    Post assessment: no apparent anesthetic complications and tolerated procedure well    Post vital signs: stable    Level of consciousness: awake, alert and oriented    Nausea/Vomiting: no nausea/vomiting    Complications: none    Transfer of care protocol was followed      Last vitals:   Visit Vitals  /72   Pulse 69   Temp 37.1 °C (98.7 °F) (Temporal)   Resp (!) 180   Ht 5' 11" (1.803 m)   Wt 86.2 kg (190 lb)   SpO2 99%   BMI 26.50 kg/m²     "
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
## 2018-11-29 NOTE — ANESTHESIA POSTPROCEDURE EVALUATION
"Anesthesia Post Evaluation    Patient: Lucio Mendoza    Procedure(s) Performed: Procedure(s) (LRB):  COLONOSCOPY (N/A)    Final Anesthesia Type: MAC  Patient location during evaluation: GI PACU  Patient participation: Yes- Able to Participate  Level of consciousness: awake and alert  Post-procedure vital signs: reviewed and stable  Pain management: adequate  Airway patency: patent  PONV status at discharge: No PONV  Anesthetic complications: no      Cardiovascular status: hemodynamically stable and blood pressure returned to baseline  Respiratory status: room air, unassisted and spontaneous ventilation  Hydration status: euvolemic  Follow-up not needed.        Visit Vitals  BP (!) 125/57   Pulse (!) 55   Temp 37.1 °C (98.7 °F) (Temporal)   Resp 18   Ht 5' 11" (1.803 m)   Wt 86.2 kg (190 lb)   SpO2 96%   BMI 26.50 kg/m²       Pain/Radha Score: Pain Assessment Performed: Yes (11/29/2018 11:20 AM)  Presence of Pain: denies (11/29/2018 11:20 AM)  Radha Score: 10 (11/29/2018 11:20 AM)        "

## 2018-11-30 VITALS
TEMPERATURE: 99 F | SYSTOLIC BLOOD PRESSURE: 125 MMHG | BODY MASS INDEX: 26.6 KG/M2 | HEIGHT: 71 IN | RESPIRATION RATE: 18 BRPM | OXYGEN SATURATION: 96 % | HEART RATE: 55 BPM | WEIGHT: 190 LBS | DIASTOLIC BLOOD PRESSURE: 57 MMHG

## 2018-12-03 ENCOUNTER — OFFICE VISIT (OUTPATIENT)
Dept: SURGERY | Facility: CLINIC | Age: 58
End: 2018-12-03
Payer: MEDICARE

## 2018-12-03 VITALS
WEIGHT: 196 LBS | DIASTOLIC BLOOD PRESSURE: 76 MMHG | SYSTOLIC BLOOD PRESSURE: 120 MMHG | HEART RATE: 69 BPM | HEIGHT: 71 IN | BODY MASS INDEX: 27.44 KG/M2 | TEMPERATURE: 98 F

## 2018-12-03 DIAGNOSIS — K80.20 CALCULUS OF GALLBLADDER WITHOUT CHOLECYSTITIS WITHOUT OBSTRUCTION: Primary | ICD-10-CM

## 2018-12-03 PROCEDURE — 99214 OFFICE O/P EST MOD 30 MIN: CPT | Mod: HCWC,S$GLB,, | Performed by: STUDENT IN AN ORGANIZED HEALTH CARE EDUCATION/TRAINING PROGRAM

## 2018-12-03 PROCEDURE — 99999 PR PBB SHADOW E&M-EST. PATIENT-LVL III: CPT | Mod: PBBFAC,HCWC,, | Performed by: STUDENT IN AN ORGANIZED HEALTH CARE EDUCATION/TRAINING PROGRAM

## 2018-12-03 PROCEDURE — 3008F BODY MASS INDEX DOCD: CPT | Mod: CPTII,HCWC,S$GLB, | Performed by: STUDENT IN AN ORGANIZED HEALTH CARE EDUCATION/TRAINING PROGRAM
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
## 2018-12-13 RX ORDER — TRIAMCINOLONE ACETONIDE 1 MG/G
CREAM TOPICAL
Qty: 80 G | Refills: 1 | Status: SHIPPED | OUTPATIENT
Start: 2018-12-13 | End: 2019-06-28 | Stop reason: SDUPTHER

## 2018-12-14 ENCOUNTER — OFFICE VISIT (OUTPATIENT)
Dept: FAMILY MEDICINE | Facility: CLINIC | Age: 58
End: 2018-12-14
Payer: MEDICARE

## 2018-12-14 VITALS
HEART RATE: 84 BPM | DIASTOLIC BLOOD PRESSURE: 80 MMHG | SYSTOLIC BLOOD PRESSURE: 122 MMHG | BODY MASS INDEX: 27.47 KG/M2 | HEIGHT: 71 IN | TEMPERATURE: 98 F | WEIGHT: 196.19 LBS

## 2018-12-14 DIAGNOSIS — B18.2 CHRONIC HEPATITIS C WITHOUT HEPATIC COMA: ICD-10-CM

## 2018-12-14 DIAGNOSIS — K62.5 RECTAL BLEEDING: Primary | ICD-10-CM

## 2018-12-14 DIAGNOSIS — R10.9 LEFT SIDED ABDOMINAL PAIN: ICD-10-CM

## 2018-12-14 PROCEDURE — 99213 OFFICE O/P EST LOW 20 MIN: CPT | Mod: S$GLB,,, | Performed by: NURSE PRACTITIONER

## 2018-12-14 PROCEDURE — 99499 UNLISTED E&M SERVICE: CPT | Mod: S$GLB,,, | Performed by: NURSE PRACTITIONER

## 2018-12-14 PROCEDURE — 99999 PR PBB SHADOW E&M-EST. PATIENT-LVL III: CPT | Mod: PBBFAC,,, | Performed by: NURSE PRACTITIONER

## 2018-12-14 PROCEDURE — 3008F BODY MASS INDEX DOCD: CPT | Mod: CPTII,S$GLB,, | Performed by: NURSE PRACTITIONER

## 2018-12-14 RX ORDER — GABAPENTIN 300 MG/1
300 CAPSULE ORAL 2 TIMES DAILY
Refills: 3 | COMMUNITY
Start: 2018-12-13 | End: 2019-07-23

## 2018-12-14 RX ORDER — LEVOTHYROXINE SODIUM 25 UG/1
25 TABLET ORAL
COMMUNITY
End: 2019-08-01

## 2018-12-14 RX ORDER — DICLOFENAC EPOLAMINE 0.01 G/1
SYSTEM TOPICAL
Refills: 0 | COMMUNITY
Start: 2018-09-18 | End: 2019-07-23

## 2018-12-14 NOTE — PROGRESS NOTES
"Subjective:      Patient ID: Lucio Mendoza is a 58 y.o. male.    Chief Complaint: Abdominal Pain    HPI   Patient with chronic HCV on Harvoni, cirrhosis, gallbladder calculus, and history of rectal bleeding to clinic with complaint of intermittent shooting pain to left lower abdomen.  He reports this is been occurring at least once daily over the last 3-4 months.  Reports he has not had any nausea, vomiting, diarrhea, constipation.  He reports he does have some abdominal bloating and right sided abdominal pain intermittently as well.  On 12/3/18 he was seen by a surgeon, Dr. Bernardo, about his gallbladder but given his risk factors it was advised he observe this a few months.  Had a colonoscopy with Dr. De La Cruz for his rectal bleeding and abdominal pain was found to have 2 polyps, 1 was adenomatous.  Patient reports after having his colonoscopy his rectal bleeding subsided until yesterday when he noted to have small amount bright red blood on toilet paper after having a normal BM.  Reports this morning he had small amount bright red blood "coating" stool, not in stool.  No blood reported in toilet.  He denies pain with bowel movement and reports that his stool is normal, not hard, large, or soft.  He has not run any fever.  He cannot identify any other exacerbating or mitigating factors.    Review of Systems   Constitutional: Negative for chills, fatigue and fever.   Respiratory: Negative for cough, shortness of breath and wheezing.    Cardiovascular: Negative for chest pain, palpitations and leg swelling.   Gastrointestinal: Positive for abdominal pain and blood in stool. Negative for abdominal distention, constipation, diarrhea, nausea, rectal pain and vomiting.   Skin: Negative for rash and wound.   Neurological: Negative for weakness and numbness.   Psychiatric/Behavioral: The patient is not nervous/anxious.        Objective:     /80   Pulse 84   Temp 97.9 °F (36.6 °C) (Oral)   Ht 5' 11" (1.803 m)   " Wt 89 kg (196 lb 3.2 oz)   BMI 27.36 kg/m²     Physical Exam   Constitutional: He is oriented to person, place, and time. He appears well-developed and well-nourished.   HENT:   Head: Normocephalic.   Eyes: Pupils are equal, round, and reactive to light.   Neck: Normal range of motion. Neck supple.   Cardiovascular: Normal rate, regular rhythm and normal heart sounds.   Pulmonary/Chest: Effort normal and breath sounds normal. No respiratory distress. He has no decreased breath sounds. He has no wheezes. He has no rhonchi. He has no rales.   Abdominal: Bowel sounds are normal. There is hepatomegaly.   Abdomen is not TTP   Neurological: He is alert and oriented to person, place, and time.   Skin: Skin is warm and dry. No rash noted.   Psychiatric: He has a normal mood and affect. His behavior is normal. Judgment and thought content normal.   Vitals reviewed.    Assessment:     1. Rectal bleeding    2. Left sided abdominal pain    3. Chronic hepatitis C without hepatic coma        Plan:     Problem List Items Addressed This Visit        GI    Chronic hepatitis C    Relevant Orders    Ambulatory referral to Gastroenterology    Rectal bleeding - Primary    Relevant Orders    Ambulatory referral to Gastroenterology      Other Visit Diagnoses     Left sided abdominal pain        Relevant Orders    Ambulatory referral to Gastroenterology      Bowel rest advancing to bland diet  Follow-up with Dr. De La Cruz  Report to ER if symptoms persist or worsen    Follow-up if symptoms worsen or fail to improve.        Parts of this note was dictated using voice recognition software. Please excuse any grammatical or typographical errors.

## 2018-12-14 NOTE — PROGRESS NOTES
Patient, Lucio Mendoza (MRN #[id number]), presented with a recent Platelet count less than 150 K/uL consistent with the definition of thrombocytopenia (ICD10 - D69.6).    Platelets   Date Value Ref Range Status   11/06/2018 137 (L) 150 - 350 K/uL Final     The patient's thrombocytopenia was monitored, evaluated, addressed and/or treated. This addendum to the medical record is made on 12/14/2018.

## 2018-12-18 ENCOUNTER — LAB VISIT (OUTPATIENT)
Dept: LAB | Facility: HOSPITAL | Age: 58
End: 2018-12-18
Attending: PHYSICIAN ASSISTANT
Payer: MEDICARE

## 2018-12-18 DIAGNOSIS — K74.60 CIRRHOSIS OF LIVER WITHOUT ASCITES, UNSPECIFIED HEPATIC CIRRHOSIS TYPE: ICD-10-CM

## 2018-12-18 DIAGNOSIS — B18.2 CHRONIC HEPATITIS C WITHOUT HEPATIC COMA: ICD-10-CM

## 2018-12-18 LAB
ALBUMIN SERPL BCP-MCNC: 3.9 G/DL
ALP SERPL-CCNC: 92 U/L
ALT SERPL W/O P-5'-P-CCNC: 23 U/L
ANION GAP SERPL CALC-SCNC: 7 MMOL/L
AST SERPL-CCNC: 23 U/L
BILIRUB SERPL-MCNC: 0.5 MG/DL
BUN SERPL-MCNC: 19 MG/DL
CALCIUM SERPL-MCNC: 10.8 MG/DL
CHLORIDE SERPL-SCNC: 101 MMOL/L
CO2 SERPL-SCNC: 30 MMOL/L
CREAT SERPL-MCNC: 1.1 MG/DL
EST. GFR  (AFRICAN AMERICAN): >60 ML/MIN/1.73 M^2
EST. GFR  (NON AFRICAN AMERICAN): >60 ML/MIN/1.73 M^2
GLUCOSE SERPL-MCNC: 137 MG/DL
POTASSIUM SERPL-SCNC: 4.2 MMOL/L
PROT SERPL-MCNC: 8.5 G/DL
SODIUM SERPL-SCNC: 138 MMOL/L

## 2018-12-18 PROCEDURE — 36415 COLL VENOUS BLD VENIPUNCTURE: CPT

## 2018-12-18 PROCEDURE — 80053 COMPREHEN METABOLIC PANEL: CPT

## 2018-12-19 ENCOUNTER — TELEPHONE (OUTPATIENT)
Dept: HEPATOLOGY | Facility: CLINIC | Age: 58
End: 2018-12-19

## 2018-12-19 NOTE — TELEPHONE ENCOUNTER
HCV LAB REVIEW  Week 18 of Chrissy, planning on 24 weeks treatment  Emily 1a, tx naive  Prior HCV rx - IFN?    Pertinent labs:  12/18  CMP - AST/ALT normal    pls call pt:  Liver labs are now normal again  - Continue Harvoni - 1 pill daily      Next appts due   - CMP, HCV RNA at week 24 - end of treatment - 1/29  - CMP, HCV RNA - SVR6 - 3/12 - NEED TO ADD AFP  - U/S abdomen - 3/2019 - PLS SCHEDULE  - CMP, HCV RNA - SVR12 - 4/23

## 2018-12-24 ENCOUNTER — TELEPHONE (OUTPATIENT)
Dept: PHARMACY | Facility: CLINIC | Age: 58
End: 2018-12-24

## 2018-12-24 NOTE — TELEPHONE ENCOUNTER
Patient called for refill readiness and follow up on Harvoni.  No answer - lvm for call back.     Chilango Palacios, R.Ph., AAHIVP  Clinical Pharmacist, HIV/HCV  [facility]  Phone: [phone]

## 2018-12-27 NOTE — TELEPHONE ENCOUNTER
Refill readiness for Harvoni confirmed with patient; name/ confirmed; no missed doses; no new medications; no side effects noted; address confirmed for  shipment and  delivery    DEBORAH Delgado.Ph., AAHIVP  Clinical Pharmacist, HIV/HCV  [facility]  Phone: [phone]

## 2019-01-29 ENCOUNTER — LAB VISIT (OUTPATIENT)
Dept: LAB | Facility: HOSPITAL | Age: 59
End: 2019-01-29
Attending: PHYSICIAN ASSISTANT
Payer: MEDICARE

## 2019-01-29 DIAGNOSIS — K74.60 CIRRHOSIS OF LIVER WITHOUT ASCITES, UNSPECIFIED HEPATIC CIRRHOSIS TYPE: ICD-10-CM

## 2019-01-29 DIAGNOSIS — B18.2 CHRONIC HEPATITIS C WITHOUT HEPATIC COMA: ICD-10-CM

## 2019-01-29 LAB
ALBUMIN SERPL BCP-MCNC: 3.7 G/DL
ALP SERPL-CCNC: 75 U/L
ALT SERPL W/O P-5'-P-CCNC: 24 U/L
ANION GAP SERPL CALC-SCNC: 9 MMOL/L
AST SERPL-CCNC: 29 U/L
BILIRUB SERPL-MCNC: 0.5 MG/DL
BUN SERPL-MCNC: 21 MG/DL
CALCIUM SERPL-MCNC: 10.3 MG/DL
CHLORIDE SERPL-SCNC: 103 MMOL/L
CO2 SERPL-SCNC: 26 MMOL/L
CREAT SERPL-MCNC: 1 MG/DL
EST. GFR  (AFRICAN AMERICAN): >60 ML/MIN/1.73 M^2
EST. GFR  (NON AFRICAN AMERICAN): >60 ML/MIN/1.73 M^2
GLUCOSE SERPL-MCNC: 98 MG/DL
POTASSIUM SERPL-SCNC: 4.1 MMOL/L
PROT SERPL-MCNC: 8 G/DL
SODIUM SERPL-SCNC: 138 MMOL/L

## 2019-01-29 PROCEDURE — 36415 COLL VENOUS BLD VENIPUNCTURE: CPT | Mod: HCNC

## 2019-01-29 PROCEDURE — 80053 COMPREHEN METABOLIC PANEL: CPT | Mod: HCNC

## 2019-01-29 PROCEDURE — 87522 HEPATITIS C REVRS TRNSCRPJ: CPT | Mod: HCNC

## 2019-02-01 LAB
HCV RNA SERPL NAA+PROBE-LOG IU: <1.08 LOG (10) IU/ML
HCV RNA SERPL QL NAA+PROBE: NOT DETECTED IU/ML
HCV RNA SPEC NAA+PROBE-ACNC: <12 IU/ML

## 2019-02-03 ENCOUNTER — TELEPHONE (OUTPATIENT)
Dept: HEPATOLOGY | Facility: CLINIC | Age: 59
End: 2019-02-03

## 2019-02-04 NOTE — TELEPHONE ENCOUNTER
HCV LAB REVIEW  Week 24 of Chrissy,  END OF TREATMENT  Emily 1a, tx naive  Prior HCV rx - IFN?    Pertinent labs:  1/29  CMP - AST/ALT normal  HCV neg    pls call pt:  Liver labs normal. HCV negative  Patient should be finished HCV treatment now.   There is a small chance the virus can return over the next 3 months. We will monitor blood for this.      Next appts due   - CMP, HCV RNA - SVR6 - 3/12 - NEED TO ADD AFP  - U/S abdomen - 3/2019 - PLS SCHEDULE  - CMP, HCV RNA - SVR12 - 4/23

## 2019-02-05 ENCOUNTER — EPISODE CHANGES (OUTPATIENT)
Dept: HEPATOLOGY | Facility: CLINIC | Age: 59
End: 2019-02-05

## 2019-02-05 ENCOUNTER — TELEPHONE (OUTPATIENT)
Dept: PHARMACY | Facility: CLINIC | Age: 59
End: 2019-02-05

## 2019-02-05 NOTE — TELEPHONE ENCOUNTER
Attempted to contact patient for QOL assessment Chrissy.  Unable to contact patient - [facility].

## 2019-02-19 ENCOUNTER — CLINICAL SUPPORT (OUTPATIENT)
Dept: INFECTIOUS DISEASES | Facility: CLINIC | Age: 59
End: 2019-02-19
Payer: MEDICARE

## 2019-02-19 DIAGNOSIS — Z23 NEED FOR HEPATITIS VACCINATION: ICD-10-CM

## 2019-02-19 PROCEDURE — 90746 HEPB VACCINE 3 DOSE ADULT IM: CPT | Mod: HCNC,S$GLB,, | Performed by: INTERNAL MEDICINE

## 2019-02-19 PROCEDURE — 90746 HEPATITIS B VACCINE ADULT IM: ICD-10-PCS | Mod: HCNC,S$GLB,, | Performed by: INTERNAL MEDICINE

## 2019-02-19 PROCEDURE — G0010 ADMIN HEPATITIS B VACCINE: HCPCS | Mod: HCNC,S$GLB,, | Performed by: INTERNAL MEDICINE

## 2019-02-19 PROCEDURE — G0010 HEPATITIS B VACCINE ADULT IM: ICD-10-PCS | Mod: HCNC,S$GLB,, | Performed by: INTERNAL MEDICINE

## 2019-03-12 ENCOUNTER — HOSPITAL ENCOUNTER (OUTPATIENT)
Dept: RADIOLOGY | Facility: HOSPITAL | Age: 59
Discharge: HOME | End: 2019-03-12
Attending: PHYSICIAN ASSISTANT
Payer: MEDICARE

## 2019-03-12 PROCEDURE — 76705 ECHO EXAM OF ABDOMEN: CPT | Mod: TC,HCNC

## 2019-03-12 PROCEDURE — 76705 ECHO EXAM OF ABDOMEN: CPT | Mod: 26,HCNC,, | Performed by: RADIOLOGY

## 2019-03-12 PROCEDURE — 76705 US ABDOMEN LIMITED: ICD-10-PCS | Mod: 26,HCNC,, | Performed by: RADIOLOGY

## 2019-03-15 ENCOUNTER — TELEPHONE (OUTPATIENT)
Dept: HEPATOLOGY | Facility: CLINIC | Age: 59
End: 2019-03-15

## 2019-03-15 DIAGNOSIS — K74.60 CIRRHOSIS OF LIVER WITHOUT ASCITES, UNSPECIFIED HEPATIC CIRRHOSIS TYPE: Primary | ICD-10-CM

## 2019-03-15 NOTE — TELEPHONE ENCOUNTER
HCV LAB REVIEW // HCC screening  Pt completed 24 weeks of Harvoni, 1/29/19  Emily 1a, tx naive  Prior HCV rx - IFN?    Pertinent labs:  3/12  CMP, INR stable  AFP normal  HCV neg - SVR6    U/s 3/2019 - no liver lesions    pls call pt:  · Liver labs normal. Tumor marker is normal. U/S looked fine.  · HCV negative. There is a small chance the virus can return over the next 6 weeks. We will monitor blood for this.  · Next liver cancer screening due 9/2019. This will be set up in general liver clinic (instead of HCV clinic) as long as HCV stays negative.      Next appts due   - CMP, HCV RNA - SVR12 - 4/23    pls schedule CBC, CMP, INR, AFP, HCV RNA, U/S, EP VISIT (General hepatology w/ Alivia or Shirlene) - 9/2019

## 2019-03-18 NOTE — TELEPHONE ENCOUNTER
Attempt made to reach pt unsuccessful lvm. PA Scheuermann msg relayed via mail. Labs scheduled as instructed. Reminder notices mailed as well.

## 2019-04-23 ENCOUNTER — LAB VISIT (OUTPATIENT)
Dept: LAB | Facility: HOSPITAL | Age: 59
End: 2019-04-23
Attending: PHYSICIAN ASSISTANT
Payer: MEDICARE

## 2019-04-23 DIAGNOSIS — K74.60 CIRRHOSIS OF LIVER WITHOUT ASCITES, UNSPECIFIED HEPATIC CIRRHOSIS TYPE: ICD-10-CM

## 2019-04-23 DIAGNOSIS — B18.2 CHRONIC HEPATITIS C WITHOUT HEPATIC COMA: ICD-10-CM

## 2019-04-23 LAB
ALBUMIN SERPL BCP-MCNC: 4.1 G/DL (ref 3.5–5.2)
ALP SERPL-CCNC: 85 U/L (ref 55–135)
ALT SERPL W/O P-5'-P-CCNC: 20 U/L (ref 10–44)
ANION GAP SERPL CALC-SCNC: 5 MMOL/L (ref 8–16)
AST SERPL-CCNC: 29 U/L (ref 10–40)
BILIRUB SERPL-MCNC: 0.5 MG/DL (ref 0.1–1)
BUN SERPL-MCNC: 16 MG/DL (ref 8–23)
CALCIUM SERPL-MCNC: 11.5 MG/DL (ref 8.7–10.5)
CHLORIDE SERPL-SCNC: 100 MMOL/L (ref 95–110)
CO2 SERPL-SCNC: 31 MMOL/L (ref 23–29)
CREAT SERPL-MCNC: 1.1 MG/DL (ref 0.5–1.4)
EST. GFR  (AFRICAN AMERICAN): >60 ML/MIN/1.73 M^2
EST. GFR  (NON AFRICAN AMERICAN): >60 ML/MIN/1.73 M^2
GLUCOSE SERPL-MCNC: 113 MG/DL (ref 70–110)
POTASSIUM SERPL-SCNC: 4.6 MMOL/L (ref 3.5–5.1)
PROT SERPL-MCNC: 8.6 G/DL (ref 6–8.4)
SODIUM SERPL-SCNC: 136 MMOL/L (ref 136–145)

## 2019-04-23 PROCEDURE — 80053 COMPREHEN METABOLIC PANEL: CPT | Mod: HCNC

## 2019-04-23 PROCEDURE — 87522 HEPATITIS C REVRS TRNSCRPJ: CPT | Mod: HCNC

## 2019-04-23 PROCEDURE — 36415 COLL VENOUS BLD VENIPUNCTURE: CPT | Mod: HCNC

## 2019-04-26 ENCOUNTER — TELEPHONE (OUTPATIENT)
Dept: HEPATOLOGY | Facility: CLINIC | Age: 59
End: 2019-04-26

## 2019-04-26 DIAGNOSIS — Z86.19 HISTORY OF HEPATITIS C: ICD-10-CM

## 2019-04-26 NOTE — TELEPHONE ENCOUNTER
HCV LAB REVIEW   Pt completed 24 weeks of Harvoni, 1/29/19  Emily 1a, tx naive  Prior HCV rx - IFN?    Pertinent labs:  4/23  CMP stable  HCV neg     These labs document SVR12 following successful HCV treatment with Harvoni    pls tell pt:  · HCV is negative still. This means HCV is cured!!  We do not expect virus to return.  · This does not give protection from HCV, and he could be infected again if he is ever exposed to the virus again.  · Cirrhosis is still present and patient needs liver monitoring and liver cancer screening every 6 months for the rest of his life. This is due again in 9/2019  · Since HCV is no longer present, future visits will be in General Liver clinic instead of HCV clinic.     Next appts:  CBC, CMP, INR, AFP, HCV RNA, U/S, EP VISIT (General hepatology w/ Alivia or Shirlene) - 9/2019  **Please make sure HCV is scheduled w/ these labs. It doesn't look like it is linked right now. thanks

## 2019-04-26 NOTE — TELEPHONE ENCOUNTER
Attempted to contact pt by phone. No answer. VM left. Awaiting call back. PA Scheuermann msg relayed via mail. HCV RNA lab link to lab orders on 10/2019.

## 2019-06-28 RX ORDER — TRIAMCINOLONE ACETONIDE 1 MG/G
CREAM TOPICAL
Qty: 80 G | Refills: 1 | Status: SHIPPED | OUTPATIENT
Start: 2019-06-28 | End: 2019-10-21 | Stop reason: DRUGHIGH

## 2019-07-23 ENCOUNTER — OFFICE VISIT (OUTPATIENT)
Dept: FAMILY MEDICINE | Facility: CLINIC | Age: 59
End: 2019-07-23
Payer: MEDICARE

## 2019-07-23 ENCOUNTER — LAB VISIT (OUTPATIENT)
Dept: LAB | Facility: HOSPITAL | Age: 59
End: 2019-07-23
Attending: FAMILY MEDICINE
Payer: MEDICARE

## 2019-07-23 VITALS
HEIGHT: 71 IN | TEMPERATURE: 98 F | WEIGHT: 208.81 LBS | SYSTOLIC BLOOD PRESSURE: 136 MMHG | HEART RATE: 69 BPM | DIASTOLIC BLOOD PRESSURE: 87 MMHG | BODY MASS INDEX: 29.23 KG/M2

## 2019-07-23 DIAGNOSIS — R39.15 URINARY URGENCY: ICD-10-CM

## 2019-07-23 DIAGNOSIS — E21.0 PRIMARY HYPERPARATHYROIDISM: ICD-10-CM

## 2019-07-23 DIAGNOSIS — N40.1 BENIGN PROSTATIC HYPERPLASIA WITH LOWER URINARY TRACT SYMPTOMS, SYMPTOM DETAILS UNSPECIFIED: ICD-10-CM

## 2019-07-23 DIAGNOSIS — K74.60 CIRRHOSIS OF LIVER WITHOUT ASCITES, UNSPECIFIED HEPATIC CIRRHOSIS TYPE: ICD-10-CM

## 2019-07-23 DIAGNOSIS — Z86.19 HISTORY OF HEPATITIS C: ICD-10-CM

## 2019-07-23 DIAGNOSIS — F33.9 RECURRENT MAJOR DEPRESSIVE DISORDER, REMISSION STATUS UNSPECIFIED: ICD-10-CM

## 2019-07-23 DIAGNOSIS — Z12.5 SCREENING FOR PROSTATE CANCER: ICD-10-CM

## 2019-07-23 DIAGNOSIS — E83.52 HYPERCALCEMIA: ICD-10-CM

## 2019-07-23 DIAGNOSIS — F41.9 ANXIETY: ICD-10-CM

## 2019-07-23 DIAGNOSIS — N39.44 BED WETTING: Primary | ICD-10-CM

## 2019-07-23 LAB
ANION GAP SERPL CALC-SCNC: 6 MMOL/L (ref 8–16)
BILIRUB UR QL STRIP: NEGATIVE
BUN SERPL-MCNC: 18 MG/DL (ref 8–23)
CALCIUM SERPL-MCNC: 12.1 MG/DL (ref 8.7–10.5)
CHLORIDE SERPL-SCNC: 99 MMOL/L (ref 95–110)
CLARITY UR: CLEAR
CO2 SERPL-SCNC: 31 MMOL/L (ref 23–29)
COLOR UR: NORMAL
COMPLEXED PSA SERPL-MCNC: 2 NG/ML (ref 0–4)
CREAT SERPL-MCNC: 1.2 MG/DL (ref 0.5–1.4)
EST. GFR  (AFRICAN AMERICAN): >60 ML/MIN/1.73 M^2
EST. GFR  (NON AFRICAN AMERICAN): >60 ML/MIN/1.73 M^2
GLUCOSE SERPL-MCNC: 86 MG/DL (ref 70–110)
GLUCOSE UR QL STRIP: NEGATIVE
HGB UR QL STRIP: NEGATIVE
KETONES UR QL STRIP: NEGATIVE
LEUKOCYTE ESTERASE UR QL STRIP: NEGATIVE
NITRITE UR QL STRIP: NEGATIVE
PH UR STRIP: 6 [PH] (ref 5–8)
POTASSIUM SERPL-SCNC: 5.4 MMOL/L (ref 3.5–5.1)
PROT UR QL STRIP: NEGATIVE
SODIUM SERPL-SCNC: 136 MMOL/L (ref 136–145)
SP GR UR STRIP: 1.01 (ref 1–1.03)
URN SPEC COLLECT METH UR: NORMAL

## 2019-07-23 PROCEDURE — 3079F PR MOST RECENT DIASTOLIC BLOOD PRESSURE 80-89 MM HG: ICD-10-PCS | Mod: HCNC,CPTII,S$GLB, | Performed by: FAMILY MEDICINE

## 2019-07-23 PROCEDURE — 99999 PR PBB SHADOW E&M-EST. PATIENT-LVL III: CPT | Mod: PBBFAC,HCNC,, | Performed by: FAMILY MEDICINE

## 2019-07-23 PROCEDURE — 36415 COLL VENOUS BLD VENIPUNCTURE: CPT | Mod: HCNC,PO

## 2019-07-23 PROCEDURE — 81002 URINALYSIS NONAUTO W/O SCOPE: CPT | Mod: HCNC,PO

## 2019-07-23 PROCEDURE — 99999 PR PBB SHADOW E&M-EST. PATIENT-LVL III: ICD-10-PCS | Mod: PBBFAC,HCNC,, | Performed by: FAMILY MEDICINE

## 2019-07-23 PROCEDURE — 84153 ASSAY OF PSA TOTAL: CPT | Mod: HCNC

## 2019-07-23 PROCEDURE — 3075F PR MOST RECENT SYSTOLIC BLOOD PRESS GE 130-139MM HG: ICD-10-PCS | Mod: HCNC,CPTII,S$GLB, | Performed by: FAMILY MEDICINE

## 2019-07-23 PROCEDURE — 3079F DIAST BP 80-89 MM HG: CPT | Mod: HCNC,CPTII,S$GLB, | Performed by: FAMILY MEDICINE

## 2019-07-23 PROCEDURE — 3008F BODY MASS INDEX DOCD: CPT | Mod: HCNC,CPTII,S$GLB, | Performed by: FAMILY MEDICINE

## 2019-07-23 PROCEDURE — 80048 BASIC METABOLIC PNL TOTAL CA: CPT | Mod: HCNC

## 2019-07-23 PROCEDURE — 3008F PR BODY MASS INDEX (BMI) DOCUMENTED: ICD-10-PCS | Mod: HCNC,CPTII,S$GLB, | Performed by: FAMILY MEDICINE

## 2019-07-23 PROCEDURE — 3075F SYST BP GE 130 - 139MM HG: CPT | Mod: HCNC,CPTII,S$GLB, | Performed by: FAMILY MEDICINE

## 2019-07-23 PROCEDURE — 99214 PR OFFICE/OUTPT VISIT, EST, LEVL IV, 30-39 MIN: ICD-10-PCS | Mod: HCNC,S$GLB,, | Performed by: FAMILY MEDICINE

## 2019-07-23 PROCEDURE — 99214 OFFICE O/P EST MOD 30 MIN: CPT | Mod: HCNC,S$GLB,, | Performed by: FAMILY MEDICINE

## 2019-07-23 RX ORDER — CLONAZEPAM 1 MG/1
1 TABLET ORAL 3 TIMES DAILY
COMMUNITY
End: 2019-10-21 | Stop reason: DRUGHIGH

## 2019-07-23 RX ORDER — DESMOPRESSIN ACETATE 0.2 MG/1
TABLET ORAL NIGHTLY
Refills: 5 | COMMUNITY
Start: 2019-07-06 | End: 2019-09-03

## 2019-07-23 RX ORDER — TAMSULOSIN HYDROCHLORIDE 0.4 MG/1
0.4 CAPSULE ORAL NIGHTLY
Qty: 30 CAPSULE | Refills: 11 | Status: SHIPPED | OUTPATIENT
Start: 2019-07-23 | End: 2020-06-02

## 2019-07-24 DIAGNOSIS — E83.52 HYPERCALCEMIA: ICD-10-CM

## 2019-07-24 DIAGNOSIS — E21.0 PRIMARY HYPERPARATHYROIDISM: ICD-10-CM

## 2019-07-24 DIAGNOSIS — E55.9 VITAMIN D INSUFFICIENCY: ICD-10-CM

## 2019-07-24 RX ORDER — CINACALCET 30 MG/1
30 TABLET, FILM COATED ORAL
Qty: 90 TABLET | Refills: 0 | Status: CANCELLED | OUTPATIENT
Start: 2019-07-24 | End: 2019-10-22

## 2019-07-24 RX ORDER — ERGOCALCIFEROL 1.25 MG/1
50000 CAPSULE ORAL
Qty: 12 CAPSULE | Refills: 0 | Status: CANCELLED | OUTPATIENT
Start: 2019-07-24
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
## 2019-07-24 NOTE — TELEPHONE ENCOUNTER
He needs a new prescription called in, he was not taking the medicine.      Calcium was elevated.  Potassium was mildly elevated.  Protein was mildly elevated.  I suspect that the elevated calcium may be causing some of his urinary symptoms.  He is supposed to be taking Sensipar (cinacalcet) 30 mg once daily with breakfast.  I suspect that he is not taking this because the last prescription was sent by the endocrinologist more than a year ago.  If he has not been taking it then recommend start taking it every day.  We can send a new prescription until he sees the Endocrine doctor.  If he has been taking it regularly then we would need to increase the dose.  Please check with patient on this.  In either case schedule repeat BMP and PTH next week. My nurse will contact you to arrange.   Thanks,   Dr. Chaudhari

## 2019-07-24 NOTE — TELEPHONE ENCOUNTER
Let us have him check PTH and a vitamin-D level now prior to sending in the prescription in case we need to change something.  Have him check this now and I will hold off sending the prescription for now.

## 2019-07-25 ENCOUNTER — LAB VISIT (OUTPATIENT)
Dept: LAB | Facility: HOSPITAL | Age: 59
End: 2019-07-25
Attending: FAMILY MEDICINE
Payer: MEDICARE

## 2019-07-25 DIAGNOSIS — E83.52 HYPERCALCEMIA: ICD-10-CM

## 2019-07-25 DIAGNOSIS — E55.9 VITAMIN D INSUFFICIENCY: ICD-10-CM

## 2019-07-25 DIAGNOSIS — E21.0 PRIMARY HYPERPARATHYROIDISM: ICD-10-CM

## 2019-07-25 LAB
25(OH)D3+25(OH)D2 SERPL-MCNC: 19 NG/ML (ref 30–96)
PTH-INTACT SERPL-MCNC: 114 PG/ML (ref 9–77)

## 2019-07-25 PROCEDURE — 83970 ASSAY OF PARATHORMONE: CPT | Mod: HCNC

## 2019-07-25 PROCEDURE — 36415 COLL VENOUS BLD VENIPUNCTURE: CPT | Mod: HCNC,PO

## 2019-07-25 PROCEDURE — 82306 VITAMIN D 25 HYDROXY: CPT | Mod: HCNC

## 2019-07-26 ENCOUNTER — PATIENT MESSAGE (OUTPATIENT)
Dept: FAMILY MEDICINE | Facility: CLINIC | Age: 59
End: 2019-07-26

## 2019-07-26 DIAGNOSIS — E21.0 PRIMARY HYPERPARATHYROIDISM: ICD-10-CM

## 2019-07-26 DIAGNOSIS — E83.52 HYPERCALCEMIA: ICD-10-CM

## 2019-07-26 RX ORDER — CINACALCET 30 MG/1
30 TABLET, FILM COATED ORAL
Qty: 30 TABLET | Refills: 6 | Status: SHIPPED | OUTPATIENT
Start: 2019-07-26 | End: 2019-07-27 | Stop reason: SDUPTHER

## 2019-07-26 RX ORDER — ACETAMINOPHEN 500 MG
1 TABLET ORAL DAILY
Qty: 90 CAPSULE | Refills: 1 | Status: SHIPPED | OUTPATIENT
Start: 2019-07-26

## 2019-07-26 NOTE — TELEPHONE ENCOUNTER
PTH elevated, vitamin-D low.  Recent hypercalcemia noted. Prior hyperparathyroidism.  Please have him start vitamin-D 3 2000 units daily.  Restart Sensipar 30 mg daily.  Prescriptions written, but need pharmacy.  He needs to keep the appointment that he has scheduled with endocrine.  Please get BMP in 2 weeks.

## 2019-07-27 DIAGNOSIS — E21.0 PRIMARY HYPERPARATHYROIDISM: ICD-10-CM

## 2019-07-27 DIAGNOSIS — E83.52 HYPERCALCEMIA: ICD-10-CM

## 2019-07-29 ENCOUNTER — TELEPHONE (OUTPATIENT)
Dept: FAMILY MEDICINE | Facility: CLINIC | Age: 59
End: 2019-07-29

## 2019-07-29 RX ORDER — CINACALCET HYDROCHLORIDE 30 MG/1
TABLET, COATED ORAL
Qty: 30 TABLET | Refills: 6 | Status: SHIPPED | OUTPATIENT
Start: 2019-07-29 | End: 2019-09-03

## 2019-07-29 NOTE — TELEPHONE ENCOUNTER
This is what had been prescribed by the endocrinologist previously.  I am not sure what else they would recommend.  I will forward a copy of this to endocrinology Dr. Dunne with whom he has an appointment scheduled for September

## 2019-07-29 NOTE — TELEPHONE ENCOUNTER
----- Message from Mimirowdy Henry sent at 7/29/2019  2:15 PM CDT -----  Type:  Patient Returning Call    Who Called: harley Valdes  Who Left Message for Patient:  Dr Chaudhari' office  Does the patient know what this is regarding?:  results  Would the patient rather a call back or a response via [facility]?   Call back  Best Call Back Number:  [phone]  Additional Information:  Please call again//[email or web address]

## 2019-08-01 ENCOUNTER — OFFICE VISIT (OUTPATIENT)
Dept: FAMILY MEDICINE | Facility: CLINIC | Age: 59
End: 2019-08-01
Payer: MEDICARE

## 2019-08-01 VITALS
BODY MASS INDEX: 30.13 KG/M2 | WEIGHT: 215.19 LBS | HEART RATE: 81 BPM | DIASTOLIC BLOOD PRESSURE: 72 MMHG | SYSTOLIC BLOOD PRESSURE: 130 MMHG | TEMPERATURE: 98 F | HEIGHT: 71 IN | OXYGEN SATURATION: 97 %

## 2019-08-01 DIAGNOSIS — Z00.00 ENCOUNTER FOR PREVENTIVE HEALTH EXAMINATION: Primary | ICD-10-CM

## 2019-08-01 DIAGNOSIS — M54.9 CHRONIC BACK PAIN, UNSPECIFIED BACK LOCATION, UNSPECIFIED BACK PAIN LATERALITY: ICD-10-CM

## 2019-08-01 DIAGNOSIS — I10 HTN (HYPERTENSION), BENIGN: ICD-10-CM

## 2019-08-01 DIAGNOSIS — F41.9 ANXIETY: ICD-10-CM

## 2019-08-01 DIAGNOSIS — K74.60 CIRRHOSIS OF LIVER WITHOUT ASCITES, UNSPECIFIED HEPATIC CIRRHOSIS TYPE: ICD-10-CM

## 2019-08-01 DIAGNOSIS — K74.60 ESOPHAGEAL VARICES IN CIRRHOSIS: ICD-10-CM

## 2019-08-01 DIAGNOSIS — I85.10 ESOPHAGEAL VARICES IN CIRRHOSIS: ICD-10-CM

## 2019-08-01 DIAGNOSIS — E66.9 OBESITY (BMI 30.0-34.9): ICD-10-CM

## 2019-08-01 DIAGNOSIS — I47.10 SVT (SUPRAVENTRICULAR TACHYCARDIA): ICD-10-CM

## 2019-08-01 DIAGNOSIS — E21.0 PRIMARY HYPERPARATHYROIDISM: ICD-10-CM

## 2019-08-01 DIAGNOSIS — F33.41 RECURRENT MAJOR DEPRESSIVE DISORDER, IN PARTIAL REMISSION: ICD-10-CM

## 2019-08-01 DIAGNOSIS — G89.29 CHRONIC BACK PAIN, UNSPECIFIED BACK LOCATION, UNSPECIFIED BACK PAIN LATERALITY: ICD-10-CM

## 2019-08-01 PROCEDURE — 99999 PR PBB SHADOW E&M-EST. PATIENT-LVL IV: ICD-10-PCS | Mod: PBBFAC,HCNC,, | Performed by: NURSE PRACTITIONER

## 2019-08-01 PROCEDURE — 99499 RISK ADDL DX/OHS AUDIT: ICD-10-PCS | Mod: S$GLB,,, | Performed by: NURSE PRACTITIONER

## 2019-08-01 PROCEDURE — G0439 PR MEDICARE ANNUAL WELLNESS SUBSEQUENT VISIT: ICD-10-PCS | Mod: HCNC,S$GLB,, | Performed by: NURSE PRACTITIONER

## 2019-08-01 PROCEDURE — 3075F SYST BP GE 130 - 139MM HG: CPT | Mod: HCNC,CPTII,S$GLB, | Performed by: NURSE PRACTITIONER

## 2019-08-01 PROCEDURE — 3078F PR MOST RECENT DIASTOLIC BLOOD PRESSURE < 80 MM HG: ICD-10-PCS | Mod: HCNC,CPTII,S$GLB, | Performed by: NURSE PRACTITIONER

## 2019-08-01 PROCEDURE — 3075F PR MOST RECENT SYSTOLIC BLOOD PRESS GE 130-139MM HG: ICD-10-PCS | Mod: HCNC,CPTII,S$GLB, | Performed by: NURSE PRACTITIONER

## 2019-08-01 PROCEDURE — 99999 PR PBB SHADOW E&M-EST. PATIENT-LVL IV: CPT | Mod: PBBFAC,HCNC,, | Performed by: NURSE PRACTITIONER

## 2019-08-01 PROCEDURE — G0439 PPPS, SUBSEQ VISIT: HCPCS | Mod: HCNC,S$GLB,, | Performed by: NURSE PRACTITIONER

## 2019-08-01 PROCEDURE — 3078F DIAST BP <80 MM HG: CPT | Mod: HCNC,CPTII,S$GLB, | Performed by: NURSE PRACTITIONER

## 2019-08-01 PROCEDURE — 99499 UNLISTED E&M SERVICE: CPT | Mod: S$GLB,,, | Performed by: NURSE PRACTITIONER

## 2019-08-01 RX ORDER — CINACALCET 30 MG/1
1 TABLET, FILM COATED ORAL DAILY
COMMUNITY
Start: 2019-07-29 | End: 2019-08-01 | Stop reason: SDUPTHER

## 2019-08-01 NOTE — PATIENT INSTRUCTIONS
Controlling High Blood Pressure  High blood pressure (hypertension) is often called the silent killer. This is because many people who have it dont know it. High blood pressure is defined as 140/90 mm Hg or higher. Know your blood pressure and remember to check it regularly. Doing so can save your life. Here are some things you can do to help control your blood pressure.    Choose heart-healthy foods  · Select low-salt, low-fat foods. Limit sodium intake to 2,400 mg per day or the amount suggested by your healthcare provider.  · Limit canned, dried, cured, packaged, and fast foods. These can contain a lot of salt.  · Eat 8 to 10 servings of fruits and vegetables every day.  · Choose lean meats, fish, or chicken.  · Eat whole-grain pasta, brown rice, and beans.  · Eat 2 to 3 servings of low-fat or fat-free dairy products.  · Ask your doctor about the DASH eating plan. This plan helps reduce blood pressure.  · When you go to a restaurant, ask that your meal be prepared with no added salt.  Maintain a healthy weight  · Ask your healthcare provider how many calories to eat a day. Then stick to that number.  · Ask your healthcare provider what weight range is healthiest for you. If you are overweight, a weight loss of only 3% to 5% of your body weight can help lower blood pressure. Generally, a good weight loss goal is to lose 10% of your body weight in a year.  · Limit snacks and sweets.  · Get regular exercise.  Get up and get active  · Choose activities you enjoy. Find ones you can do with friends or family. This includes bicycling, dancing, walking, and jogging.  · Park farther away from building entrances.  · Use stairs instead of the elevator.  · When you can, walk or bike instead of driving.  · [place] leaves, garden, or do household repairs.  · Be active at a moderate to vigorous level of physical activity for at least 40 minutes for a minimum of 3 to 4 days a week.   Manage stress  · Make time to relax and  enjoy life. Find time to laugh.  · Communicate your concerns with your loved ones and your healthcare provider.  · Visit with family and friends, and keep up with hobbies.  Limit alcohol and quit smoking  · Men should have no more than 2 drinks per day.  · Women should have no more than 1 drink per day.  · Talk with your healthcare provider about quitting smoking. Smoking significantly increases your risk for heart disease and stroke. Ask your healthcare provider about community smoking cessation programs and other options.  Medicines  If lifestyle changes arent enough, your healthcare provider may prescribe high blood pressure medicine. Take all medicines as prescribed. If you have any questions about your medicines, ask your healthcare provider before stopping or changing them.   Date Last Reviewed: 4/27/2016  © 4859-9896 [employer]. [address] [place] [address], [place] [address]. All rights reserved. This information is not intended as a substitute for professional medical care. Always follow your healthcare professional's instructions.        Counseling and Referral of Other Preventative  (Italic type indicates deductible and co-insurance are waived)    Patient Name: Lucio Mendoza  Today's Date: 8/1/2019    Health Maintenance       Date Due Completion Date    TETANUS VACCINE 03/10/1976 ---    Pneumococcal Vaccine (Medium Risk) (1 of 1 - PPSV23) 03/10/1977 ---    Shingles Vaccine (1 of 2) 03/10/2008 ---    Influenza Vaccine (1) 08/01/2019 9/12/2018    Lipid Panel 10/13/2020 10/13/2015    Colonoscopy 11/29/2023 11/29/2018    Override on 2/4/2014: Done        No orders of the defined types were placed in this encounter.    The following information is provided to all patients.  This information is to help you find resources for any of the problems found today that may be affecting your health:                Living healthy guide: www.[facility].[place].gov      Understanding Diabetes: www.diabetes.org       Eating healthy: www.cdc.gov/healthyweight      [facility] home safety checklist: www.cdc.gov/steadi/patient.html      Agency on Aging: www.goea.[place].gov      Alcoholics anonymous (AA): www.aa.org      Physical Activity: www.amanda.nih.gov/pi9vyrm      Tobacco use: www.quitwithusla.org

## 2019-08-01 NOTE — PROGRESS NOTES
"Lucio Mendoza presented for a  Medicare AWV and comprehensive Health Risk Assessment today. The following components were reviewed and updated:    · Medical history  · Family History  · Social history  · Allergies and Current Medications  · Health Risk Assessment  · Health Maintenance  · Care Team     ** See Completed Assessments for Annual Wellness Visit within the encounter summary.**       The following assessments were completed:  · Living Situation  · CAGE  · Depression Screening  · Timed Get Up and Go  · Whisper Test  · Cognitive Function Screening  · Nutrition Screening  · ADL Screening  · PAQ Screening    Vitals:    08/01/19 1614   BP: 130/72   Pulse: 81   Temp: 98 °F (36.7 °C)   SpO2: 97%   Weight: 97.6 kg (215 lb 2.7 oz)   Height: 5' 11" (1.803 m)     Body mass index is 30.01 kg/m².  Physical Exam   Constitutional: He is oriented to person, place, and time. He appears well-developed and well-nourished.   HENT:   Head: Normocephalic.   Eyes: Pupils are equal, round, and reactive to light. Conjunctivae and EOM are normal.   Neck: Normal range of motion. Neck supple.   Cardiovascular: Normal rate, regular rhythm and intact distal pulses.   Pulmonary/Chest: Effort normal and breath sounds normal.   Abdominal: Soft. Bowel sounds are normal. There is no tenderness.   Musculoskeletal: Normal range of motion.   Neurological: He is alert and oriented to person, place, and time.   Skin: Skin is warm and dry.   Psychiatric: He has a normal mood and affect. His behavior is normal.         Diagnoses and health risks identified today and associated recommendations/orders:    1. Encounter for preventive health examination    2. HTN (hypertension), benign  - Chronic, stable, continue current medication therapy  - Followed by PCP    3. SVT (supraventricular tachycardia)  - Stable, continue current medication therapy  - Followed by PCP    4. Cirrhosis of liver without ascites, unspecified hepatic cirrhosis type  - Chronic, " stable, continue current medication therapy  - Followed by hepatology    5. Esophageal varices in cirrhosis  - Chronic, stable, continue current medication therapy  - Followed by hepatology PCP    6. Primary hyperparathyroidism  - Chronic, stable, continue current medication therapy  - Followed by PCP  - Referred to endocrinology    7. Chronic back pain, unspecified back location, unspecified back pain laterality  - Chronic, stable, continue current medication therapy  - Followed by PCP    8. Recurrent major depressive disorder, in partial remission  - Chronic, stable, continue current medication therapy  - Followed by PCP and psychiatry    9. Anxiety  - Chronic, stable, continue current medication therapy  - Followed by PCP and psychiatry    10. Obesity (BMI 30.0-34.9)  - Weight loss advised. Dietary and exercise counseling done.        Provided Lucio with a 5-10 year written screening schedule and personal prevention plan. Recommendations were developed using the USPSTF age appropriate recommendations. Education, counseling, and referrals were provided as needed. After Visit Summary printed and given to patient which includes a list of additional screenings\tests needed.    Follow up for PCP visit as scheduled and Annual Medicare Wellness visit in 1 year.    CARLI Rios

## 2019-09-03 ENCOUNTER — OFFICE VISIT (OUTPATIENT)
Dept: FAMILY MEDICINE | Facility: CLINIC | Age: 59
End: 2019-09-03
Payer: MEDICARE

## 2019-09-03 VITALS
HEART RATE: 65 BPM | SYSTOLIC BLOOD PRESSURE: 132 MMHG | BODY MASS INDEX: 29.4 KG/M2 | DIASTOLIC BLOOD PRESSURE: 82 MMHG | TEMPERATURE: 98 F | WEIGHT: 210 LBS | HEIGHT: 71 IN

## 2019-09-03 DIAGNOSIS — F41.9 ANXIETY: ICD-10-CM

## 2019-09-03 DIAGNOSIS — E21.0 PRIMARY HYPERPARATHYROIDISM: ICD-10-CM

## 2019-09-03 DIAGNOSIS — E83.52 HYPERCALCEMIA: Primary | ICD-10-CM

## 2019-09-03 DIAGNOSIS — F33.9 RECURRENT MAJOR DEPRESSIVE DISORDER, REMISSION STATUS UNSPECIFIED: ICD-10-CM

## 2019-09-03 DIAGNOSIS — N40.1 BENIGN PROSTATIC HYPERPLASIA WITH LOWER URINARY TRACT SYMPTOMS, SYMPTOM DETAILS UNSPECIFIED: ICD-10-CM

## 2019-09-03 PROCEDURE — G0008 ADMIN INFLUENZA VIRUS VAC: HCPCS | Mod: S$GLB,,, | Performed by: FAMILY MEDICINE

## 2019-09-03 PROCEDURE — 3008F BODY MASS INDEX DOCD: CPT | Mod: CPTII,S$GLB,, | Performed by: FAMILY MEDICINE

## 2019-09-03 PROCEDURE — 90686 IIV4 VACC NO PRSV 0.5 ML IM: CPT | Mod: S$GLB,,, | Performed by: FAMILY MEDICINE

## 2019-09-03 PROCEDURE — 90732 PNEUMOCOCCAL POLYSACCHARIDE VACCINE 23-VALENT =>2YO SQ IM: ICD-10-PCS | Mod: S$GLB,,, | Performed by: FAMILY MEDICINE

## 2019-09-03 PROCEDURE — 99999 PR PBB SHADOW E&M-EST. PATIENT-LVL III: CPT | Mod: PBBFAC,,, | Performed by: FAMILY MEDICINE

## 2019-09-03 PROCEDURE — 99214 PR OFFICE/OUTPT VISIT, EST, LEVL IV, 30-39 MIN: ICD-10-PCS | Mod: 25,S$GLB,, | Performed by: FAMILY MEDICINE

## 2019-09-03 PROCEDURE — 99999 PR PBB SHADOW E&M-EST. PATIENT-LVL III: ICD-10-PCS | Mod: PBBFAC,,, | Performed by: FAMILY MEDICINE

## 2019-09-03 PROCEDURE — 3075F PR MOST RECENT SYSTOLIC BLOOD PRESS GE 130-139MM HG: ICD-10-PCS | Mod: CPTII,S$GLB,, | Performed by: FAMILY MEDICINE

## 2019-09-03 PROCEDURE — G0009 PNEUMOCOCCAL POLYSACCHARIDE VACCINE 23-VALENT =>2YO SQ IM: ICD-10-PCS | Mod: S$GLB,,, | Performed by: FAMILY MEDICINE

## 2019-09-03 PROCEDURE — G0008 FLU VACCINE (QUAD) GREATER THAN OR EQUAL TO 3YO PRESERVATIVE FREE IM: ICD-10-PCS | Mod: S$GLB,,, | Performed by: FAMILY MEDICINE

## 2019-09-03 PROCEDURE — 90732 PPSV23 VACC 2 YRS+ SUBQ/IM: CPT | Mod: S$GLB,,, | Performed by: FAMILY MEDICINE

## 2019-09-03 PROCEDURE — G0009 ADMIN PNEUMOCOCCAL VACCINE: HCPCS | Mod: S$GLB,,, | Performed by: FAMILY MEDICINE

## 2019-09-03 PROCEDURE — 3079F PR MOST RECENT DIASTOLIC BLOOD PRESSURE 80-89 MM HG: ICD-10-PCS | Mod: CPTII,S$GLB,, | Performed by: FAMILY MEDICINE

## 2019-09-03 PROCEDURE — 3075F SYST BP GE 130 - 139MM HG: CPT | Mod: CPTII,S$GLB,, | Performed by: FAMILY MEDICINE

## 2019-09-03 PROCEDURE — 90686 FLU VACCINE (QUAD) GREATER THAN OR EQUAL TO 3YO PRESERVATIVE FREE IM: ICD-10-PCS | Mod: S$GLB,,, | Performed by: FAMILY MEDICINE

## 2019-09-03 PROCEDURE — 3008F PR BODY MASS INDEX (BMI) DOCUMENTED: ICD-10-PCS | Mod: CPTII,S$GLB,, | Performed by: FAMILY MEDICINE

## 2019-09-03 PROCEDURE — 3079F DIAST BP 80-89 MM HG: CPT | Mod: CPTII,S$GLB,, | Performed by: FAMILY MEDICINE

## 2019-09-03 PROCEDURE — 99214 OFFICE O/P EST MOD 30 MIN: CPT | Mod: 25,S$GLB,, | Performed by: FAMILY MEDICINE
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
## 2019-09-20 ENCOUNTER — PATIENT OUTREACH (OUTPATIENT)
Dept: ADMINISTRATIVE | Facility: OTHER | Age: 59
End: 2019-09-20

## 2019-09-24 ENCOUNTER — TELEPHONE (OUTPATIENT)
Dept: HEPATOLOGY | Facility: CLINIC | Age: 59
End: 2019-09-24

## 2019-09-27 ENCOUNTER — PATIENT OUTREACH (OUTPATIENT)
Dept: ADMINISTRATIVE | Facility: OTHER | Age: 59
End: 2019-09-27

## 2019-09-27 ENCOUNTER — HOSPITAL ENCOUNTER (OUTPATIENT)
Dept: RADIOLOGY | Facility: HOSPITAL | Age: 59
Discharge: HOME | End: 2019-09-27
Attending: PHYSICIAN ASSISTANT
Payer: MEDICARE

## 2019-09-27 DIAGNOSIS — K74.60 CIRRHOSIS OF LIVER WITHOUT ASCITES, UNSPECIFIED HEPATIC CIRRHOSIS TYPE: ICD-10-CM

## 2019-09-27 PROCEDURE — 76705 US ABDOMEN LIMITED: ICD-10-PCS | Mod: 26,,, | Performed by: RADIOLOGY

## 2019-09-27 PROCEDURE — 76705 ECHO EXAM OF ABDOMEN: CPT | Mod: 26,,, | Performed by: RADIOLOGY

## 2019-09-27 PROCEDURE — 76705 ECHO EXAM OF ABDOMEN: CPT | Mod: TC

## 2019-09-30 ENCOUNTER — OFFICE VISIT (OUTPATIENT)
Dept: HEPATOLOGY | Facility: CLINIC | Age: 59
End: 2019-09-30
Payer: MEDICARE

## 2019-09-30 VITALS
BODY MASS INDEX: 28.45 KG/M2 | SYSTOLIC BLOOD PRESSURE: 130 MMHG | HEIGHT: 71 IN | OXYGEN SATURATION: 95 % | DIASTOLIC BLOOD PRESSURE: 88 MMHG | WEIGHT: 203.25 LBS | HEART RATE: 103 BPM

## 2019-09-30 DIAGNOSIS — Z86.19 HISTORY OF HEPATITIS C: ICD-10-CM

## 2019-09-30 DIAGNOSIS — K74.60 CIRRHOSIS OF LIVER WITHOUT ASCITES, UNSPECIFIED HEPATIC CIRRHOSIS TYPE: Primary | ICD-10-CM

## 2019-09-30 DIAGNOSIS — I85.10 SECONDARY ESOPHAGEAL VARICES WITHOUT BLEEDING: ICD-10-CM

## 2019-09-30 PROBLEM — K62.5 RECTAL BLEEDING: Status: RESOLVED | Noted: 2018-11-29 | Resolved: 2019-09-30

## 2019-09-30 PROBLEM — R93.2 ABNORMAL CT OF LIVER: Status: RESOLVED | Noted: 2017-02-13 | Resolved: 2019-09-30

## 2019-09-30 PROCEDURE — 3079F PR MOST RECENT DIASTOLIC BLOOD PRESSURE 80-89 MM HG: ICD-10-PCS | Mod: CPTII,S$GLB,, | Performed by: NURSE PRACTITIONER

## 2019-09-30 PROCEDURE — 99999 PR PBB SHADOW E&M-EST. PATIENT-LVL III: CPT | Mod: PBBFAC,,, | Performed by: NURSE PRACTITIONER

## 2019-09-30 PROCEDURE — 3079F DIAST BP 80-89 MM HG: CPT | Mod: CPTII,S$GLB,, | Performed by: NURSE PRACTITIONER

## 2019-09-30 PROCEDURE — 99214 PR OFFICE/OUTPT VISIT, EST, LEVL IV, 30-39 MIN: ICD-10-PCS | Mod: S$GLB,,, | Performed by: NURSE PRACTITIONER

## 2019-09-30 PROCEDURE — 3008F BODY MASS INDEX DOCD: CPT | Mod: CPTII,S$GLB,, | Performed by: NURSE PRACTITIONER

## 2019-09-30 PROCEDURE — 99214 OFFICE O/P EST MOD 30 MIN: CPT | Mod: S$GLB,,, | Performed by: NURSE PRACTITIONER

## 2019-09-30 PROCEDURE — 99999 PR PBB SHADOW E&M-EST. PATIENT-LVL III: ICD-10-PCS | Mod: PBBFAC,,, | Performed by: NURSE PRACTITIONER

## 2019-09-30 PROCEDURE — 3075F SYST BP GE 130 - 139MM HG: CPT | Mod: CPTII,S$GLB,, | Performed by: NURSE PRACTITIONER

## 2019-09-30 PROCEDURE — 3008F PR BODY MASS INDEX (BMI) DOCUMENTED: ICD-10-PCS | Mod: CPTII,S$GLB,, | Performed by: NURSE PRACTITIONER

## 2019-09-30 PROCEDURE — 3075F PR MOST RECENT SYSTOLIC BLOOD PRESS GE 130-139MM HG: ICD-10-PCS | Mod: CPTII,S$GLB,, | Performed by: NURSE PRACTITIONER

## 2019-09-30 RX ORDER — LACTULOSE 10 G/15ML
20 SOLUTION ORAL DAILY
Qty: 1800 ML | Refills: 5 | Status: SHIPPED | OUTPATIENT
Start: 2019-09-30 | End: 2020-09-25 | Stop reason: SDUPTHER

## 2019-09-30 RX ORDER — OXYCODONE AND ACETAMINOPHEN 10; 325 MG/1; MG/1
325 TABLET ORAL DAILY
Refills: 0 | COMMUNITY
Start: 2019-09-18 | End: 2019-10-21 | Stop reason: SDUPTHER

## 2019-09-30 NOTE — PATIENT INSTRUCTIONS
1. Start lactulose once per day. Let us know if symptoms do not improve.   2. Next labs and ultrasound for liver cancer screening due in 6 months (March).  3. EGD (upper scope) now with Dr. De La Cruz in [place]. Follow-up with GI for abdominal pain.        Signs and symptoms of worsening liver disease include jaundice (yellow skin/eyes), fluid in the belly (ascites), and confusion/disorientation/slowed thought processes due to hepatic encephalopathy (toxins building up from liver problems). You should seek medical attention if any of these things occur. Also, possible bleeding from esophageal varices (blood vessels in the stomach and foodpipe that can burst and cause bleeding). If you have symptoms of vomiting blood, blood in your stool, dark or black stools or vomiting coffee ground vomit, you should go to the emergency room.     Cirrhosis Counseling  -- Strict abstinence from alcohol (includes beer, wine, and/or liquor)  -- Avoid non-steroidal anti-inflammatory drugs (NSAIDs) such as ibuprofen (Motrin, Advil), naproxen (Naprosyn, Aleve)  -- Tylenol/acetaminophen as needed for pain, no more than 2000 mg per day  -- No raw seafood due to the risk of infection  -- Low sodium diet, less than 2000 mg per day   -- High protein diet to prevent muscle mass loss. Can drink protein shakes (Premier Protein is best option because it is very high protein and low sugar) - Ok to use this as nighttime snack to fit it in.   -- Resistance exercises for muscle strength  -- Upper endoscopy every 1-2 years to screen for varices (enlarged blood vessels) in the stomach and esophagus which can burst and cause bleeding  -- Ultrasound every 6 months for liver cancer screening (you are at risk of developing liver cancer due to scar tissue in the liver).

## 2019-10-21 ENCOUNTER — TELEPHONE (OUTPATIENT)
Dept: FAMILY MEDICINE | Facility: CLINIC | Age: 59
End: 2019-10-21

## 2019-10-21 RX ORDER — OXYCODONE AND ACETAMINOPHEN 10; 325 MG/1; MG/1
1 TABLET ORAL EVERY 8 HOURS PRN
COMMUNITY
End: 2020-07-28

## 2019-10-21 RX ORDER — ESCITALOPRAM OXALATE 5 MG/1
5 TABLET ORAL DAILY
COMMUNITY
End: 2019-11-05

## 2019-10-21 RX ORDER — LEVOTHYROXINE SODIUM 25 UG/1
25 TABLET ORAL DAILY
COMMUNITY
End: 2019-10-28 | Stop reason: SDDI

## 2019-10-21 RX ORDER — ALLOPURINOL 100 MG/1
100 TABLET ORAL DAILY
COMMUNITY
End: 2020-06-16

## 2019-10-21 RX ORDER — TRIAMCINOLONE ACETONIDE 0.25 MG/ML
1 LOTION TOPICAL DAILY
COMMUNITY
End: 2020-07-28

## 2019-10-21 RX ORDER — TRAZODONE HYDROCHLORIDE 50 MG/1
50 TABLET ORAL NIGHTLY PRN
COMMUNITY
End: 2019-11-05

## 2019-10-21 RX ORDER — RISPERIDONE 0.5 MG/1
0.5 TABLET ORAL
COMMUNITY
End: 2019-11-05 | Stop reason: SDDI

## 2019-10-21 RX ORDER — CLONAZEPAM 0.5 MG/1
0.25 TABLET ORAL 2 TIMES DAILY
COMMUNITY
End: 2019-11-05 | Stop reason: SDUPTHER

## 2019-10-21 NOTE — TELEPHONE ENCOUNTER
----- Message from Ivan Sharif sent at 10/21/2019  3:01 PM CDT -----  Contact: pt  care giver   Type:  Needs Medical Advice    Who Called: saol   Symptoms (please be specific):   How long has patient had these symptoms:   Pharmacy name and phone #:   Would the patient rather a call back or a response via [facility]? Call   Best Call Back Number:  [phone] (home)  Additional Information:  Caller is requesting a call back from the nurse in regards to the pt needing his medical records that was drop off to the office on 10/19/2019

## 2019-10-23 NOTE — ANESTHESIA POSTPROCEDURE EVALUATION
"Anesthesia Post Evaluation    Patient: Lucio Mendoza    Procedure(s) Performed: Procedure(s) (LRB):  COLONOSCOPY (N/A)    [facility] Anesthesia Post Op Evaluation    Visit Vitals  BP (!) 125/57   Pulse (!) 55   Temp 37.1 °C (98.7 °F) (Temporal)   Resp 18   Ht 5' 11" (1.803 m)   Wt 86.2 kg (190 lb)   SpO2 96%   BMI 26.50 kg/m²       Pain/Radha Score: Pain Assessment Performed: Yes (11/29/2018 11:20 AM)  Presence of Pain: denies (11/29/2018 11:20 AM)  Radha Score: 10 (11/29/2018 11:20 AM)        " Currently on heparin drip  IV metoprolol with holding parameters  Bradycardia noted  Patient was bradycardic and [facility] [place] 2    Cardiology consultation likely secondary to medication/no further bradycardia since yesterday

## 2019-10-28 ENCOUNTER — OFFICE VISIT (OUTPATIENT)
Dept: FAMILY MEDICINE | Facility: CLINIC | Age: 59
End: 2019-10-28
Payer: MEDICARE

## 2019-10-28 VITALS
SYSTOLIC BLOOD PRESSURE: 109 MMHG | WEIGHT: 211.63 LBS | DIASTOLIC BLOOD PRESSURE: 68 MMHG | HEIGHT: 71 IN | BODY MASS INDEX: 29.63 KG/M2 | HEART RATE: 95 BPM | TEMPERATURE: 98 F

## 2019-10-28 DIAGNOSIS — E83.52 HYPERCALCEMIA: ICD-10-CM

## 2019-10-28 DIAGNOSIS — F33.9 RECURRENT MAJOR DEPRESSIVE DISORDER, REMISSION STATUS UNSPECIFIED: Primary | ICD-10-CM

## 2019-10-28 DIAGNOSIS — F41.1 GENERALIZED ANXIETY DISORDER: ICD-10-CM

## 2019-10-28 DIAGNOSIS — E21.0 PRIMARY HYPERPARATHYROIDISM: ICD-10-CM

## 2019-10-28 DIAGNOSIS — R79.89 ELEVATED TSH: ICD-10-CM

## 2019-10-28 PROCEDURE — 99999 PR PBB SHADOW E&M-EST. PATIENT-LVL III: CPT | Mod: PBBFAC,,, | Performed by: FAMILY MEDICINE

## 2019-10-28 PROCEDURE — 3008F PR BODY MASS INDEX (BMI) DOCUMENTED: ICD-10-PCS | Mod: CPTII,S$GLB,, | Performed by: FAMILY MEDICINE

## 2019-10-28 PROCEDURE — 99999 PR PBB SHADOW E&M-EST. PATIENT-LVL III: ICD-10-PCS | Mod: PBBFAC,,, | Performed by: FAMILY MEDICINE

## 2019-10-28 PROCEDURE — 3074F SYST BP LT 130 MM HG: CPT | Mod: CPTII,S$GLB,, | Performed by: FAMILY MEDICINE

## 2019-10-28 PROCEDURE — 99214 PR OFFICE/OUTPT VISIT, EST, LEVL IV, 30-39 MIN: ICD-10-PCS | Mod: S$GLB,,, | Performed by: FAMILY MEDICINE

## 2019-10-28 PROCEDURE — 3008F BODY MASS INDEX DOCD: CPT | Mod: CPTII,S$GLB,, | Performed by: FAMILY MEDICINE

## 2019-10-28 PROCEDURE — 99214 OFFICE O/P EST MOD 30 MIN: CPT | Mod: S$GLB,,, | Performed by: FAMILY MEDICINE

## 2019-10-28 PROCEDURE — 3078F PR MOST RECENT DIASTOLIC BLOOD PRESSURE < 80 MM HG: ICD-10-PCS | Mod: CPTII,S$GLB,, | Performed by: FAMILY MEDICINE

## 2019-10-28 PROCEDURE — 3074F PR MOST RECENT SYSTOLIC BLOOD PRESSURE < 130 MM HG: ICD-10-PCS | Mod: CPTII,S$GLB,, | Performed by: FAMILY MEDICINE

## 2019-10-28 PROCEDURE — 3078F DIAST BP <80 MM HG: CPT | Mod: CPTII,S$GLB,, | Performed by: FAMILY MEDICINE

## 2019-10-28 RX ORDER — IBUPROFEN 800 MG/1
TABLET ORAL
Refills: 1 | COMMUNITY
Start: 2019-10-18

## 2019-10-28 RX ORDER — VENLAFAXINE HYDROCHLORIDE 37.5 MG/1
37.5 CAPSULE, EXTENDED RELEASE ORAL DAILY
COMMUNITY
End: 2019-11-05
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
## 2019-11-05 ENCOUNTER — OFFICE VISIT (OUTPATIENT)
Dept: FAMILY MEDICINE | Facility: CLINIC | Age: 59
End: 2019-11-05
Payer: MEDICARE

## 2019-11-05 ENCOUNTER — LAB VISIT (OUTPATIENT)
Dept: LAB | Facility: HOSPITAL | Age: 59
End: 2019-11-05
Attending: FAMILY MEDICINE
Payer: MEDICARE

## 2019-11-05 VITALS
WEIGHT: 211.63 LBS | DIASTOLIC BLOOD PRESSURE: 70 MMHG | HEIGHT: 71 IN | HEART RATE: 76 BPM | SYSTOLIC BLOOD PRESSURE: 124 MMHG | BODY MASS INDEX: 29.63 KG/M2

## 2019-11-05 DIAGNOSIS — F33.9 RECURRENT MAJOR DEPRESSIVE DISORDER, REMISSION STATUS UNSPECIFIED: ICD-10-CM

## 2019-11-05 DIAGNOSIS — R79.89 ELEVATED TSH: ICD-10-CM

## 2019-11-05 DIAGNOSIS — F41.1 GENERALIZED ANXIETY DISORDER: ICD-10-CM

## 2019-11-05 DIAGNOSIS — E83.52 HYPERCALCEMIA: ICD-10-CM

## 2019-11-05 DIAGNOSIS — E21.0 PRIMARY HYPERPARATHYROIDISM: ICD-10-CM

## 2019-11-05 DIAGNOSIS — F33.9 EPISODE OF RECURRENT MAJOR DEPRESSIVE DISORDER, UNSPECIFIED DEPRESSION EPISODE SEVERITY: ICD-10-CM

## 2019-11-05 DIAGNOSIS — G89.4 CHRONIC PAIN SYNDROME: ICD-10-CM

## 2019-11-05 DIAGNOSIS — R56.9 SEIZURE: Primary | ICD-10-CM

## 2019-11-05 DIAGNOSIS — Z87.820 HISTORY OF TRAUMATIC BRAIN INJURY: ICD-10-CM

## 2019-11-05 LAB
ANION GAP SERPL CALC-SCNC: 6 MMOL/L (ref 8–16)
BUN SERPL-MCNC: 16 MG/DL (ref 8–23)
CALCIUM SERPL-MCNC: 10.7 MG/DL (ref 8.7–10.5)
CHLORIDE SERPL-SCNC: 103 MMOL/L (ref 95–110)
CO2 SERPL-SCNC: 28 MMOL/L (ref 23–29)
CREAT SERPL-MCNC: 1.1 MG/DL (ref 0.5–1.4)
EST. GFR  (AFRICAN AMERICAN): >60 ML/MIN/1.73 M^2
EST. GFR  (NON AFRICAN AMERICAN): >60 ML/MIN/1.73 M^2
GLUCOSE SERPL-MCNC: 95 MG/DL (ref 70–110)
POTASSIUM SERPL-SCNC: 4.6 MMOL/L (ref 3.5–5.1)
PTH-INTACT SERPL-MCNC: 145 PG/ML (ref 9–77)
SODIUM SERPL-SCNC: 137 MMOL/L (ref 136–145)
T4 FREE SERPL-MCNC: 0.69 NG/DL (ref 0.71–1.51)
TSH SERPL DL<=0.005 MIU/L-ACNC: 9.05 UIU/ML (ref 0.4–4)

## 2019-11-05 PROCEDURE — 36415 COLL VENOUS BLD VENIPUNCTURE: CPT | Mod: PO

## 2019-11-05 PROCEDURE — 83970 ASSAY OF PARATHORMONE: CPT

## 2019-11-05 PROCEDURE — 99999 PR PBB SHADOW E&M-EST. PATIENT-LVL III: CPT | Mod: PBBFAC,,, | Performed by: FAMILY MEDICINE

## 2019-11-05 PROCEDURE — 99999 PR PBB SHADOW E&M-EST. PATIENT-LVL III: ICD-10-PCS | Mod: PBBFAC,,, | Performed by: FAMILY MEDICINE

## 2019-11-05 PROCEDURE — 84443 ASSAY THYROID STIM HORMONE: CPT

## 2019-11-05 PROCEDURE — 3074F PR MOST RECENT SYSTOLIC BLOOD PRESSURE < 130 MM HG: ICD-10-PCS | Mod: CPTII,S$GLB,, | Performed by: FAMILY MEDICINE

## 2019-11-05 PROCEDURE — 3008F PR BODY MASS INDEX (BMI) DOCUMENTED: ICD-10-PCS | Mod: CPTII,S$GLB,, | Performed by: FAMILY MEDICINE

## 2019-11-05 PROCEDURE — 86376 MICROSOMAL ANTIBODY EACH: CPT

## 2019-11-05 PROCEDURE — 3078F PR MOST RECENT DIASTOLIC BLOOD PRESSURE < 80 MM HG: ICD-10-PCS | Mod: CPTII,S$GLB,, | Performed by: FAMILY MEDICINE

## 2019-11-05 PROCEDURE — 3074F SYST BP LT 130 MM HG: CPT | Mod: CPTII,S$GLB,, | Performed by: FAMILY MEDICINE

## 2019-11-05 PROCEDURE — 84439 ASSAY OF FREE THYROXINE: CPT

## 2019-11-05 PROCEDURE — 3008F BODY MASS INDEX DOCD: CPT | Mod: CPTII,S$GLB,, | Performed by: FAMILY MEDICINE

## 2019-11-05 PROCEDURE — 80048 BASIC METABOLIC PNL TOTAL CA: CPT

## 2019-11-05 PROCEDURE — 3078F DIAST BP <80 MM HG: CPT | Mod: CPTII,S$GLB,, | Performed by: FAMILY MEDICINE

## 2019-11-05 PROCEDURE — 99214 OFFICE O/P EST MOD 30 MIN: CPT | Mod: S$GLB,,, | Performed by: FAMILY MEDICINE

## 2019-11-05 PROCEDURE — 99214 PR OFFICE/OUTPT VISIT, EST, LEVL IV, 30-39 MIN: ICD-10-PCS | Mod: S$GLB,,, | Performed by: FAMILY MEDICINE

## 2019-11-05 RX ORDER — VENLAFAXINE HYDROCHLORIDE 75 MG/1
75 CAPSULE, EXTENDED RELEASE ORAL DAILY
Qty: 30 CAPSULE | Refills: 1 | Status: SHIPPED | OUTPATIENT
Start: 2019-11-05 | End: 2019-11-29 | Stop reason: SDUPTHER

## 2019-11-05 RX ORDER — ZOLPIDEM TARTRATE 10 MG/1
5 TABLET ORAL NIGHTLY
COMMUNITY
End: 2020-07-28

## 2019-11-05 RX ORDER — GABAPENTIN 300 MG/1
300 CAPSULE ORAL 2 TIMES DAILY
COMMUNITY
End: 2020-03-09

## 2019-11-05 RX ORDER — ALPRAZOLAM 1 MG/1
0.5 TABLET ORAL 2 TIMES DAILY PRN
COMMUNITY
Start: 2019-11-05 | End: 2020-07-28

## 2019-11-05 RX ORDER — CLONAZEPAM 1 MG/1
1 TABLET ORAL 3 TIMES DAILY
COMMUNITY
Start: 2019-10-29 | End: 2020-07-28

## 2019-11-05 RX ORDER — ALPRAZOLAM 1 MG/1
1 TABLET ORAL 2 TIMES DAILY PRN
COMMUNITY
Start: 2019-11-04 | End: 2019-11-05
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
## 2019-11-05 NOTE — PATIENT INSTRUCTIONS
Recommend decrease the alprazolam 1mg to 1/2 tablet twice a day as needed and taper down as tolerated

## 2019-11-06 LAB — THYROPEROXIDASE IGG SERPL-ACNC: <6 IU/ML

## 2019-11-08 DIAGNOSIS — E03.9 HYPOTHYROIDISM, UNSPECIFIED TYPE: Primary | ICD-10-CM

## 2019-11-08 NOTE — TELEPHONE ENCOUNTER
----- Message from Marvin Chaudhari MD sent at 11/6/2019  4:17 PM CST -----  Thyroid is low.  Parathyroid level elevated similar previous checks.  Calcium elevated similar to previous checks.  Recommend follow-up with endocrinologist regarding hyperparathyroidism as scheduled.  Recommend start levothyroxine 50 mcg daily for low thyroid.  Recheck TSH in 6 weeks.. My nurse will contact you to arrange.  Thanks,  Dr. Chaduhari

## 2019-11-11 RX ORDER — LEVOTHYROXINE SODIUM 50 UG/1
50 TABLET ORAL DAILY
Qty: 30 TABLET | Refills: 11 | Status: SHIPPED | OUTPATIENT
Start: 2019-11-11 | End: 2020-01-09 | Stop reason: DRUGHIGH

## 2019-11-15 RX ORDER — TRIAMCINOLONE ACETONIDE 1 MG/G
CREAM TOPICAL
Qty: 80 G | Refills: 1 | Status: SHIPPED | OUTPATIENT
Start: 2019-11-15 | End: 2020-01-10

## 2019-11-29 RX ORDER — VENLAFAXINE HYDROCHLORIDE 75 MG/1
75 CAPSULE, EXTENDED RELEASE ORAL DAILY
Qty: 30 CAPSULE | Refills: 5 | Status: SHIPPED | OUTPATIENT
Start: 2019-11-29 | End: 2020-06-16

## 2020-01-02 ENCOUNTER — LAB VISIT (OUTPATIENT)
Dept: LAB | Facility: HOSPITAL | Age: 60
End: 2020-01-02
Attending: FAMILY MEDICINE
Payer: MEDICARE

## 2020-01-02 DIAGNOSIS — E03.9 HYPOTHYROIDISM, UNSPECIFIED TYPE: ICD-10-CM

## 2020-01-02 LAB
T4 FREE SERPL-MCNC: 0.74 NG/DL (ref 0.71–1.51)
TSH SERPL DL<=0.005 MIU/L-ACNC: 7.8 UIU/ML (ref 0.4–4)

## 2020-01-02 PROCEDURE — 84439 ASSAY OF FREE THYROXINE: CPT

## 2020-01-02 PROCEDURE — 36415 COLL VENOUS BLD VENIPUNCTURE: CPT

## 2020-01-02 PROCEDURE — 84443 ASSAY THYROID STIM HORMONE: CPT

## 2020-01-09 DIAGNOSIS — E03.9 HYPOTHYROIDISM, UNSPECIFIED TYPE: Primary | ICD-10-CM

## 2020-01-09 RX ORDER — LEVOTHYROXINE SODIUM 100 UG/1
100 TABLET ORAL DAILY
Qty: 30 TABLET | Refills: 5 | Status: SHIPPED | OUTPATIENT
Start: 2020-01-09 | End: 2020-06-02

## 2020-01-09 NOTE — TELEPHONE ENCOUNTER
MD JOANN Tejada. Staff             Thyroid test Shows hypothyroidism..  Please increase Levothyroxine  100 mcg daily.  Recheck TSH in 2 months.

## 2020-01-10 RX ORDER — TRIAMCINOLONE ACETONIDE 1 MG/G
CREAM TOPICAL
Qty: 80 G | Refills: 1 | Status: SHIPPED | OUTPATIENT
Start: 2020-01-10

## 2020-01-26 ENCOUNTER — PATIENT OUTREACH (OUTPATIENT)
Dept: ADMINISTRATIVE | Facility: OTHER | Age: 60
End: 2020-01-26

## 2020-01-27 ENCOUNTER — OFFICE VISIT (OUTPATIENT)
Dept: ENDOCRINOLOGY | Facility: CLINIC | Age: 60
End: 2020-01-27
Payer: MEDICARE

## 2020-01-27 VITALS
RESPIRATION RATE: 18 BRPM | HEIGHT: 71 IN | DIASTOLIC BLOOD PRESSURE: 74 MMHG | HEART RATE: 76 BPM | BODY MASS INDEX: 29.86 KG/M2 | SYSTOLIC BLOOD PRESSURE: 110 MMHG | WEIGHT: 213.31 LBS

## 2020-01-27 DIAGNOSIS — E03.9 HYPOTHYROIDISM, ACQUIRED: ICD-10-CM

## 2020-01-27 DIAGNOSIS — E21.0 PRIMARY HYPERPARATHYROIDISM: Primary | ICD-10-CM

## 2020-01-27 DIAGNOSIS — R32 ENURESIS: ICD-10-CM

## 2020-01-27 PROCEDURE — 3074F SYST BP LT 130 MM HG: CPT | Mod: CPTII,S$GLB,, | Performed by: INTERNAL MEDICINE

## 2020-01-27 PROCEDURE — 3078F PR MOST RECENT DIASTOLIC BLOOD PRESSURE < 80 MM HG: ICD-10-PCS | Mod: CPTII,S$GLB,, | Performed by: INTERNAL MEDICINE

## 2020-01-27 PROCEDURE — 3074F PR MOST RECENT SYSTOLIC BLOOD PRESSURE < 130 MM HG: ICD-10-PCS | Mod: CPTII,S$GLB,, | Performed by: INTERNAL MEDICINE

## 2020-01-27 PROCEDURE — 99999 PR PBB SHADOW E&M-EST. PATIENT-LVL V: ICD-10-PCS | Mod: PBBFAC,,, | Performed by: INTERNAL MEDICINE

## 2020-01-27 PROCEDURE — 3008F PR BODY MASS INDEX (BMI) DOCUMENTED: ICD-10-PCS | Mod: CPTII,S$GLB,, | Performed by: INTERNAL MEDICINE

## 2020-01-27 PROCEDURE — 99214 OFFICE O/P EST MOD 30 MIN: CPT | Mod: S$GLB,,, | Performed by: INTERNAL MEDICINE

## 2020-01-27 PROCEDURE — 3078F DIAST BP <80 MM HG: CPT | Mod: CPTII,S$GLB,, | Performed by: INTERNAL MEDICINE

## 2020-01-27 PROCEDURE — 99999 PR PBB SHADOW E&M-EST. PATIENT-LVL V: CPT | Mod: PBBFAC,,, | Performed by: INTERNAL MEDICINE

## 2020-01-27 PROCEDURE — 3008F BODY MASS INDEX DOCD: CPT | Mod: CPTII,S$GLB,, | Performed by: INTERNAL MEDICINE

## 2020-01-27 PROCEDURE — 99214 PR OFFICE/OUTPT VISIT, EST, LEVL IV, 30-39 MIN: ICD-10-PCS | Mod: S$GLB,,, | Performed by: INTERNAL MEDICINE

## 2020-01-27 NOTE — ASSESSMENT & PLAN NOTE
High serum PTH, high serum calcium, with high urinary calcium is diagnostic of primary hyperparathyroidism.    Discussed surgical indications, including osteoporosis, hypercalciuria/kidney stones, elevated serum Cr. Serum calcium is at the borderline where surgery is indicated. Patient is interested in surgery, so will repeat localization studies (thyroid U/S and NM parathyroid). Referred to endocrine surgery after imaging eval.

## 2020-01-27 NOTE — PROGRESS NOTES
Subjective:      Chief Complaint: Hyperparathyroidism      HPI: Juan Smallwood is a 59 y.o. male who is here for a follow-up evaluation for primary hyperparathyroidism    Patient is new to me and was last seen by JOSE Sandoval in 6/2018    With regards to the hypercalcemia or primary hyperparathyroidism:    Per Last note:  ___  He underwent 4 gland parathyroid exploration in June 2017 by Dr. Masterson   The patient had persistent hyperparathyroidism and normocalcemia postoperatively   At the time reoperation was recommended but the patient opted to defer      He is here today to discuss medical management      Most recent calcium is 11.2 mg/dL ( corrected ) from 05/2018      Of note, he was found to have elevated serum calcium since 2014      Parathyroid work up included:   NM parathyroid scan, which did not show scintigraph evidence of parathyroid adenoma in the neck or mediastinum   Thyroid ultrasound showed 2 areas adjacent to right and left thyroid lobes which could possibly represent a parathyroid adenoma   BMD from 03/2017 noted mild Osteopenia of the femoral neck . FRAX calculation does not support treatment for Osteoporosis       He denies kidney stones   Denies chronic kidney disease   Denies personal hx of falls or fractures   Denies constipation   Denies muscle weakness   Denies nausea or vomiting      He endorses depression and confusion   Currently taking Paxil      24 hour urine for calcium:   Results for JUAN SMALLWOOD (MRN [id number]) as of 2/20/2017 13:57    Ref. Range 12/12/2016 09:12   Calcium, Urine Latest Ref Range: 0.0 - 15.0 mg/dL 12.6   Calcium, 24H Urine Latest Ref Range: 4 - 12 mg/Hr 10   CA Urine (mg/Spec) Latest Units: mg/Spec 234         Patient also has Hep C and is currently being evaluated by Hepatology   ___    He did not localize on thyroid ultrasound or NM parathyroid scan. He underwent 3 1/2 gland subtotal parathyroidectomy in 6/2017. However only one of the specimens submitted  "contained parathyroid material. Intraoperative PTH did not decline either. He was offered a repeat surgery, but initially declined.        Daily intake of calcium is:  Taking vitamin D: 2000 IU daily    Neuro symptoms: yes; has some mental fog and memory issues.  Depression: yes; on Paxil  Mental Fog: yes  Anxiety: yes    Fractures/osteoporosis?: no  >2" height loss: no  Kidney stones?: no  GFR <60?: no  Constipation?: no  Bone pain?: no    HCTZ?: no  Lithium?: no    24H urine calcium?:    Ref. Range 12/12/2016 09:12   Calcium, Urine 0.0 - 15.0 mg/dL 12.6   Calcium, 24H Urine 4 - 12 mg/Hr 10   CA Urine (mg/Spec)  234     Most recent calcium level was 10.7      With regards to hypothyroidism:     Ref. Range 8/21/2018 12:17 7/25/2019 08:12 11/5/2019 15:14 1/2/2020 09:50   TSH 0.4- 4.0 uIU/mL 3.864  9.048 (H) 7.801 (H)   Free T4 0.71 - 1.51 ng/dL   0.69 (L) 0.74   TPO Ab <6.0 IU/mL   <6.0      Currentmedication:  Generic levothyroxine 100 mcg daily - recently increased from 50 mcg daily by PCP.    Takes thyroid medication properly without food first thing in the morning     Current symptoms:   weight gain  Fatigue   Constipation   Hair loss  Brittle nails  Mental fog     Last BMD:    Thyroid U/S 3/2017  Impression       1.) 1.35 x 0.79 x 1.01 solid heterogeneous nodule is seen in the right mid pole that does not meet criteria for FNA biopsy    2.) There are two foci inferior to the right and left thyroid lobes that are most likely enlarged reactive lymph nodes with the presence of a fatty hilum, but could represent parathyroid adenomas    RECOMMENDATIONS:   Recommend correlation with nuclear medicine parathyroid imaging and also recommend repeat thyroid ultrasound in 6 months to monitor reactive-appearing lymph nodes.     With regards to enuresis:  Patient reports frequent night-time awakenings with enuresis. Additionally reports weak stream and straining to urinate occasionally. He has never been evaluated by a " urologist.    Reviewed past medical, family, social history and updated as appropriate.    Review of Systems   Constitutional: Positive for fatigue.   Respiratory: Negative for shortness of breath.    Cardiovascular: Negative for chest pain.   Gastrointestinal: Negative for abdominal pain.   Genitourinary: Positive for difficulty urinating and enuresis.   Neurological:        +mental fog     Objective:     Vitals:    01/27/20 1012   BP: 110/74   Pulse: 76   Resp: 18     BP Readings from Last 5 Encounters:   01/27/20 110/74   11/05/19 124/70   10/28/19 109/68   09/30/19 130/88   09/03/19 132/82       Physical Exam   Constitutional: He is oriented to person, place, and time. He appears well-developed and well-nourished. No distress.   HENT:   Head: Normocephalic and atraumatic.   Eyes: Conjunctivae are normal. Right eye exhibits no discharge. Left eye exhibits no discharge.   Neck: No tracheal deviation present. No thyromegaly present.   Cardiovascular: Normal rate.   Pulmonary/Chest: Effort normal. No respiratory distress.   Musculoskeletal: He exhibits no edema.   No digital clubbing or extremity cyanosis   Neurological: He is alert and oriented to person, place, and time. Coordination normal.   Psychiatric: He has a normal mood and affect. His behavior is normal.   Nursing note and vitals reviewed.      Wt Readings from Last 10 Encounters:   01/27/20 1012 96.7 kg (213 lb 4.7 oz)   11/05/19 1343 96 kg (211 lb 9.6 oz)   10/28/19 1254 96 kg (211 lb 9.6 oz)   09/30/19 1210 92.2 kg (203 lb 4.2 oz)   09/03/19 1545 95.3 kg (210 lb)   08/01/19 1614 97.6 kg (215 lb 2.7 oz)   07/23/19 1332 94.7 kg (208 lb 12.8 oz)   12/14/18 1347 89 kg (196 lb 3.2 oz)   12/03/18 1106 88.9 kg (195 lb 15.8 oz)   11/29/18 0911 86.2 kg (190 lb)       No results found for: HGBA1C  Lab Results   Component Value Date    CHOL 146 01/31/2014    HDL 27 (L) 01/31/2014    LDLCALC 97.8 01/31/2014    TRIG 106 01/31/2014    CHOLHDL 18.5 (L) 01/31/2014      Lab Results   Component Value Date     11/05/2019    K 4.6 11/05/2019     11/05/2019    CO2 28 11/05/2019    GLU 95 11/05/2019    BUN 16 11/05/2019    CREATININE 1.1 11/05/2019    CALCIUM 10.7 (H) 11/05/2019    PROT 8.7 (H) 09/27/2019    ALBUMIN 4.4 09/27/2019    BILITOT 0.6 09/27/2019    ALKPHOS 63 09/27/2019    AST 23 09/27/2019    ALT 29 09/27/2019    ANIONGAP 6 (L) 11/05/2019    ESTGFRAFRICA >60.0 11/05/2019    EGFRNONAA >60.0 11/05/2019    TSH 7.801 (H) 01/02/2020      No results found for: MICALBCREAT    Assessment/Plan:     Hypothyroidism, acquired  Currently on levothyroxine 100 mcg daily. Unclear etiology. TSH repeat in March scheduled per PCP.    Primary hyperparathyroidism  High serum PTH, high serum calcium, with high urinary calcium is diagnostic of primary hyperparathyroidism.    Discussed surgical indications, including osteoporosis, hypercalciuria/kidney stones, elevated serum Cr. Serum calcium is at the borderline where surgery is indicated. Patient is interested in surgery, so will repeat localization studies (thyroid U/S and NM parathyroid). Referred to endocrine surgery after imaging eval.    Enuresis  Referred to urology.    RTC after parathyroid surgery

## 2020-01-27 NOTE — PATIENT INSTRUCTIONS
Understanding Primary Hyperparathyroidism    The parathyroid glands are 4 tiny glands in the neck. They make parathyroid hormone (PTH). PTH controls the amount of calcium and phosphorus in your blood. Primary hyperparathyroidism is when there is too much PTH in your blood. It occurs when one or more of the glands are too active.  The job of PTH is to tell the body how to control calcium. Too much PTH means the body increases the amount of calcium in the blood. This leads to a problem called hypercalcemia. This is when the amount of calcium in the blood is too high. Hypercalcemia can cause serious health problems.  Causes  Hyperparathyroidism can occur when a parathyroid gland becomes enlarged. It can also occur as a complication of another health conditions, such as kidney failure or rickets. In these conditions, calcium is usually not high. This is called secondary hyperparathyroidism.  Whos at risk  The risk factors for this condition include:  · Being a woman (its less common in men)  · Being older (its more likely to occur with age)  · Having parents or siblings with the condition or other endocrine tumors  · Having certain kidney problems  · Taking certain medicines  · Having had radiation treatment in the head or neck  Symptoms  Symptoms of the condition can include:  · Muscle weakness  · Depression  · Tiredness  · Confusion and memory loss  · Poor memory  · Nausea and vomiting  · Pain in the stomach area (abdomen)  · Hard stools (constipation)  · Stomach ulcers  · Need to urinate often  · Kidney stones  · Joint or bone pain  · Bone disease (osteopenia or osteoporosis), an increase in bone fractures  · High blood pressure  · Increased thirst  Treatment  If primary hyperparathyroidism is not treated, it can get worse over time. Treatments include:  · Surgery. This may be done to remove any enlarged parathyroid glands. This lets the amount of calcium in the blood go back to normal. You may need to take  vitamin D and calcium supplements before the surgery. This will reduce the risk of low calcium after the surgery.   · Medicine. This lowers the amount of parathyroid hormone made by the overactive glands.   You and your healthcare provider can discuss your treatment options. Make sure to ask any questions you have.  Date Last Reviewed: 11/1/2016  © 5655-2243 [employer]. [address] [place] [address], [place] [address]. All rights reserved. This information is not intended as a substitute for professional medical care. Always follow your healthcare professional's instructions.

## 2020-01-27 NOTE — ASSESSMENT & PLAN NOTE
Currently on levothyroxine 100 mcg daily. Unclear etiology. TSH repeat in March scheduled per PCP.

## 2020-01-28 ENCOUNTER — OFFICE VISIT (OUTPATIENT)
Dept: UROLOGY | Facility: CLINIC | Age: 60
End: 2020-01-28
Payer: MEDICARE

## 2020-01-28 VITALS — WEIGHT: 215.81 LBS | HEIGHT: 71 IN | BODY MASS INDEX: 30.21 KG/M2

## 2020-01-28 DIAGNOSIS — Z12.5 ENCOUNTER FOR PROSTATE CANCER SCREENING: ICD-10-CM

## 2020-01-28 DIAGNOSIS — R35.1 NOCTURIA: Primary | ICD-10-CM

## 2020-01-28 DIAGNOSIS — R35.0 URINE FREQUENCY: ICD-10-CM

## 2020-01-28 DIAGNOSIS — N39.41 URGE INCONTINENCE: ICD-10-CM

## 2020-01-28 DIAGNOSIS — K59.00 CONSTIPATION, UNSPECIFIED CONSTIPATION TYPE: ICD-10-CM

## 2020-01-28 DIAGNOSIS — R39.15 URINARY URGENCY: ICD-10-CM

## 2020-01-28 LAB
BILIRUB SERPL-MCNC: ABNORMAL MG/DL
BLOOD URINE, POC: ABNORMAL
COLOR, POC UA: YELLOW
GLUCOSE UR QL STRIP: ABNORMAL
KETONES UR QL STRIP: ABNORMAL
LEUKOCYTE ESTERASE URINE, POC: ABNORMAL
NITRITE, POC UA: ABNORMAL
PH, POC UA: 6
POC RESIDUAL URINE VOLUME: 102 ML (ref 0–100)
PROTEIN, POC: ABNORMAL
SPECIFIC GRAVITY, POC UA: 1.01
UROBILINOGEN, POC UA: 1

## 2020-01-28 PROCEDURE — 81002 URINALYSIS NONAUTO W/O SCOPE: CPT | Mod: S$GLB,,, | Performed by: STUDENT IN AN ORGANIZED HEALTH CARE EDUCATION/TRAINING PROGRAM

## 2020-01-28 PROCEDURE — 51798 PR MEAS,POST-VOID RES,US,NON-IMAGING: ICD-10-PCS | Mod: S$GLB,,, | Performed by: STUDENT IN AN ORGANIZED HEALTH CARE EDUCATION/TRAINING PROGRAM

## 2020-01-28 PROCEDURE — 51798 US URINE CAPACITY MEASURE: CPT | Mod: S$GLB,,, | Performed by: STUDENT IN AN ORGANIZED HEALTH CARE EDUCATION/TRAINING PROGRAM

## 2020-01-28 PROCEDURE — 99204 PR OFFICE/OUTPT VISIT, NEW, LEVL IV, 45-59 MIN: ICD-10-PCS | Mod: 25,S$GLB,, | Performed by: STUDENT IN AN ORGANIZED HEALTH CARE EDUCATION/TRAINING PROGRAM

## 2020-01-28 PROCEDURE — 81002 PR URINALYSIS NONAUTO W/O SCOPE: ICD-10-PCS | Mod: S$GLB,,, | Performed by: STUDENT IN AN ORGANIZED HEALTH CARE EDUCATION/TRAINING PROGRAM

## 2020-01-28 PROCEDURE — 51741 ELECTRO-UROFLOWMETRY FIRST: CPT | Mod: S$GLB,,, | Performed by: STUDENT IN AN ORGANIZED HEALTH CARE EDUCATION/TRAINING PROGRAM

## 2020-01-28 PROCEDURE — 99999 PR PBB SHADOW E&M-EST. PATIENT-LVL III: CPT | Mod: PBBFAC,,, | Performed by: STUDENT IN AN ORGANIZED HEALTH CARE EDUCATION/TRAINING PROGRAM

## 2020-01-28 PROCEDURE — 3008F PR BODY MASS INDEX (BMI) DOCUMENTED: ICD-10-PCS | Mod: CPTII,S$GLB,, | Performed by: STUDENT IN AN ORGANIZED HEALTH CARE EDUCATION/TRAINING PROGRAM

## 2020-01-28 PROCEDURE — 99204 OFFICE O/P NEW MOD 45 MIN: CPT | Mod: 25,S$GLB,, | Performed by: STUDENT IN AN ORGANIZED HEALTH CARE EDUCATION/TRAINING PROGRAM

## 2020-01-28 PROCEDURE — 51741 PR UROFLOWMETRY, COMPLEX: ICD-10-PCS | Mod: S$GLB,,, | Performed by: STUDENT IN AN ORGANIZED HEALTH CARE EDUCATION/TRAINING PROGRAM

## 2020-01-28 PROCEDURE — 99999 PR PBB SHADOW E&M-EST. PATIENT-LVL III: ICD-10-PCS | Mod: PBBFAC,,, | Performed by: STUDENT IN AN ORGANIZED HEALTH CARE EDUCATION/TRAINING PROGRAM

## 2020-01-28 PROCEDURE — 3008F BODY MASS INDEX DOCD: CPT | Mod: CPTII,S$GLB,, | Performed by: STUDENT IN AN ORGANIZED HEALTH CARE EDUCATION/TRAINING PROGRAM

## 2020-01-28 RX ORDER — ACETAMINOPHEN 500 MG
2000 TABLET ORAL
COMMUNITY
Start: 2019-07-26 | End: 2020-06-16 | Stop reason: SDUPTHER

## 2020-01-28 RX ORDER — DESMOPRESSIN ACETATE 0.2 MG/1
TABLET ORAL
COMMUNITY
Start: 2020-01-10 | End: 2020-06-16

## 2020-01-28 RX ORDER — PAROXETINE HYDROCHLORIDE 40 MG/1
40 TABLET, FILM COATED ORAL
COMMUNITY
Start: 2020-01-17 | End: 2020-10-07 | Stop reason: ALTCHOICE

## 2020-01-28 NOTE — LETTER
January 28, 2020      Ezio Dunne MD  [address] [facility] [address]           [facility]  [address], [address]  [facility] [address]  Phone: [phone]          Patient: Lucio Mendoza   MR Number: [id number]   YOB: 1960   Date of Visit: 1/28/2020       Dear Dr. Ezio Dunne:    Thank you for referring Lucio Mendoza to me for evaluation. Attached you will find relevant portions of my assessment and plan of care.    If you have questions, please do not hesitate to call me. I look forward to following Lucio Mendoza along with you.    Sincerely,    Tammi Doherty MD    Enclosure  CC:  No Recipients    If you would like to receive this communication electronically, please contact [email or web address] or [phone] to request more information on [facility] access.    For providers and/or their staff who would like to refer a patient to [facility], please contact us through our one-stop-shop provider referral line, [facility], at [phone].    If you feel you have received this communication in error or would no longer like to receive these types of communications, please e-mail [email or web address]
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
## 2020-01-28 NOTE — PATIENT INSTRUCTIONS
1. Decrease hydration 3 hours before bedtime.  2. Avoid bladder irritants including but not limited to caffeine, alcohol, smoking, spicy foods, acidic foods, tomato-based products, citrus, artificial sweeteners, chocolate, coffee or tea.  3. Restart the flomax/tamsulosin. Take every night after dinner before going to bed.  4. Try to aim for one soft bowel movement every day. Ask Dr. Chaudhari for help if needed.

## 2020-01-31 ENCOUNTER — HOSPITAL ENCOUNTER (OUTPATIENT)
Dept: ENDOCRINOLOGY | Facility: CLINIC | Age: 60
Discharge: HOME | End: 2020-01-31
Attending: INTERNAL MEDICINE
Payer: MEDICARE

## 2020-01-31 DIAGNOSIS — E21.0 PRIMARY HYPERPARATHYROIDISM: ICD-10-CM

## 2020-01-31 PROCEDURE — 76536 US EXAM OF HEAD AND NECK: CPT | Mod: S$GLB,,, | Performed by: INTERNAL MEDICINE

## 2020-01-31 PROCEDURE — 76536 US SOFT TISSUE HEAD NECK THYROID: ICD-10-PCS | Mod: S$GLB,,, | Performed by: INTERNAL MEDICINE

## 2020-02-06 ENCOUNTER — HOSPITAL ENCOUNTER (OUTPATIENT)
Dept: RADIOLOGY | Facility: HOSPITAL | Age: 60
Discharge: HOME | End: 2020-02-06
Attending: INTERNAL MEDICINE
Payer: MEDICARE

## 2020-02-06 DIAGNOSIS — E21.0 PRIMARY HYPERPARATHYROIDISM: ICD-10-CM

## 2020-02-27 ENCOUNTER — TELEPHONE (OUTPATIENT)
Dept: SURGERY | Facility: CLINIC | Age: 60
End: 2020-02-27

## 2020-02-27 NOTE — TELEPHONE ENCOUNTER
Spoke with pt. Rescheduled appt to 3/12/20 @ 1 pm.    ----- Message from Mick Velásquez sent at 2/27/2020 11:44 AM CST -----  Contact: Patient  Patient Advice/Staff Message     Caller name: Pt    Reason for call: Pt need to reschedule consult appt    Do you feel you need to be seen today:: No        Communication Preference: [phone]    Additional Information:

## 2020-03-05 ENCOUNTER — HOSPITAL ENCOUNTER (OUTPATIENT)
Dept: RADIOLOGY | Facility: HOSPITAL | Age: 60
Discharge: HOME | End: 2020-03-05
Attending: INTERNAL MEDICINE
Payer: MEDICARE

## 2020-03-05 DIAGNOSIS — E21.0 PRIMARY HYPERPARATHYROIDISM: ICD-10-CM

## 2020-03-05 PROCEDURE — 78072 PARATHYRD PLANAR W/SPECT&CT: CPT | Mod: 26,,, | Performed by: RADIOLOGY

## 2020-03-05 PROCEDURE — 78072 NM PARATHYROID SCAN WITH SPECT AND CT: ICD-10-PCS | Mod: 26,,, | Performed by: RADIOLOGY

## 2020-03-05 PROCEDURE — 78072 PARATHYRD PLANAR W/SPECT&CT: CPT | Mod: TC

## 2020-03-09 RX ORDER — GABAPENTIN 300 MG/1
CAPSULE ORAL
Qty: 60 CAPSULE | Refills: 5 | Status: SHIPPED | OUTPATIENT
Start: 2020-03-09 | End: 2020-07-28 | Stop reason: SDUPTHER

## 2020-03-23 ENCOUNTER — HOSPITAL ENCOUNTER (OUTPATIENT)
Dept: RADIOLOGY | Facility: HOSPITAL | Age: 60
Discharge: HOME | End: 2020-03-23
Attending: NURSE PRACTITIONER
Payer: MEDICARE

## 2020-03-23 ENCOUNTER — TELEPHONE (OUTPATIENT)
Dept: HEPATOLOGY | Facility: CLINIC | Age: 60
End: 2020-03-23

## 2020-03-23 DIAGNOSIS — K74.60 CIRRHOSIS OF LIVER WITHOUT ASCITES, UNSPECIFIED HEPATIC CIRRHOSIS TYPE: ICD-10-CM

## 2020-03-23 PROCEDURE — 76700 US EXAM ABDOM COMPLETE: CPT | Mod: TC

## 2020-03-23 PROCEDURE — 76700 US ABDOMEN COMPLETE: ICD-10-PCS | Mod: 26,,, | Performed by: RADIOLOGY

## 2020-03-23 PROCEDURE — 76700 US EXAM ABDOM COMPLETE: CPT | Mod: 26,,, | Performed by: RADIOLOGY

## 2020-03-23 NOTE — TELEPHONE ENCOUNTER
Called patient to advise him that we aren't seeing patient in clinic. I offered him to sign up for [facility] but he didn't want to. He said that its way to complicated for him. He wants Aminata to call him with results.

## 2020-03-24 ENCOUNTER — TELEPHONE (OUTPATIENT)
Dept: HEPATOLOGY | Facility: CLINIC | Age: 60
End: 2020-03-24

## 2020-03-24 NOTE — TELEPHONE ENCOUNTER
Called patient to review lab and ultrasound results.  LFTs stable, AFP normal.  Ultrasound without concern for HCC.    He is doing well overall, no new issues.  Continues to have intermittent abdominal pain. Imaging still shows gallstones though reminded that he is high risk for cholecystectomy. Advised repeat EGD (which he also needs for variceal surveillance) at our last visit though this was not done. Given # for Endo scheduling though will likely need to wait until end April/early May given covid-19 restrictions.    Will plan for repeat labs (CBC, CMP, INR, AFP), ultrasound, and f/u visit in 6 months.

## 2020-06-09 ENCOUNTER — TELEPHONE (OUTPATIENT)
Dept: FAMILY MEDICINE | Facility: CLINIC | Age: 60
End: 2020-06-09

## 2020-06-16 ENCOUNTER — LAB VISIT (OUTPATIENT)
Dept: LAB | Facility: HOSPITAL | Age: 60
End: 2020-06-16
Attending: FAMILY MEDICINE
Payer: MEDICARE

## 2020-06-16 ENCOUNTER — OFFICE VISIT (OUTPATIENT)
Dept: FAMILY MEDICINE | Facility: CLINIC | Age: 60
End: 2020-06-16
Payer: MEDICARE

## 2020-06-16 VITALS
HEART RATE: 90 BPM | WEIGHT: 199.19 LBS | DIASTOLIC BLOOD PRESSURE: 78 MMHG | TEMPERATURE: 99 F | SYSTOLIC BLOOD PRESSURE: 133 MMHG | HEIGHT: 71 IN | BODY MASS INDEX: 27.89 KG/M2

## 2020-06-16 DIAGNOSIS — K74.60 CIRRHOSIS OF LIVER WITHOUT ASCITES, UNSPECIFIED HEPATIC CIRRHOSIS TYPE: ICD-10-CM

## 2020-06-16 DIAGNOSIS — E03.9 HYPOTHYROIDISM, UNSPECIFIED TYPE: ICD-10-CM

## 2020-06-16 DIAGNOSIS — F33.9 RECURRENT MAJOR DEPRESSIVE DISORDER, REMISSION STATUS UNSPECIFIED: ICD-10-CM

## 2020-06-16 DIAGNOSIS — F41.1 GENERALIZED ANXIETY DISORDER: ICD-10-CM

## 2020-06-16 DIAGNOSIS — I85.10 ESOPHAGEAL VARICES IN CIRRHOSIS: ICD-10-CM

## 2020-06-16 DIAGNOSIS — E21.0 PRIMARY HYPERPARATHYROIDISM: ICD-10-CM

## 2020-06-16 DIAGNOSIS — E03.9 HYPOTHYROIDISM, ACQUIRED: Primary | ICD-10-CM

## 2020-06-16 DIAGNOSIS — N40.1 BENIGN PROSTATIC HYPERPLASIA WITH LOWER URINARY TRACT SYMPTOMS, SYMPTOM DETAILS UNSPECIFIED: ICD-10-CM

## 2020-06-16 DIAGNOSIS — K74.60 ESOPHAGEAL VARICES IN CIRRHOSIS: ICD-10-CM

## 2020-06-16 PROBLEM — Z87.898 HISTORY OF SEIZURE: Status: ACTIVE | Noted: 2020-06-16

## 2020-06-16 PROCEDURE — 3078F DIAST BP <80 MM HG: CPT | Mod: CPTII,S$GLB,, | Performed by: FAMILY MEDICINE

## 2020-06-16 PROCEDURE — 99214 OFFICE O/P EST MOD 30 MIN: CPT | Mod: S$GLB,,, | Performed by: FAMILY MEDICINE

## 2020-06-16 PROCEDURE — 36415 COLL VENOUS BLD VENIPUNCTURE: CPT | Mod: PO

## 2020-06-16 PROCEDURE — 99214 PR OFFICE/OUTPT VISIT, EST, LEVL IV, 30-39 MIN: ICD-10-PCS | Mod: S$GLB,,, | Performed by: FAMILY MEDICINE

## 2020-06-16 PROCEDURE — 3078F PR MOST RECENT DIASTOLIC BLOOD PRESSURE < 80 MM HG: ICD-10-PCS | Mod: CPTII,S$GLB,, | Performed by: FAMILY MEDICINE

## 2020-06-16 PROCEDURE — 99999 PR PBB SHADOW E&M-EST. PATIENT-LVL III: ICD-10-PCS | Mod: PBBFAC,,, | Performed by: FAMILY MEDICINE

## 2020-06-16 PROCEDURE — 3008F PR BODY MASS INDEX (BMI) DOCUMENTED: ICD-10-PCS | Mod: CPTII,S$GLB,, | Performed by: FAMILY MEDICINE

## 2020-06-16 PROCEDURE — 3008F BODY MASS INDEX DOCD: CPT | Mod: CPTII,S$GLB,, | Performed by: FAMILY MEDICINE

## 2020-06-16 PROCEDURE — 3075F SYST BP GE 130 - 139MM HG: CPT | Mod: CPTII,S$GLB,, | Performed by: FAMILY MEDICINE

## 2020-06-16 PROCEDURE — 84443 ASSAY THYROID STIM HORMONE: CPT

## 2020-06-16 PROCEDURE — 3075F PR MOST RECENT SYSTOLIC BLOOD PRESS GE 130-139MM HG: ICD-10-PCS | Mod: CPTII,S$GLB,, | Performed by: FAMILY MEDICINE

## 2020-06-16 PROCEDURE — 99999 PR PBB SHADOW E&M-EST. PATIENT-LVL III: CPT | Mod: PBBFAC,,, | Performed by: FAMILY MEDICINE
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
## 2020-06-17 DIAGNOSIS — E03.9 HYPOTHYROIDISM, UNSPECIFIED TYPE: ICD-10-CM

## 2020-06-17 LAB — TSH SERPL DL<=0.005 MIU/L-ACNC: 2.31 UIU/ML (ref 0.4–4)

## 2020-06-17 NOTE — TELEPHONE ENCOUNTER
MD JOANN Tejada Staff             Thyroid test okay.  Recommend continue current dosing on Synthroid.  Please send 1 year prescription. Thanks,   Dr. Chaudhari

## 2020-06-18 RX ORDER — LEVOTHYROXINE SODIUM 100 UG/1
100 TABLET ORAL DAILY
Qty: 90 TABLET | Refills: 3 | Status: SHIPPED | OUTPATIENT
Start: 2020-06-18

## 2020-07-15 ENCOUNTER — PATIENT OUTREACH (OUTPATIENT)
Dept: ADMINISTRATIVE | Facility: OTHER | Age: 60
End: 2020-07-15

## 2020-07-15 NOTE — PROGRESS NOTES
Requested updates within Care Everywhere.  Patient's chart was reviewed for overdue ADIN topics.  Immunizations reconciled.    Orders placed:  Tasked appts:  Labs Linked:

## 2020-07-17 ENCOUNTER — OFFICE VISIT (OUTPATIENT)
Dept: UROLOGY | Facility: CLINIC | Age: 60
End: 2020-07-17
Payer: MEDICARE

## 2020-07-17 ENCOUNTER — LAB VISIT (OUTPATIENT)
Dept: LAB | Facility: HOSPITAL | Age: 60
End: 2020-07-17
Attending: STUDENT IN AN ORGANIZED HEALTH CARE EDUCATION/TRAINING PROGRAM
Payer: MEDICARE

## 2020-07-17 VITALS
SYSTOLIC BLOOD PRESSURE: 144 MMHG | HEART RATE: 94 BPM | WEIGHT: 199 LBS | DIASTOLIC BLOOD PRESSURE: 86 MMHG | TEMPERATURE: 99 F | BODY MASS INDEX: 27.86 KG/M2 | HEIGHT: 71 IN

## 2020-07-17 DIAGNOSIS — R39.15 URINARY URGENCY: ICD-10-CM

## 2020-07-17 DIAGNOSIS — N39.41 URGE INCONTINENCE: ICD-10-CM

## 2020-07-17 DIAGNOSIS — R35.0 URINE FREQUENCY: ICD-10-CM

## 2020-07-17 DIAGNOSIS — Z12.5 ENCOUNTER FOR PROSTATE CANCER SCREENING: ICD-10-CM

## 2020-07-17 DIAGNOSIS — R35.1 NOCTURIA: ICD-10-CM

## 2020-07-17 DIAGNOSIS — N52.9 ERECTILE DYSFUNCTION, UNSPECIFIED ERECTILE DYSFUNCTION TYPE: Primary | ICD-10-CM

## 2020-07-17 LAB — COMPLEXED PSA SERPL-MCNC: 2.5 NG/ML (ref 0–4)

## 2020-07-17 PROCEDURE — 84153 ASSAY OF PSA TOTAL: CPT

## 2020-07-17 PROCEDURE — 3008F PR BODY MASS INDEX (BMI) DOCUMENTED: ICD-10-PCS | Mod: CPTII,S$GLB,, | Performed by: STUDENT IN AN ORGANIZED HEALTH CARE EDUCATION/TRAINING PROGRAM

## 2020-07-17 PROCEDURE — 99999 PR PBB SHADOW E&M-EST. PATIENT-LVL IV: ICD-10-PCS | Mod: PBBFAC,,, | Performed by: STUDENT IN AN ORGANIZED HEALTH CARE EDUCATION/TRAINING PROGRAM

## 2020-07-17 PROCEDURE — 36415 COLL VENOUS BLD VENIPUNCTURE: CPT

## 2020-07-17 PROCEDURE — 3079F DIAST BP 80-89 MM HG: CPT | Mod: CPTII,S$GLB,, | Performed by: STUDENT IN AN ORGANIZED HEALTH CARE EDUCATION/TRAINING PROGRAM

## 2020-07-17 PROCEDURE — 3077F PR MOST RECENT SYSTOLIC BLOOD PRESSURE >= 140 MM HG: ICD-10-PCS | Mod: CPTII,S$GLB,, | Performed by: STUDENT IN AN ORGANIZED HEALTH CARE EDUCATION/TRAINING PROGRAM

## 2020-07-17 PROCEDURE — 3077F SYST BP >= 140 MM HG: CPT | Mod: CPTII,S$GLB,, | Performed by: STUDENT IN AN ORGANIZED HEALTH CARE EDUCATION/TRAINING PROGRAM

## 2020-07-17 PROCEDURE — 3079F PR MOST RECENT DIASTOLIC BLOOD PRESSURE 80-89 MM HG: ICD-10-PCS | Mod: CPTII,S$GLB,, | Performed by: STUDENT IN AN ORGANIZED HEALTH CARE EDUCATION/TRAINING PROGRAM

## 2020-07-17 PROCEDURE — 99214 OFFICE O/P EST MOD 30 MIN: CPT | Mod: S$GLB,,, | Performed by: STUDENT IN AN ORGANIZED HEALTH CARE EDUCATION/TRAINING PROGRAM

## 2020-07-17 PROCEDURE — 3008F BODY MASS INDEX DOCD: CPT | Mod: CPTII,S$GLB,, | Performed by: STUDENT IN AN ORGANIZED HEALTH CARE EDUCATION/TRAINING PROGRAM

## 2020-07-17 PROCEDURE — 99999 PR PBB SHADOW E&M-EST. PATIENT-LVL IV: CPT | Mod: PBBFAC,,, | Performed by: STUDENT IN AN ORGANIZED HEALTH CARE EDUCATION/TRAINING PROGRAM

## 2020-07-17 PROCEDURE — 99214 PR OFFICE/OUTPT VISIT, EST, LEVL IV, 30-39 MIN: ICD-10-PCS | Mod: S$GLB,,, | Performed by: STUDENT IN AN ORGANIZED HEALTH CARE EDUCATION/TRAINING PROGRAM

## 2020-07-17 RX ORDER — HYDROCODONE BITARTRATE AND ACETAMINOPHEN 7.5; 325 MG/1; MG/1
1 TABLET ORAL EVERY 6 HOURS PRN
COMMUNITY
End: 2020-07-28

## 2020-07-17 RX ORDER — TADALAFIL 20 MG/1
20 TABLET ORAL DAILY PRN
Qty: 15 TABLET | Refills: 11 | Status: SHIPPED | OUTPATIENT
Start: 2020-07-17 | End: 2020-07-21
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
## 2020-07-20 ENCOUNTER — TELEPHONE (OUTPATIENT)
Dept: UROLOGY | Facility: CLINIC | Age: 60
End: 2020-07-20

## 2020-07-20 ENCOUNTER — PES CALL (OUTPATIENT)
Dept: ADMINISTRATIVE | Facility: CLINIC | Age: 60
End: 2020-07-20

## 2020-07-20 DIAGNOSIS — N52.9 ERECTILE DYSFUNCTION, UNSPECIFIED ERECTILE DYSFUNCTION TYPE: Primary | ICD-10-CM

## 2020-07-20 DIAGNOSIS — N40.0 BENIGN PROSTATIC HYPERPLASIA WITHOUT LOWER URINARY TRACT SYMPTOMS: ICD-10-CM

## 2020-07-20 DIAGNOSIS — Z12.5 ENCOUNTER FOR PROSTATE CANCER SCREENING: Primary | ICD-10-CM

## 2020-07-20 NOTE — TELEPHONE ENCOUNTER
Returned call, spoke with Ms Deisy, Mr Gerardo was not available.  She informed me I could reach him at .  Spoke with patient, he would like an alternative to cialis due to cost of $800.00, he is ok trying generic viagra. Send to [facility], he will be in [facility] on Thursday.   Thanks

## 2020-07-20 NOTE — TELEPHONE ENCOUNTER
----- Message from Deya Marshall sent at 7/20/2020  3:05 PM CDT -----  Contact: Friend Polly [phone]  Patient's friend is requesting to speak with nurse regarding a more affordable medication. Please advise.

## 2020-07-20 NOTE — TELEPHONE ENCOUNTER
Spoke with harman and notified her that pt PSA results was stable. And have him call office back if he has any questions

## 2020-07-20 NOTE — TELEPHONE ENCOUNTER
----- Message from Isaac Gaytan sent at 7/20/2020  3:37 PM CDT -----  Regarding: Returning Call  Contact: Partner, Caroline/ [phone]  Patient called in returning your call. Please advise.

## 2020-07-21 RX ORDER — SILDENAFIL CITRATE 20 MG/1
TABLET ORAL
Qty: 50 TABLET | Refills: 11 | Status: SHIPPED | OUTPATIENT
Start: 2020-07-21

## 2020-07-28 ENCOUNTER — OFFICE VISIT (OUTPATIENT)
Dept: FAMILY MEDICINE | Facility: CLINIC | Age: 60
End: 2020-07-28
Payer: MEDICARE

## 2020-07-28 VITALS
TEMPERATURE: 99 F | WEIGHT: 196.88 LBS | BODY MASS INDEX: 27.46 KG/M2 | OXYGEN SATURATION: 96 % | SYSTOLIC BLOOD PRESSURE: 152 MMHG | HEART RATE: 88 BPM | DIASTOLIC BLOOD PRESSURE: 78 MMHG

## 2020-07-28 DIAGNOSIS — E66.3 OVERWEIGHT (BMI 25.0-29.9): ICD-10-CM

## 2020-07-28 DIAGNOSIS — I10 HTN (HYPERTENSION), BENIGN: ICD-10-CM

## 2020-07-28 DIAGNOSIS — Z00.00 ENCOUNTER FOR PREVENTIVE HEALTH EXAMINATION: Primary | ICD-10-CM

## 2020-07-28 DIAGNOSIS — E03.9 HYPOTHYROIDISM, ACQUIRED: ICD-10-CM

## 2020-07-28 DIAGNOSIS — F41.1 GENERALIZED ANXIETY DISORDER: ICD-10-CM

## 2020-07-28 DIAGNOSIS — F33.9 EPISODE OF RECURRENT MAJOR DEPRESSIVE DISORDER, UNSPECIFIED DEPRESSION EPISODE SEVERITY: ICD-10-CM

## 2020-07-28 DIAGNOSIS — D69.6 THROMBOCYTOPENIA, UNSPECIFIED: ICD-10-CM

## 2020-07-28 DIAGNOSIS — R56.9 SEIZURE: ICD-10-CM

## 2020-07-28 DIAGNOSIS — E21.0 PRIMARY HYPERPARATHYROIDISM: ICD-10-CM

## 2020-07-28 PROCEDURE — 99999 PR PBB SHADOW E&M-EST. PATIENT-LVL IV: ICD-10-PCS | Mod: PBBFAC,,, | Performed by: NURSE PRACTITIONER

## 2020-07-28 PROCEDURE — 3078F PR MOST RECENT DIASTOLIC BLOOD PRESSURE < 80 MM HG: ICD-10-PCS | Mod: CPTII,S$GLB,, | Performed by: NURSE PRACTITIONER

## 2020-07-28 PROCEDURE — 3078F DIAST BP <80 MM HG: CPT | Mod: CPTII,S$GLB,, | Performed by: NURSE PRACTITIONER

## 2020-07-28 PROCEDURE — 99999 PR PBB SHADOW E&M-EST. PATIENT-LVL IV: CPT | Mod: PBBFAC,,, | Performed by: NURSE PRACTITIONER

## 2020-07-28 PROCEDURE — G0439 PR MEDICARE ANNUAL WELLNESS SUBSEQUENT VISIT: ICD-10-PCS | Mod: S$GLB,,, | Performed by: NURSE PRACTITIONER

## 2020-07-28 PROCEDURE — G0439 PPPS, SUBSEQ VISIT: HCPCS | Mod: S$GLB,,, | Performed by: NURSE PRACTITIONER

## 2020-07-28 PROCEDURE — 3077F PR MOST RECENT SYSTOLIC BLOOD PRESSURE >= 140 MM HG: ICD-10-PCS | Mod: CPTII,S$GLB,, | Performed by: NURSE PRACTITIONER

## 2020-07-28 PROCEDURE — 3077F SYST BP >= 140 MM HG: CPT | Mod: CPTII,S$GLB,, | Performed by: NURSE PRACTITIONER

## 2020-07-28 RX ORDER — GABAPENTIN 300 MG/1
600 CAPSULE ORAL NIGHTLY
Qty: 180 CAPSULE | Refills: 1 | Status: SHIPPED | OUTPATIENT
Start: 2020-07-28

## 2020-07-28 NOTE — PROGRESS NOTES
Lucio Mendoza presented for a  Medicare AWV and comprehensive Health Risk Assessment today. The following components were reviewed and updated:    · Medical history  · Family History  · Social history  · Allergies and Current Medications  · Health Risk Assessment  · Health Maintenance  · Care Team     ** See Completed Assessments for Annual Wellness Visit within the encounter summary.**         The following assessments were completed:  · Living Situation  · CAGE  · Depression Screening  · Timed Get Up and Go  · Whisper Test  · Cognitive Function Screening  · Nutrition Screening  · ADL Screening  · PAQ Screening        Vitals:    07/28/20 1359   BP: (!) 152/78   BP Location: Right arm   Patient Position: Sitting   BP Method: Large (Manual)   Pulse: 88   Temp: 98.7 °F (37.1 °C)   TempSrc: Temporal   SpO2: 96%   Weight: 89.3 kg (196 lb 13.9 oz)     Body mass index is 27.46 kg/m².     Physical Exam  Vitals signs and nursing note reviewed.   Constitutional:       General: He is not in acute distress.     Appearance: He is well-developed.   HENT:      Head: Normocephalic and atraumatic.   Eyes:      Pupils: Pupils are equal, round, and reactive to light.   Neck:      Musculoskeletal: Neck supple.      Vascular: No JVD.      Trachea: No tracheal deviation.   Cardiovascular:      Rate and Rhythm: Normal rate and regular rhythm.      Heart sounds: Normal heart sounds. No murmur.   Pulmonary:      Effort: Pulmonary effort is normal. No respiratory distress.      Breath sounds: Normal breath sounds. No wheezing or rales.   Abdominal:      General: Bowel sounds are normal. There is no distension.      Palpations: Abdomen is soft. There is no mass.      Tenderness: There is no abdominal tenderness.   Musculoskeletal: Normal range of motion.         General: No tenderness.   Skin:     General: Skin is warm and dry.      Coloration: Skin is not pale.      Findings: No erythema.   Neurological:      Mental Status: He is alert  and oriented to person, place, and time.      Coordination: Coordination normal.   Psychiatric:         Behavior: Behavior normal.         Thought Content: Thought content normal. Thought content does not include homicidal or suicidal ideation.         Judgment: Judgment normal.           Diagnoses and health risks identified today and associated recommendations/orders:    1. Encounter for preventive health examination    2. HTN (hypertension), benign  Chronic; stable.  Followed by PCP.    3. Hypothyroidism, acquired  Chronic; stable on medication.  Followed by Endocrinology.    4. Primary hyperparathyroidism  Chronic; stable.  Followed by Endocrinology.    5. Seizure  Chronic; stable.  Followed by PCP.    6. Thrombocytopenia, unspecified  Chronic; stable.  Followed by PCP.    7. Episode of recurrent major depressive disorder, unspecified depression episode severity  Chronic; stable on medication.  Followed by Psychiatry.    8. Generalized anxiety disorder  Chronic; stable on medication.  Followed by Psychiatry.    9. Overweight (BMI 25.0-29.9)  Chronic, stable. Therapeutic lifestyle changes discussed. Followed by PCP.      Provided Lucio with a 5-10 year written screening schedule and personal prevention plan. Recommendations were developed using the USPSTF age appropriate recommendations. Education, counseling, and referrals were provided as needed. After Visit Summary printed and given to patient which includes a list of additional screenings\tests needed.    Follow up for Annual Wellness Visit in 1 year.    Miriam Sinha NP         I offered to discuss end of life issues, including information on how to make advance directives that the patient could use to name someone who would make medical decisions on their behalf if they became too ill to make themselves.    ___Patient declined  _X_Patient is interested, I provided paper work and offered to discuss.

## 2020-07-28 NOTE — PATIENT INSTRUCTIONS
Counseling and Referral of Other Preventative  (Italic type indicates deductible and co-insurance are waived)    Patient Name: Lucio Mendoaz  Today's Date: 7/28/2020    Health Maintenance       Date Due Completion Date    TETANUS VACCINE 03/10/1976 ---    Shingles Vaccine (1 of 2) 03/10/2008 ---    Influenza Vaccine (1) 09/01/2020 9/3/2019    Lipid Panel 10/13/2020 10/13/2015    Colorectal Cancer Screening 11/29/2021 11/29/2018    Override on 2/4/2014: Done        No orders of the defined types were placed in this encounter.    The following information is provided to all patients.  This information is to help you find resources for any of the problems found today that may be affecting your health:                Living healthy guide: www.[facility].[place].gov      Understanding Diabetes: www.diabetes.org      Eating healthy: www.cdc.gov/healthyweight      [facility] home safety checklist: www.cdc.gov/steadi/patient.html      Agency on Aging: www.goea.[place].[facility]      Alcoholics anonymous (AA): www.aa.org      Physical Activity: www.amanda.nih.gov/wr9jxbk      Tobacco use: www.quitwithusla.org

## 2020-07-29 PROBLEM — E66.3 OVERWEIGHT (BMI 25.0-29.9): Status: ACTIVE | Noted: 2020-07-29

## 2020-07-29 PROBLEM — D69.6 THROMBOCYTOPENIA, UNSPECIFIED: Status: ACTIVE | Noted: 2020-07-29

## 2020-07-29 PROBLEM — R56.9 SEIZURE: Status: ACTIVE | Noted: 2020-07-29

## 2020-07-29 PROBLEM — I47.10 SVT (SUPRAVENTRICULAR TACHYCARDIA): Status: RESOLVED | Noted: 2018-09-06 | Resolved: 2020-07-29

## 2020-08-13 ENCOUNTER — OFFICE VISIT (OUTPATIENT)
Dept: FAMILY MEDICINE | Facility: CLINIC | Age: 60
End: 2020-08-13
Payer: MEDICARE

## 2020-08-13 VITALS
HEIGHT: 71 IN | DIASTOLIC BLOOD PRESSURE: 82 MMHG | TEMPERATURE: 98 F | SYSTOLIC BLOOD PRESSURE: 127 MMHG | BODY MASS INDEX: 27.91 KG/M2 | HEART RATE: 85 BPM | WEIGHT: 199.38 LBS

## 2020-08-13 DIAGNOSIS — L72.3 INFECTED SEBACEOUS CYST OF SKIN: Primary | ICD-10-CM

## 2020-08-13 DIAGNOSIS — L08.9 INFECTED SEBACEOUS CYST OF SKIN: Primary | ICD-10-CM

## 2020-08-13 PROCEDURE — 3074F PR MOST RECENT SYSTOLIC BLOOD PRESSURE < 130 MM HG: ICD-10-PCS | Mod: CPTII,S$GLB,, | Performed by: FAMILY MEDICINE

## 2020-08-13 PROCEDURE — 99999 PR PBB SHADOW E&M-EST. PATIENT-LVL III: CPT | Mod: PBBFAC,,, | Performed by: FAMILY MEDICINE

## 2020-08-13 PROCEDURE — 3079F PR MOST RECENT DIASTOLIC BLOOD PRESSURE 80-89 MM HG: ICD-10-PCS | Mod: CPTII,S$GLB,, | Performed by: FAMILY MEDICINE

## 2020-08-13 PROCEDURE — 99999 PR PBB SHADOW E&M-EST. PATIENT-LVL III: ICD-10-PCS | Mod: PBBFAC,,, | Performed by: FAMILY MEDICINE

## 2020-08-13 PROCEDURE — 3074F SYST BP LT 130 MM HG: CPT | Mod: CPTII,S$GLB,, | Performed by: FAMILY MEDICINE

## 2020-08-13 PROCEDURE — 87077 CULTURE AEROBIC IDENTIFY: CPT

## 2020-08-13 PROCEDURE — 10060 I&D ABSCESS SIMPLE/SINGLE: CPT | Mod: S$GLB,,, | Performed by: FAMILY MEDICINE

## 2020-08-13 PROCEDURE — 3008F PR BODY MASS INDEX (BMI) DOCUMENTED: ICD-10-PCS | Mod: CPTII,S$GLB,, | Performed by: FAMILY MEDICINE

## 2020-08-13 PROCEDURE — 99213 PR OFFICE/OUTPT VISIT, EST, LEVL III, 20-29 MIN: ICD-10-PCS | Mod: 25,S$GLB,, | Performed by: FAMILY MEDICINE

## 2020-08-13 PROCEDURE — 87070 CULTURE OTHR SPECIMN AEROBIC: CPT

## 2020-08-13 PROCEDURE — 87186 SC STD MICRODIL/AGAR DIL: CPT

## 2020-08-13 PROCEDURE — 3079F DIAST BP 80-89 MM HG: CPT | Mod: CPTII,S$GLB,, | Performed by: FAMILY MEDICINE

## 2020-08-13 PROCEDURE — 99213 OFFICE O/P EST LOW 20 MIN: CPT | Mod: 25,S$GLB,, | Performed by: FAMILY MEDICINE

## 2020-08-13 PROCEDURE — 3008F BODY MASS INDEX DOCD: CPT | Mod: CPTII,S$GLB,, | Performed by: FAMILY MEDICINE

## 2020-08-13 PROCEDURE — 10060 PR DRAIN SKIN ABSCESS SIMPLE: ICD-10-PCS | Mod: S$GLB,,, | Performed by: FAMILY MEDICINE

## 2020-08-13 RX ORDER — DOXYCYCLINE 100 MG/1
100 CAPSULE ORAL 2 TIMES DAILY
Qty: 20 CAPSULE | Refills: 0 | Status: SHIPPED | OUTPATIENT
Start: 2020-08-13 | End: 2020-08-23

## 2020-08-13 RX ORDER — DULOXETIN HYDROCHLORIDE 60 MG/1
CAPSULE, DELAYED RELEASE ORAL
COMMUNITY
Start: 2020-08-03

## 2020-08-13 RX ORDER — QUETIAPINE FUMARATE 100 MG/1
TABLET, FILM COATED ORAL
COMMUNITY
Start: 2020-07-30
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
## 2020-08-14 ENCOUNTER — TELEPHONE (OUTPATIENT)
Dept: FAMILY MEDICINE | Facility: CLINIC | Age: 60
End: 2020-08-14

## 2020-08-14 ENCOUNTER — OFFICE VISIT (OUTPATIENT)
Dept: FAMILY MEDICINE | Facility: CLINIC | Age: 60
End: 2020-08-14
Payer: MEDICARE

## 2020-08-14 VITALS
HEIGHT: 71 IN | HEART RATE: 92 BPM | DIASTOLIC BLOOD PRESSURE: 73 MMHG | TEMPERATURE: 97 F | BODY MASS INDEX: 28 KG/M2 | WEIGHT: 200 LBS | SYSTOLIC BLOOD PRESSURE: 125 MMHG

## 2020-08-14 DIAGNOSIS — L72.3 INFECTED SEBACEOUS CYST OF SKIN: Primary | ICD-10-CM

## 2020-08-14 DIAGNOSIS — L08.9 INFECTED SEBACEOUS CYST OF SKIN: Primary | ICD-10-CM

## 2020-08-14 PROCEDURE — 99999 PR PBB SHADOW E&M-EST. PATIENT-LVL III: CPT | Mod: PBBFAC,,, | Performed by: FAMILY MEDICINE

## 2020-08-14 PROCEDURE — 99999 PR PBB SHADOW E&M-EST. PATIENT-LVL III: ICD-10-PCS | Mod: PBBFAC,,, | Performed by: FAMILY MEDICINE

## 2020-08-14 PROCEDURE — 99024 POSTOP FOLLOW-UP VISIT: CPT | Mod: S$GLB,,, | Performed by: FAMILY MEDICINE

## 2020-08-14 PROCEDURE — 99024 PR POST-OP FOLLOW-UP VISIT: ICD-10-PCS | Mod: S$GLB,,, | Performed by: FAMILY MEDICINE

## 2020-08-14 RX ORDER — HYDROCODONE BITARTRATE AND ACETAMINOPHEN 5; 325 MG/1; MG/1
1 TABLET ORAL EVERY 6 HOURS PRN
Qty: 10 TABLET | Refills: 0 | Status: SHIPPED | OUTPATIENT
Start: 2020-08-14 | End: 2020-08-17

## 2020-08-14 NOTE — TELEPHONE ENCOUNTER
----- Message from Cristine Reyes sent at 8/14/2020  9:17 AM CDT -----  Regarding: Neville  Contact: edwin Connor states that patient is on Pain mgmt and needs to clear prescription thru that office, and also needs DX as to why patient needs this, please call her back at [phone]

## 2020-08-14 NOTE — TELEPHONE ENCOUNTER
Pharmacist given diagnosis codes and procedure information.  Pain mgmt is Dr Lee Jovel, they will contact him
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
## 2020-08-15 ENCOUNTER — OFFICE VISIT (OUTPATIENT)
Dept: FAMILY MEDICINE | Facility: CLINIC | Age: 60
End: 2020-08-15
Payer: MEDICARE

## 2020-08-15 VITALS
TEMPERATURE: 99 F | SYSTOLIC BLOOD PRESSURE: 146 MMHG | HEART RATE: 91 BPM | DIASTOLIC BLOOD PRESSURE: 84 MMHG | BODY MASS INDEX: 28.23 KG/M2 | WEIGHT: 201.63 LBS | HEIGHT: 71 IN

## 2020-08-15 DIAGNOSIS — L72.3 INFECTED SEBACEOUS CYST: Primary | ICD-10-CM

## 2020-08-15 DIAGNOSIS — L08.9 INFECTED SEBACEOUS CYST: Primary | ICD-10-CM

## 2020-08-15 PROCEDURE — 3008F BODY MASS INDEX DOCD: CPT | Mod: CPTII,S$GLB,, | Performed by: NURSE PRACTITIONER

## 2020-08-15 PROCEDURE — 99999 PR PBB SHADOW E&M-EST. PATIENT-LVL IV: ICD-10-PCS | Mod: PBBFAC,,, | Performed by: NURSE PRACTITIONER

## 2020-08-15 PROCEDURE — 99024 PR POST-OP FOLLOW-UP VISIT: ICD-10-PCS | Mod: S$GLB,,, | Performed by: NURSE PRACTITIONER

## 2020-08-15 PROCEDURE — 3008F PR BODY MASS INDEX (BMI) DOCUMENTED: ICD-10-PCS | Mod: CPTII,S$GLB,, | Performed by: NURSE PRACTITIONER

## 2020-08-15 PROCEDURE — 3079F DIAST BP 80-89 MM HG: CPT | Mod: CPTII,S$GLB,, | Performed by: NURSE PRACTITIONER

## 2020-08-15 PROCEDURE — 3077F SYST BP >= 140 MM HG: CPT | Mod: CPTII,S$GLB,, | Performed by: NURSE PRACTITIONER

## 2020-08-15 PROCEDURE — 3079F PR MOST RECENT DIASTOLIC BLOOD PRESSURE 80-89 MM HG: ICD-10-PCS | Mod: CPTII,S$GLB,, | Performed by: NURSE PRACTITIONER

## 2020-08-15 PROCEDURE — 99999 PR PBB SHADOW E&M-EST. PATIENT-LVL IV: CPT | Mod: PBBFAC,,, | Performed by: NURSE PRACTITIONER

## 2020-08-15 PROCEDURE — 99024 POSTOP FOLLOW-UP VISIT: CPT | Mod: S$GLB,,, | Performed by: NURSE PRACTITIONER

## 2020-08-15 PROCEDURE — 3077F PR MOST RECENT SYSTOLIC BLOOD PRESSURE >= 140 MM HG: ICD-10-PCS | Mod: CPTII,S$GLB,, | Performed by: NURSE PRACTITIONER

## 2020-08-17 ENCOUNTER — OFFICE VISIT (OUTPATIENT)
Dept: FAMILY MEDICINE | Facility: CLINIC | Age: 60
End: 2020-08-17
Payer: MEDICARE

## 2020-08-17 VITALS
HEART RATE: 86 BPM | DIASTOLIC BLOOD PRESSURE: 86 MMHG | HEIGHT: 71 IN | BODY MASS INDEX: 28.14 KG/M2 | WEIGHT: 201 LBS | SYSTOLIC BLOOD PRESSURE: 126 MMHG | TEMPERATURE: 98 F

## 2020-08-17 DIAGNOSIS — L08.9 INFECTED SEBACEOUS CYST OF SKIN: Primary | ICD-10-CM

## 2020-08-17 DIAGNOSIS — L72.3 INFECTED SEBACEOUS CYST OF SKIN: Primary | ICD-10-CM

## 2020-08-17 LAB — BACTERIA SPEC AEROBE CULT: ABNORMAL

## 2020-08-17 PROCEDURE — 3079F DIAST BP 80-89 MM HG: CPT | Mod: CPTII,S$GLB,, | Performed by: NURSE PRACTITIONER

## 2020-08-17 PROCEDURE — 99999 PR PBB SHADOW E&M-EST. PATIENT-LVL III: CPT | Mod: PBBFAC,,, | Performed by: NURSE PRACTITIONER

## 2020-08-17 PROCEDURE — 99999 PR PBB SHADOW E&M-EST. PATIENT-LVL III: ICD-10-PCS | Mod: PBBFAC,,, | Performed by: NURSE PRACTITIONER

## 2020-08-17 PROCEDURE — 99024 POSTOP FOLLOW-UP VISIT: CPT | Mod: S$GLB,,, | Performed by: NURSE PRACTITIONER

## 2020-08-17 PROCEDURE — 3074F PR MOST RECENT SYSTOLIC BLOOD PRESSURE < 130 MM HG: ICD-10-PCS | Mod: CPTII,S$GLB,, | Performed by: NURSE PRACTITIONER

## 2020-08-17 PROCEDURE — 3079F PR MOST RECENT DIASTOLIC BLOOD PRESSURE 80-89 MM HG: ICD-10-PCS | Mod: CPTII,S$GLB,, | Performed by: NURSE PRACTITIONER

## 2020-08-17 PROCEDURE — 3008F BODY MASS INDEX DOCD: CPT | Mod: CPTII,S$GLB,, | Performed by: NURSE PRACTITIONER

## 2020-08-17 PROCEDURE — 3074F SYST BP LT 130 MM HG: CPT | Mod: CPTII,S$GLB,, | Performed by: NURSE PRACTITIONER

## 2020-08-17 PROCEDURE — 3008F PR BODY MASS INDEX (BMI) DOCUMENTED: ICD-10-PCS | Mod: CPTII,S$GLB,, | Performed by: NURSE PRACTITIONER

## 2020-08-17 PROCEDURE — 99024 PR POST-OP FOLLOW-UP VISIT: ICD-10-PCS | Mod: S$GLB,,, | Performed by: NURSE PRACTITIONER

## 2020-08-17 RX ORDER — HYDROCODONE BITARTRATE AND ACETAMINOPHEN 7.5; 325 MG/1; MG/1
TABLET ORAL
COMMUNITY
Start: 2020-08-14

## 2020-08-17 NOTE — PROGRESS NOTES
Subjective:       Patient ID: Lucio Mendoza is a 60 y.o. male.    Chief Complaint: Dressing Change    HPI     He comes into the office for follow-up of I&D on 8/13/2020, repacked 8/15/2020.  He had a cyst on his back for many years, it became swollen and tender and his girlfriend tried to poke it with a needle and squeeze it.  It became worse after that. Pain, drainage, and erythema to the area have improved.  He needs to have packing removed today.  Taking doxycycline twice daily.  He denies fever.  He is unable to change his dressing on his own.    Review of Systems   Constitutional: Negative for fatigue, fever and unexpected weight change.   HENT: Negative for ear pain and sore throat.    Eyes: Negative.  Negative for pain and visual disturbance.   Respiratory: Negative for cough and shortness of breath.    Cardiovascular: Negative for chest pain and palpitations.   Gastrointestinal: Negative for abdominal pain, diarrhea, nausea and vomiting.   Genitourinary: Negative for dysuria and frequency.   Musculoskeletal: Negative for arthralgias and myalgias.   Skin: Positive for wound. Negative for color change and rash.   Neurological: Negative for dizziness and headaches.   Psychiatric/Behavioral: Negative for sleep disturbance. The patient is not nervous/anxious.            Objective:     Current Outpatient Medications   Medication Sig Dispense Refill    cholecalciferol, vitamin D3, (VITAMIN D3) 2,000 unit Cap Take 1 capsule (2,000 Units total) by mouth once daily. 90 capsule 1    DULoxetine (CYMBALTA) 60 MG capsule Take one (1) capsule by mouth every morning      gabapentin (NEURONTIN) 300 MG capsule Take 2 capsules (600 mg total) by mouth every evening. 180 capsule 1    ibuprofen (ADVIL,MOTRIN) 800 MG tablet take 1 tablet by oral route every day with food  1    lactulose (CHRONULAC) 20 gram/30 mL Soln Take 30 mLs (20 g total) by mouth once daily. 1800 mL 5    levothyroxine (SYNTHROID) 100 MCG tablet Take 1  tablet (100 mcg total) by mouth once daily. 90 tablet 3    multivitamin with minerals tablet Take 1 tablet by mouth once daily.      paroxetine (PAXIL) 40 MG tablet Take 40 mg by mouth.      QUEtiapine (SEROQUEL) 100 MG Tab Take one (1) tablet by mouth at bedtime      sildenafil (REVATIO) 20 mg Tab Take 3, 4, or 5 tablets at a time (as directed by provider) 1 hour before sexual activity 50 tablet 11    triamcinolone acetonide 0.1% (KENALOG) 0.1 % cream APPLY TO AFFECTED AREA TWICE DAILY 80 g 1    HYDROcodone-acetaminophen (NORCO) 7.5-325 mg per tablet TAKE ONE TABLET BY MOUTH EVERY 24 HOURS AS NEEDED FOR PAIN (Medically necessary for greater than 7 days on rx ) do not fill before 07/29/202       No current facility-administered medications for this visit.        Physical Exam  Vitals signs and nursing note reviewed.   Constitutional:       General: He is not in acute distress.     Appearance: He is well-developed.   HENT:      Head: Normocephalic and atraumatic.   Eyes:      Pupils: Pupils are equal, round, and reactive to light.   Neck:      Musculoskeletal: Normal range of motion and neck supple.   Cardiovascular:      Rate and Rhythm: Normal rate and regular rhythm.   Pulmonary:      Effort: Pulmonary effort is normal.      Breath sounds: Normal breath sounds.   Musculoskeletal: Normal range of motion.   Skin:     General: Skin is warm and dry.      Findings: No rash.          Neurological:      Mental Status: He is alert and oriented to person, place, and time.   Psychiatric:         Thought Content: Thought content normal.       Packing removed from I&D, significant improvement. Area does not need to be repacked    Assessment:       1. Infected sebaceous cyst of skin        Plan:   Infected sebaceous cyst of skin    follow up in 2 days for dressing change on nurse schedule  Complete antibiotics    No follow-ups on file.    There are no Patient Instructions on file for this visit.

## 2020-08-25 NOTE — PROGRESS NOTES
Subjective:       Patient ID: Lucio Mendoza is a 60 y.o. male.    Chief Complaint: Cyst    HPI     He comes into the office for follow-up of I&D on 8/13/2020, repacked 8/14/2020.  He had a cyst on his back for many years, it became swollen and tender and his girlfriend tried to poke it with a needle and squeeze it.  It became worse after that.  He has continued to have pain, drainage, and erythema to the area.  He needs to have packing removed today.  Taking doxycycline twice daily.  He denies fever.  He is unable to change his dressing on his own.      Review of Systems   Constitutional: Negative for fatigue, fever and unexpected weight change.   HENT: Negative for ear pain and sore throat.    Eyes: Negative.  Negative for pain and visual disturbance.   Respiratory: Negative for cough and shortness of breath.    Cardiovascular: Negative for chest pain and palpitations.   Gastrointestinal: Negative for abdominal pain, diarrhea, nausea and vomiting.   Genitourinary: Negative for dysuria and frequency.   Musculoskeletal: Negative for arthralgias and myalgias.   Skin: Positive for wound. Negative for color change and rash.   Neurological: Negative for dizziness and headaches.   Psychiatric/Behavioral: Negative for sleep disturbance. The patient is not nervous/anxious.        Vitals:    08/15/20 0859   BP: (!) 146/84   Pulse: 91   Temp: 98.5 °F (36.9 °C)       Objective:     Current Outpatient Medications   Medication Sig Dispense Refill    cholecalciferol, vitamin D3, (VITAMIN D3) 2,000 unit Cap Take 1 capsule (2,000 Units total) by mouth once daily. 90 capsule 1    DULoxetine (CYMBALTA) 60 MG capsule Take one (1) capsule by mouth every morning      gabapentin (NEURONTIN) 300 MG capsule Take 2 capsules (600 mg total) by mouth every evening. 180 capsule 1    ibuprofen (ADVIL,MOTRIN) 800 MG tablet take 1 tablet by oral route every day with food  1    lactulose (CHRONULAC) 20 gram/30 mL Soln Take 30 mLs (20 g  total) by mouth once daily. 1800 mL 5    levothyroxine (SYNTHROID) 100 MCG tablet Take 1 tablet (100 mcg total) by mouth once daily. 90 tablet 3    multivitamin with minerals tablet Take 1 tablet by mouth once daily.      paroxetine (PAXIL) 40 MG tablet Take 40 mg by mouth.      QUEtiapine (SEROQUEL) 100 MG Tab Take one (1) tablet by mouth at bedtime      sildenafil (REVATIO) 20 mg Tab Take 3, 4, or 5 tablets at a time (as directed by provider) 1 hour before sexual activity 50 tablet 11    triamcinolone acetonide 0.1% (KENALOG) 0.1 % cream APPLY TO AFFECTED AREA TWICE DAILY 80 g 1    HYDROcodone-acetaminophen (NORCO) 7.5-325 mg per tablet TAKE ONE TABLET BY MOUTH EVERY 24 HOURS AS NEEDED FOR PAIN (Medically necessary for greater than 7 days on rx ) do not fill before 07/29/202       No current facility-administered medications for this visit.        Physical Exam  Vitals signs and nursing note reviewed.   Constitutional:       General: He is not in acute distress.     Appearance: He is well-developed.   HENT:      Head: Normocephalic and atraumatic.   Eyes:      Pupils: Pupils are equal, round, and reactive to light.   Neck:      Musculoskeletal: Normal range of motion and neck supple.   Cardiovascular:      Rate and Rhythm: Normal rate and regular rhythm.   Pulmonary:      Effort: Pulmonary effort is normal.      Breath sounds: Normal breath sounds.   Musculoskeletal: Normal range of motion.   Skin:     General: Skin is warm and dry.      Findings: Abscess present.          Neurological:      Mental Status: He is alert and oriented to person, place, and time.   Psychiatric:         Thought Content: Thought content normal.         Packing was removed.  Due to pain the area was anesthetized with 1% lidocaine.  Quarter-inch iodoform gauze was used to repack the area.  It was covered with 4 x 4 gauze and tape.  Patient tolerated well.    Assessment:       1. Infected sebaceous cyst        Plan:   Infected  sebaceous cyst     Complete antibiotics, follow-up on Monday for packing removal    No follow-ups on file.    There are no Patient Instructions on file for this visit.

## 2020-09-24 ENCOUNTER — PATIENT OUTREACH (OUTPATIENT)
Dept: ADMINISTRATIVE | Facility: OTHER | Age: 60
End: 2020-09-24

## 2020-09-25 ENCOUNTER — HOSPITAL ENCOUNTER (OUTPATIENT)
Dept: RADIOLOGY | Facility: HOSPITAL | Age: 60
Discharge: HOME | End: 2020-09-25
Attending: NURSE PRACTITIONER
Payer: MEDICARE

## 2020-09-25 ENCOUNTER — OFFICE VISIT (OUTPATIENT)
Dept: HEPATOLOGY | Facility: CLINIC | Age: 60
End: 2020-09-25
Payer: MEDICARE

## 2020-09-25 VITALS
SYSTOLIC BLOOD PRESSURE: 123 MMHG | HEIGHT: 71 IN | OXYGEN SATURATION: 99 % | WEIGHT: 201.94 LBS | HEART RATE: 88 BPM | BODY MASS INDEX: 28.27 KG/M2 | DIASTOLIC BLOOD PRESSURE: 76 MMHG

## 2020-09-25 DIAGNOSIS — Z86.19 HISTORY OF HEPATITIS C: ICD-10-CM

## 2020-09-25 DIAGNOSIS — K74.60 ESOPHAGEAL VARICES IN CIRRHOSIS: ICD-10-CM

## 2020-09-25 DIAGNOSIS — K74.60 CIRRHOSIS OF LIVER WITHOUT ASCITES, UNSPECIFIED HEPATIC CIRRHOSIS TYPE: ICD-10-CM

## 2020-09-25 DIAGNOSIS — I85.10 ESOPHAGEAL VARICES IN CIRRHOSIS: ICD-10-CM

## 2020-09-25 DIAGNOSIS — K74.60 CIRRHOSIS OF LIVER WITHOUT ASCITES, UNSPECIFIED HEPATIC CIRRHOSIS TYPE: Primary | ICD-10-CM

## 2020-09-25 PROCEDURE — 76700 US EXAM ABDOM COMPLETE: CPT | Mod: 26,,, | Performed by: RADIOLOGY

## 2020-09-25 PROCEDURE — 3078F DIAST BP <80 MM HG: CPT | Mod: CPTII,S$GLB,, | Performed by: NURSE PRACTITIONER

## 2020-09-25 PROCEDURE — 76700 US ABDOMEN COMPLETE: ICD-10-PCS | Mod: 26,,, | Performed by: RADIOLOGY

## 2020-09-25 PROCEDURE — 99999 PR PBB SHADOW E&M-EST. PATIENT-LVL III: ICD-10-PCS | Mod: PBBFAC,,, | Performed by: NURSE PRACTITIONER

## 2020-09-25 PROCEDURE — 99999 PR PBB SHADOW E&M-EST. PATIENT-LVL III: CPT | Mod: PBBFAC,,, | Performed by: NURSE PRACTITIONER

## 2020-09-25 PROCEDURE — 3074F PR MOST RECENT SYSTOLIC BLOOD PRESSURE < 130 MM HG: ICD-10-PCS | Mod: CPTII,S$GLB,, | Performed by: NURSE PRACTITIONER

## 2020-09-25 PROCEDURE — 99214 OFFICE O/P EST MOD 30 MIN: CPT | Mod: S$GLB,,, | Performed by: NURSE PRACTITIONER

## 2020-09-25 PROCEDURE — 3078F PR MOST RECENT DIASTOLIC BLOOD PRESSURE < 80 MM HG: ICD-10-PCS | Mod: CPTII,S$GLB,, | Performed by: NURSE PRACTITIONER

## 2020-09-25 PROCEDURE — 99214 PR OFFICE/OUTPT VISIT, EST, LEVL IV, 30-39 MIN: ICD-10-PCS | Mod: S$GLB,,, | Performed by: NURSE PRACTITIONER

## 2020-09-25 PROCEDURE — 76700 US EXAM ABDOM COMPLETE: CPT | Mod: TC

## 2020-09-25 PROCEDURE — 3008F PR BODY MASS INDEX (BMI) DOCUMENTED: ICD-10-PCS | Mod: CPTII,S$GLB,, | Performed by: NURSE PRACTITIONER

## 2020-09-25 PROCEDURE — 3074F SYST BP LT 130 MM HG: CPT | Mod: CPTII,S$GLB,, | Performed by: NURSE PRACTITIONER

## 2020-09-25 PROCEDURE — 3008F BODY MASS INDEX DOCD: CPT | Mod: CPTII,S$GLB,, | Performed by: NURSE PRACTITIONER

## 2020-09-25 RX ORDER — LACTULOSE 10 G/15ML
20 SOLUTION ORAL DAILY
Qty: 1800 ML | Refills: 5 | Status: SHIPPED | OUTPATIENT
Start: 2020-09-25

## 2020-09-25 NOTE — PATIENT INSTRUCTIONS
1. Call to schedule endoscopy: [phone]    2. Labs show liver is working well    3. Continue lactulose as needed, refills sent to your pharmacy    4. Repeat liver testing and visit in 6 months        Signs and symptoms of worsening liver disease include jaundice (yellow skin/eyes), fluid in the abdomen (ascites), and confusion/disorientation/slowed thought processes due to hepatic encephalopathy (toxins building up when the liver is not working properly). You should seek medical attention if any of these things occur. Also, possible bleeding from esophageal varices (blood vessels in the stomach and foodpipe that can burst and cause bleeding). If you have symptoms of vomiting blood, blood in your stool, maroon or black stools, or coffee ground vomit, you should go to the emergency room.     Cirrhosis Counseling  -- Strict abstinence from alcohol (includes beer, wine, and/or liquor)  -- Avoid non-steroidal anti-inflammatory drugs (NSAIDs) such as ibuprofen (Motrin, Advil), naproxen (Naprosyn, Aleve)  -- Tylenol/acetaminophen as needed for pain, no more than 2000 mg per day  -- No raw shellfish due to the risk of Vibrio vulnificus infection  -- Low sodium diet, less than 2000 mg per day   -- High protein diet to prevent muscle mass loss. Can drink protein shakes (Premier Protein is a great option because it is very high protein and low sugar)  -- Periodic upper endoscopy (EGD) to screen for varices (enlarged blood vessels) in the stomach and esophagus which can burst and cause bleeding  -- Ultrasound of the liver every 6 months for liver cancer screening.
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
## 2020-10-07 ENCOUNTER — OFFICE VISIT (OUTPATIENT)
Dept: FAMILY MEDICINE | Facility: CLINIC | Age: 60
End: 2020-10-07
Payer: MEDICARE

## 2020-10-07 VITALS
DIASTOLIC BLOOD PRESSURE: 88 MMHG | TEMPERATURE: 97 F | HEIGHT: 71 IN | RESPIRATION RATE: 18 BRPM | WEIGHT: 203 LBS | HEART RATE: 83 BPM | BODY MASS INDEX: 28.42 KG/M2 | SYSTOLIC BLOOD PRESSURE: 138 MMHG

## 2020-10-07 DIAGNOSIS — Z87.820 HISTORY OF TRAUMATIC BRAIN INJURY: ICD-10-CM

## 2020-10-07 DIAGNOSIS — F33.9 EPISODE OF RECURRENT MAJOR DEPRESSIVE DISORDER, UNSPECIFIED DEPRESSION EPISODE SEVERITY: ICD-10-CM

## 2020-10-07 DIAGNOSIS — F41.9 ANXIETY: ICD-10-CM

## 2020-10-07 DIAGNOSIS — M54.9 CHRONIC BACK PAIN, UNSPECIFIED BACK LOCATION, UNSPECIFIED BACK PAIN LATERALITY: Primary | ICD-10-CM

## 2020-10-07 DIAGNOSIS — E03.9 HYPOTHYROIDISM, ACQUIRED: ICD-10-CM

## 2020-10-07 DIAGNOSIS — G89.29 CHRONIC BACK PAIN, UNSPECIFIED BACK LOCATION, UNSPECIFIED BACK PAIN LATERALITY: Primary | ICD-10-CM

## 2020-10-07 DIAGNOSIS — G47.00 INSOMNIA, UNSPECIFIED TYPE: ICD-10-CM

## 2020-10-07 PROCEDURE — 3008F PR BODY MASS INDEX (BMI) DOCUMENTED: ICD-10-PCS | Mod: CPTII,S$GLB,, | Performed by: FAMILY MEDICINE

## 2020-10-07 PROCEDURE — 99215 OFFICE O/P EST HI 40 MIN: CPT | Mod: S$GLB,,, | Performed by: FAMILY MEDICINE

## 2020-10-07 PROCEDURE — 3079F DIAST BP 80-89 MM HG: CPT | Mod: CPTII,S$GLB,, | Performed by: FAMILY MEDICINE

## 2020-10-07 PROCEDURE — 3075F PR MOST RECENT SYSTOLIC BLOOD PRESS GE 130-139MM HG: ICD-10-PCS | Mod: CPTII,S$GLB,, | Performed by: FAMILY MEDICINE

## 2020-10-07 PROCEDURE — 99215 PR OFFICE/OUTPT VISIT, EST, LEVL V, 40-54 MIN: ICD-10-PCS | Mod: S$GLB,,, | Performed by: FAMILY MEDICINE

## 2020-10-07 PROCEDURE — 99999 PR PBB SHADOW E&M-EST. PATIENT-LVL III: ICD-10-PCS | Mod: PBBFAC,,, | Performed by: FAMILY MEDICINE

## 2020-10-07 PROCEDURE — 3008F BODY MASS INDEX DOCD: CPT | Mod: CPTII,S$GLB,, | Performed by: FAMILY MEDICINE

## 2020-10-07 PROCEDURE — 3075F SYST BP GE 130 - 139MM HG: CPT | Mod: CPTII,S$GLB,, | Performed by: FAMILY MEDICINE

## 2020-10-07 PROCEDURE — 99999 PR PBB SHADOW E&M-EST. PATIENT-LVL III: CPT | Mod: PBBFAC,,, | Performed by: FAMILY MEDICINE

## 2020-10-07 PROCEDURE — 3079F PR MOST RECENT DIASTOLIC BLOOD PRESSURE 80-89 MM HG: ICD-10-PCS | Mod: CPTII,S$GLB,, | Performed by: FAMILY MEDICINE

## 2020-10-07 RX ORDER — ALPRAZOLAM 1 MG/1
1 TABLET ORAL NIGHTLY PRN
Qty: 10 TABLET | Refills: 0 | Status: SHIPPED | OUTPATIENT
Start: 2020-10-07 | End: 2020-10-17
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
## 2022-05-03 NOTE — TELEPHONE ENCOUNTER
05/30/17    1705  Contacted pt to remind him to stop taking Aspirin 81mg on tomorrow, 06/01/17. Surgery and preop date confirmed with pt. Pt verbalized understanding.   Anesthesia Post Evaluation    Patient: Charmaine Harrington    Procedure(s) Performed: Procedure(s) (LRB):  REPAIR-ANEURYSM-ABDOMINAL AORTIC-ENDOVASCULAR (AAA) (N/A)    Final Anesthesia Type: general      Patient location during evaluation: ICU  Patient participation: Yes- Able to Participate  Level of consciousness: awake and alert  Post-procedure vital signs: reviewed and stable  Pain management: adequate  Airway patency: patent    PONV status at discharge: No PONV  Anesthetic complications: no      Cardiovascular status: blood pressure returned to baseline  Respiratory status: unassisted, spontaneous ventilation and room air  Hydration status: euvolemic  Follow-up not needed.          Vitals Value Taken Time   /65 05/03/22 0902   Temp 37.1 °C (98.8 °F) 05/03/22 0700   Pulse 82 05/03/22 0918   Resp 17 05/03/22 0918   SpO2 94 % 05/03/22 0918   Vitals shown include unvalidated device data.      No case tracking events are documented in the log.      Pain/Lizzy Score: Pain Rating Prior to Med Admin: 8 (5/2/2022  3:34 PM)  Pain Rating Post Med Admin: 6 (5/2/2022  1:20 PM)
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
## (undated) DEVICE — SUPPORT ULNA NERVE PROTECTOR

## (undated) DEVICE — DRESSING N ADH OIL EMUL 3X3

## (undated) DEVICE — SUT 2-0 VICRYL / SH (J417)

## (undated) DEVICE — NDL HYPO REG 25G X 1 1/2

## (undated) DEVICE — SEE MEDLINE ITEM 157194

## (undated) DEVICE — SEE MEDLINE ITEM 156955

## (undated) DEVICE — GAUZE SPONGE PEANUT STRL

## (undated) DEVICE — SUT PROLENE 4-0 MONO 18IN

## (undated) DEVICE — SUT 3-0 VICRYL / SH (J416)

## (undated) DEVICE — SEE MEDLINE ITEM 157116

## (undated) DEVICE — DRESSING AQUACEL SACRAL 9 X 9

## (undated) DEVICE — ELECTRODE REM PLYHSV RETURN 9

## (undated) DEVICE — COVER OVERHEAD SURG LT BLUE

## (undated) DEVICE — PACK BASIC

## (undated) DEVICE — GAUZE SPONGE 4X4 12PLY

## (undated) DEVICE — BLADE PEAK PLASMA

## (undated) DEVICE — MANIFOLD 4 PORT

## (undated) DEVICE — APPLICATOR CHLORAPREP ORN 26ML

## (undated) DEVICE — GLOVE BIOGEL ECLIPSE SZ 6

## (undated) DEVICE — SEE MEDLINE ITEM 152622

## (undated) DEVICE — SEALER LIGASURE CRV 18.8CM

## (undated) DEVICE — UNDERGLOVE BIOGEL PI SZ 6.5 LF

## (undated) DEVICE — Device

## (undated) DEVICE — ELECTRODE NEEDLE 1IN

## (undated) DEVICE — DRAIN PENROSE XRAY 18 X 1/4 ST

## (undated) DEVICE — ADHESIVE DERMABOND ADVANCED

## (undated) DEVICE — SYR 10CC LUER LOCK

## (undated) DEVICE — SHEET THYROID W/ISO-BAC